# Patient Record
Sex: MALE | Race: WHITE | Employment: OTHER | URBAN - METROPOLITAN AREA
[De-identification: names, ages, dates, MRNs, and addresses within clinical notes are randomized per-mention and may not be internally consistent; named-entity substitution may affect disease eponyms.]

---

## 2017-01-25 ENCOUNTER — NURSE ONLY (OUTPATIENT)
Dept: CARDIOLOGY CLINIC | Age: 77
End: 2017-01-25

## 2017-01-25 DIAGNOSIS — Z95.810 CARDIAC DEFIBRILLATOR IN PLACE: ICD-10-CM

## 2017-01-25 DIAGNOSIS — Z95.810 AUTOMATIC IMPLANTABLE CARDIOVERTER-DEFIBRILLATOR IN SITU: Chronic | ICD-10-CM

## 2017-01-25 DIAGNOSIS — I47.20 VENTRICULAR TACHYCARDIA: Chronic | ICD-10-CM

## 2017-01-25 PROCEDURE — 93295 DEV INTERROG REMOTE 1/2/MLT: CPT | Performed by: INTERNAL MEDICINE

## 2017-01-25 PROCEDURE — 93296 REM INTERROG EVL PM/IDS: CPT | Performed by: INTERNAL MEDICINE

## 2017-05-03 ENCOUNTER — NURSE ONLY (OUTPATIENT)
Dept: CARDIOLOGY CLINIC | Age: 77
End: 2017-05-03

## 2017-05-03 DIAGNOSIS — Z95.810 AUTOMATIC IMPLANTABLE CARDIOVERTER-DEFIBRILLATOR IN SITU: Chronic | ICD-10-CM

## 2017-05-03 DIAGNOSIS — I47.20 VENTRICULAR TACHYCARDIA: Chronic | ICD-10-CM

## 2017-05-03 DIAGNOSIS — Z95.810 CARDIAC DEFIBRILLATOR IN PLACE: ICD-10-CM

## 2017-05-03 PROCEDURE — 93295 DEV INTERROG REMOTE 1/2/MLT: CPT | Performed by: INTERNAL MEDICINE

## 2017-05-03 PROCEDURE — 93296 REM INTERROG EVL PM/IDS: CPT | Performed by: INTERNAL MEDICINE

## 2017-06-02 ENCOUNTER — OFFICE VISIT (OUTPATIENT)
Dept: ORTHOPEDIC SURGERY | Age: 77
End: 2017-06-02

## 2017-06-02 VITALS — BODY MASS INDEX: 23.11 KG/M2 | WEIGHT: 156 LBS | HEIGHT: 69 IN

## 2017-06-02 DIAGNOSIS — M54.50 ACUTE RIGHT-SIDED LOW BACK PAIN WITHOUT SCIATICA: Primary | ICD-10-CM

## 2017-06-02 PROCEDURE — 99203 OFFICE O/P NEW LOW 30 MIN: CPT | Performed by: ORTHOPAEDIC SURGERY

## 2017-06-02 RX ORDER — METHYLPREDNISOLONE 4 MG/1
TABLET ORAL
Qty: 1 KIT | Refills: 0 | Status: SHIPPED | OUTPATIENT
Start: 2017-06-02 | End: 2017-10-31

## 2017-06-12 ENCOUNTER — OFFICE VISIT (OUTPATIENT)
Dept: ORTHOPEDIC SURGERY | Age: 77
End: 2017-06-12

## 2017-06-12 VITALS — HEIGHT: 69 IN | WEIGHT: 156.09 LBS | BODY MASS INDEX: 23.12 KG/M2

## 2017-06-12 DIAGNOSIS — M54.16 LUMBAR RADICULOPATHY: ICD-10-CM

## 2017-06-12 DIAGNOSIS — M54.5 LOW BACK PAIN, UNSPECIFIED BACK PAIN LATERALITY, UNSPECIFIED CHRONICITY, WITH SCIATICA PRESENCE UNSPECIFIED: Primary | ICD-10-CM

## 2017-06-12 PROCEDURE — 99213 OFFICE O/P EST LOW 20 MIN: CPT | Performed by: ORTHOPAEDIC SURGERY

## 2017-06-12 PROCEDURE — 72110 X-RAY EXAM L-2 SPINE 4/>VWS: CPT | Performed by: ORTHOPAEDIC SURGERY

## 2017-06-16 ENCOUNTER — HOSPITAL ENCOUNTER (OUTPATIENT)
Dept: CT IMAGING | Age: 77
Discharge: HOME | End: 2017-06-16
Attending: ORTHOPAEDIC SURGERY | Admitting: ORTHOPAEDIC SURGERY

## 2017-06-16 DIAGNOSIS — M54.16 RADICULOPATHY OF LUMBAR REGION: ICD-10-CM

## 2017-06-16 DIAGNOSIS — M54.16 LUMBAR RADICULOPATHY: ICD-10-CM

## 2017-06-16 DIAGNOSIS — M54.5 LOW BACK PAIN, UNSPECIFIED BACK PAIN LATERALITY, UNSPECIFIED CHRONICITY, WITH SCIATICA PRESENCE UNSPECIFIED: ICD-10-CM

## 2017-06-21 ENCOUNTER — OFFICE VISIT (OUTPATIENT)
Dept: ORTHOPEDIC SURGERY | Age: 77
End: 2017-06-21

## 2017-06-21 VITALS — WEIGHT: 156.09 LBS | HEIGHT: 69 IN | BODY MASS INDEX: 23.12 KG/M2

## 2017-06-21 DIAGNOSIS — M25.551 PAIN OF RIGHT HIP JOINT: Primary | ICD-10-CM

## 2017-06-21 DIAGNOSIS — M51.36 LUMBAR DEGENERATIVE DISC DISEASE: ICD-10-CM

## 2017-06-21 PROCEDURE — 99213 OFFICE O/P EST LOW 20 MIN: CPT | Performed by: ORTHOPAEDIC SURGERY

## 2017-06-22 ENCOUNTER — HOSPITAL ENCOUNTER (OUTPATIENT)
Dept: PHYSICAL THERAPY | Age: 77
Discharge: HOME | End: 2017-06-30
Admitting: ORTHOPAEDIC SURGERY

## 2017-06-27 ENCOUNTER — HOSPITAL ENCOUNTER (OUTPATIENT)
Dept: PHYSICAL THERAPY | Age: 77
Discharge: HOME | End: 2017-06-27
Admitting: ORTHOPAEDIC SURGERY

## 2017-06-29 ENCOUNTER — HOSPITAL ENCOUNTER (OUTPATIENT)
Dept: PHYSICAL THERAPY | Age: 77
Discharge: HOME | End: 2017-06-29
Admitting: ORTHOPAEDIC SURGERY

## 2017-07-03 ENCOUNTER — HOSPITAL ENCOUNTER (OUTPATIENT)
Dept: PHYSICAL THERAPY | Age: 77
Discharge: HOME | End: 2017-07-03
Admitting: ORTHOPAEDIC SURGERY

## 2017-07-06 ENCOUNTER — HOSPITAL ENCOUNTER (OUTPATIENT)
Dept: PHYSICAL THERAPY | Age: 77
Discharge: HOME | End: 2017-07-06
Admitting: ORTHOPAEDIC SURGERY

## 2017-07-10 ENCOUNTER — HOSPITAL ENCOUNTER (OUTPATIENT)
Dept: PHYSICAL THERAPY | Age: 77
Discharge: HOME | End: 2017-07-10
Admitting: ORTHOPAEDIC SURGERY

## 2017-07-12 ENCOUNTER — OFFICE VISIT (OUTPATIENT)
Dept: ORTHOPEDIC SURGERY | Age: 77
End: 2017-07-12

## 2017-07-12 VITALS — HEIGHT: 69 IN | BODY MASS INDEX: 23.12 KG/M2 | WEIGHT: 156.09 LBS

## 2017-07-12 DIAGNOSIS — M51.36 LUMBAR DEGENERATIVE DISC DISEASE: ICD-10-CM

## 2017-07-12 DIAGNOSIS — M54.16 LUMBAR RADICULOPATHY: Primary | ICD-10-CM

## 2017-07-12 PROCEDURE — 99213 OFFICE O/P EST LOW 20 MIN: CPT | Performed by: ORTHOPAEDIC SURGERY

## 2017-07-13 ENCOUNTER — HOSPITAL ENCOUNTER (OUTPATIENT)
Dept: PHYSICAL THERAPY | Age: 77
Discharge: HOME | End: 2017-07-13
Admitting: ORTHOPAEDIC SURGERY

## 2017-07-18 ENCOUNTER — OFFICE VISIT (OUTPATIENT)
Dept: CARDIOLOGY CLINIC | Age: 77
End: 2017-07-18

## 2017-07-18 ENCOUNTER — PROCEDURE VISIT (OUTPATIENT)
Dept: CARDIOLOGY CLINIC | Age: 77
End: 2017-07-18

## 2017-07-18 VITALS
DIASTOLIC BLOOD PRESSURE: 80 MMHG | OXYGEN SATURATION: 96 % | HEART RATE: 64 BPM | WEIGHT: 153.12 LBS | HEIGHT: 70 IN | BODY MASS INDEX: 21.92 KG/M2 | SYSTOLIC BLOOD PRESSURE: 138 MMHG

## 2017-07-18 DIAGNOSIS — Z95.810 AUTOMATIC IMPLANTABLE CARDIOVERTER-DEFIBRILLATOR IN SITU: Chronic | ICD-10-CM

## 2017-07-18 DIAGNOSIS — Z95.810 AUTOMATIC IMPLANTABLE CARDIOVERTER-DEFIBRILLATOR IN SITU: Primary | Chronic | ICD-10-CM

## 2017-07-18 DIAGNOSIS — E78.01 FAMILIAL HYPERCHOLESTEROLEMIA: Chronic | ICD-10-CM

## 2017-07-18 DIAGNOSIS — I25.810 CORONARY ARTERY DISEASE INVOLVING CORONARY BYPASS GRAFT OF NATIVE HEART WITHOUT ANGINA PECTORIS: Chronic | ICD-10-CM

## 2017-07-18 DIAGNOSIS — I47.20 VENTRICULAR TACHYCARDIA: Chronic | ICD-10-CM

## 2017-07-18 DIAGNOSIS — I47.20 VENTRICULAR TACHYCARDIA: Primary | Chronic | ICD-10-CM

## 2017-07-18 DIAGNOSIS — I10 ESSENTIAL HYPERTENSION, BENIGN: Chronic | ICD-10-CM

## 2017-07-18 PROCEDURE — 93283 PRGRMG EVAL IMPLANTABLE DFB: CPT | Performed by: INTERNAL MEDICINE

## 2017-07-18 PROCEDURE — 99214 OFFICE O/P EST MOD 30 MIN: CPT | Performed by: INTERNAL MEDICINE

## 2017-07-20 ENCOUNTER — HOSPITAL ENCOUNTER (OUTPATIENT)
Dept: PHYSICAL THERAPY | Age: 77
Discharge: HOME | End: 2017-07-20
Admitting: ORTHOPAEDIC SURGERY

## 2017-07-27 ENCOUNTER — HOSPITAL ENCOUNTER (OUTPATIENT)
Dept: PHYSICAL THERAPY | Age: 77
Discharge: HOME | End: 2017-07-27
Admitting: ORTHOPAEDIC SURGERY

## 2017-09-11 ENCOUNTER — TELEPHONE (OUTPATIENT)
Dept: CARDIOLOGY CLINIC | Age: 77
End: 2017-09-11

## 2017-09-11 DIAGNOSIS — I10 ESSENTIAL HYPERTENSION, BENIGN: ICD-10-CM

## 2017-09-11 RX ORDER — LOSARTAN POTASSIUM 100 MG/1
100 TABLET ORAL DAILY
Qty: 90 TABLET | Refills: 3 | Status: SHIPPED | OUTPATIENT
Start: 2017-09-11 | End: 2018-08-07 | Stop reason: ALTCHOICE

## 2017-09-11 RX ORDER — SOTALOL HYDROCHLORIDE 80 MG/1
TABLET ORAL
Qty: 180 TABLET | Refills: 3 | Status: SHIPPED | OUTPATIENT
Start: 2017-09-11 | End: 2018-07-16 | Stop reason: SDUPTHER

## 2017-09-11 RX ORDER — SIMVASTATIN 20 MG
TABLET ORAL
Qty: 90 TABLET | Refills: 3 | Status: SHIPPED | OUTPATIENT
Start: 2017-09-11 | End: 2018-08-07 | Stop reason: SDUPTHER

## 2017-10-31 ENCOUNTER — OFFICE VISIT (OUTPATIENT)
Dept: FAMILY MEDICINE CLINIC | Age: 77
End: 2017-10-31

## 2017-10-31 ENCOUNTER — NURSE ONLY (OUTPATIENT)
Dept: CARDIOLOGY CLINIC | Age: 77
End: 2017-10-31

## 2017-10-31 VITALS
RESPIRATION RATE: 16 BRPM | OXYGEN SATURATION: 97 % | TEMPERATURE: 96.5 F | SYSTOLIC BLOOD PRESSURE: 150 MMHG | DIASTOLIC BLOOD PRESSURE: 92 MMHG | BODY MASS INDEX: 22.13 KG/M2 | HEART RATE: 65 BPM | WEIGHT: 154.2 LBS

## 2017-10-31 DIAGNOSIS — I47.20 VENTRICULAR TACHYCARDIA: Chronic | ICD-10-CM

## 2017-10-31 DIAGNOSIS — Z95.810 CARDIAC DEFIBRILLATOR IN PLACE: ICD-10-CM

## 2017-10-31 DIAGNOSIS — Z95.810 AUTOMATIC IMPLANTABLE CARDIOVERTER-DEFIBRILLATOR IN SITU: Chronic | ICD-10-CM

## 2017-10-31 DIAGNOSIS — M48.061 SPINAL STENOSIS OF LUMBAR REGION WITHOUT NEUROGENIC CLAUDICATION: Primary | ICD-10-CM

## 2017-10-31 PROCEDURE — 93296 REM INTERROG EVL PM/IDS: CPT | Performed by: INTERNAL MEDICINE

## 2017-10-31 PROCEDURE — 93295 DEV INTERROG REMOTE 1/2/MLT: CPT | Performed by: INTERNAL MEDICINE

## 2017-10-31 PROCEDURE — 99213 OFFICE O/P EST LOW 20 MIN: CPT | Performed by: FAMILY MEDICINE

## 2017-10-31 RX ORDER — TRAMADOL HYDROCHLORIDE 50 MG/1
50 TABLET ORAL EVERY 6 HOURS PRN
Qty: 20 TABLET | Refills: 1 | Status: SHIPPED | OUTPATIENT
Start: 2017-10-31 | End: 2018-08-07

## 2017-10-31 ASSESSMENT — PATIENT HEALTH QUESTIONNAIRE - PHQ9
1. LITTLE INTEREST OR PLEASURE IN DOING THINGS: 0
SUM OF ALL RESPONSES TO PHQ9 QUESTIONS 1 & 2: 0
2. FEELING DOWN, DEPRESSED OR HOPELESS: 0
SUM OF ALL RESPONSES TO PHQ QUESTIONS 1-9: 0

## 2017-10-31 NOTE — PROGRESS NOTES
Subjective:      Patient ID: Zaira Howell is a 69 y.o. male. BEREKET More is here for evaluation for back pain. He had back pain in June that sent him to the ER. He was referred to Dr. Josette Cannon. CT showed L4-5 stenosis. Pain resolved with PT. A few weeks ago he wrenched the back lifting something over head. Self limiting. 2 weeks ago he was picking up pods in the yard for 30 minutes - the pains is back again and back to as bad as it was in the first time. The pain is dull ache, moderately severe in the right lower back. It occasionally radiates to the back of the right thigh. No weakness in the leg. No fever, weight loss, loss of bladder control. Rx: heat does not help. Tylenol takes the edge off. Exercises from PT right now seem to make it worse    Outpatient Prescriptions Marked as Taking for the 10/31/17 encounter (Office Visit) with Karn Mohs, MD   Medication Sig Dispense Refill    traMADol (ULTRAM) 50 MG tablet Take 1 tablet by mouth every 6 hours as needed for Pain 20 tablet 1    sotalol (BETAPACE) 80 MG tablet TAKE 1 TABLET BY MOUTH TWICE DAILY 180 tablet 3    metoprolol tartrate (LOPRESSOR) 25 MG tablet Take 0.5 tablets by mouth 2 times daily 90 tablet 3    losartan (COZAAR) 100 MG tablet Take 1 tablet by mouth daily 90 tablet 3    simvastatin (ZOCOR) 20 MG tablet TAKE 1 TABLET BY MOUTH EVERY EVENING 90 tablet 3    magnesium oxide (MAG-OX) 400 MG tablet Take 1 tablet by mouth daily. (Patient taking differently:   Take 250 mg by mouth daily ) 90 tablet 2    aspirin 81 MG EC tablet Take 81 mg by mouth daily.         Patient Active Problem List   Diagnosis    Essential hypertension, benign    Hypertrophy of prostate without urinary obstruction and other lower urinary tract symptoms (LUTS)    Occlusion and stenosis of carotid artery    Dizziness    Ventricular tachycardia (HCC)    Automatic implantable cardioverter-defibrillator in situ    CAD (coronary artery reoccurrence and ergonomics addressed. Call or return to clinic prn if these symptoms worsen or fail to improve as anticipated.

## 2017-10-31 NOTE — LETTER
[address] [facility] [phone]  [address] [place] [address]  Keturah Nassar [address] [facility] [phone]    Pacemaker/Defibrillator Clinic          10/31/17        Gunner Hanks  [address] [place] [id number]        Dear Gunner Hanks    This letter is to inform you that we received the transmission from your monitor at home that checks your pacemaker and/or defibrillator, or implanted heart monitor. Everything is within normal limits. The next date your monitor will automatically transmit will be 2-6-18. Please do not send additional routine transmissions unless specifically requested. Your device and monitor are wireless and most transmit cellularly, but please periodically check your monitor is still plugged in to the electrical outlet. If you still use the telephone land line to send please ensure the connection to the phone yaron is secure. This will help to ensure successful automatic transmissions in the future. Also, the monitor needs to be close to you while sleeping at night. Please be aware that the remote device transmission sites are periodically monitored only during regular business hours during which simultaneous in-office device clinics are being run. If your transmission requires attention, we will contact you as soon as possible. Thank you.             Sarthak [address]

## 2017-11-16 ENCOUNTER — HOSPITAL ENCOUNTER (OUTPATIENT)
Dept: PHYSICAL THERAPY | Age: 77
Discharge: HOME | End: 2017-11-30
Admitting: FAMILY MEDICINE

## 2017-11-16 ASSESSMENT — PAIN DESCRIPTION - ORIENTATION: ORIENTATION: RIGHT

## 2017-11-16 ASSESSMENT — PAIN DESCRIPTION - ONSET: ONSET: AWAKENED FROM SLEEP

## 2017-11-16 ASSESSMENT — PAIN DESCRIPTION - LOCATION: LOCATION: BACK;HIP

## 2017-11-16 ASSESSMENT — PAIN DESCRIPTION - PROGRESSION: CLINICAL_PROGRESSION: GRADUALLY IMPROVING

## 2017-11-16 ASSESSMENT — PAIN DESCRIPTION - DESCRIPTORS: DESCRIPTORS: ACHING

## 2017-11-16 ASSESSMENT — PAIN DESCRIPTION - PAIN TYPE: TYPE: ACUTE PAIN

## 2017-11-16 ASSESSMENT — PAIN SCALES - GENERAL: PAINLEVEL_OUTOF10: 3

## 2017-11-16 ASSESSMENT — PAIN DESCRIPTION - FREQUENCY: FREQUENCY: INTERMITTENT

## 2017-11-16 NOTE — PROGRESS NOTES
Physical Therapy       Oswestry Disability Index 2.0    Patient: Yareli Guerrero  : 1940  MRN: [id number]  Date: 2017  Electronically Signed by: Arabella Villalobos     Please Read: Could you please complete this questionnaire. It is designed to give us information as to how your back (or leg) trouble has affected your ability to manage in everyday life. Please answer every section. Lars one box only in each section that most closely describes you today  SECTION 1 - Pain Intensity  []A. I have no pain at the moment  []B. The pain is very mild at the moment  [x]C. The pain is moderate at the moment  []D. The pain is fairly severe at the moment  []E. The pain is very severe at the moment  []F. The pain is the worst imaginable at the moment SECTION 6 - Standing   []A. I can stand as long as I want without extra pain  [x]B. I can stand as long as I want but it gives me extra pain  []C. Pain prevents me from standing for more than 1 hour  []D. Pain prevents me from standing for more than 1/2 hour  []E. Pain prevents me from standing more than 10 minutes  []F. Pain prevents me from standing at all   SECTION 2 - Personal Care Mary Tolbert, etc.)  [x]A. I can look after myself normally without causing extra pain  []B. I can look after myself normally, but it causes me extra pain  []C. It is painful to look after myself and I am slow and careful  []D. I need some help, but manage most of my personal care  []E. I need help every day in most aspects of personal care  []F. I do not get dressed, I wash with difficulty and stay in bed SECTION 7 - Sleeping  []A. My sleep is never disturbed by pain   [x]B. My sleep is occasionally disturbed by pain  []C. Because of pain I have less than 6 hours sleep   []D. Because of pain I have less than 4 hours sleep   []E. Because of pain I have less than 2 hours sleep   []F. Pain prevents me from sleeping at all     SECTION 3 - Lifting  []A.  I can lift heavy weights managed by conservative means. 40-60% Severe   Disability Pain remains the main problem in this group of patients by travel, personal care, social life, sexual activity, and sleep are also affected. These patients require detailed investigation. 60-80% Crippled   Back pain impinges on all aspects of these patients' lives both at home and at work. Positive intervention is required. % Bed-bound or exaggerating These patients are either bed-bound or exaggerating their symptoms. This can be evaluated by careful observation of the patient during the medical examination. Makeda Shrestha. And GEORGI Rodriguez (2000). \"The Vladimir-Miguelito Disability Questionnaire and the Oswestry Disability Questionnaire. \" Spine 2524): 7930-73              G-Code Cross[place]:  Oswestry Total Score Disability Index CMS Modifier   0 0% []CH   1-9 1-19% []CI   10-19 20-39% [x]CJ   20-29 40-59% []CK   30-39 60-79% []CL   40-49 80-99% []CM   50 100% []CN

## 2017-11-16 NOTE — PROGRESS NOTES
Physical Therapy      Physical Therapy Daily Treatment Note  Date:  2017    Patient Name:  Sukumar Gao    :  1940  MRN: [id number]  Restrictions/Precautions:  None  Medical/Treatment Diagnosis Information:   · Diagnosis: spinal stenosis of lumbar region  · Treatment Diagnosis: limited lumbar ROM, decreased postural awareness, pain with ADL's that extends into leg at times. Tracking Information:  Physician Information Referring Practitioner: Pratik Conway MD     Plan of Care Sent Date:  Signed Received:    Visit Count / Total Visits      Insurance Approved Visits   Approved Dates:     Insurance Information PT Insurance Information: [employer] - orthonet approval required  *If only certain CPT codes approved use smart phrase CPT calculator . OPPTINSURANCECPTCODECALCULATOR   Progress Note/G-codes   [x]  Yes  []  No Next Due: last visit     Pain level: 2-3/10     Subjective:  See eval for full subjective report    Objective: See evaluation  Observation:   Test measurements:      Exercises:  Exercise/Equipment Resistance/Repetitions Other comments                                                                            Other Therapeutic Activities:  Evaluation completed this date. Discussed risks and benefits of tx with pt - pt agreed with POC. Reviewed mechanics of spine using model and discussed proper posture and body mechanics. Educated pt on centralization principle. Pt instructed on proper sitting posture with towel roll. Written HEP provided for pt. Home Exercise Program:  Provided written copy of HEP with pictures for pt of prone, EBONY, press ups, standing extension. Encouraged sitting with lumbar towel roll and all activities should follow centralization principle.       Manual Treatments:  None this date    Modalities:  None this date    Timed Code Treatment Minutes:  45    Total Treatment Minutes:  60    Treatment/Activity Tolerance:  [x] Patient tolerated treatment well [] Patient limited by fatigue  [] Patient limited by pain  [] Patient limited by other medical complications  [] Other:     Prognosis: [x] Good [] Fair  [] Poor    Patient Requires Follow-up: [x] Yes  [] No    Plan:   [] Continue per plan of care [] Alter current plan (see comments)  [x] Plan of care initiated [] Hold pending MD visit [] Discharge  Plan for Next Session:  Continue with prone progression, standing extension and education on posture correction and proper body mechanics, education on proper lifting. Begin core strengthening when sx are centralized.     Electronically signed by:  Nadine Valdez PT

## 2017-11-16 NOTE — PROGRESS NOTES
Physical Therapy      Outpatient Physical Therapy     Phone: [phone] Fax: [phone]     To: Referring Practitioner: Konrad Don MD      Patient: Shira Lo   : 1940   MRN: [id number]  Evaluation Date: 2017      Diagnosis Information:  · Diagnosis: spinal stenosis of lumbar region   · Treatment Diagnosis: limited lumbar ROM, decreased postural awareness, pain with ADL's that extends into leg at times. Physical Therapy Certification/Re-Certification Form  Dear Dr. Petty Fraser  The following patient has been evaluated for physical therapy services. Please review the attached evaluation and/or summary of the patient's plan of care, and verify that you agree therapy should continue by signing the attached document and sending it back to our office. Plan of Care/Treatment to date:  [x] Therapeutic Exercise      [x] Modalities:  [x] Therapeutic Activity        [] Ultrasound    [] Gait Training        [] Cervical Traction   [] Neuromuscular Re-education      [x] Cold/hotpack    [x] Instruction in HEP        [] Lumbar Traction  [x] Manual Therapy        [] Electrical Stimulation            [] Aquatic Therapy        [] Iontophoresis        ? [] Lymphedema management  [] Women's Health     Other:  [] Vestibular Rehab        []    []  Needed     Frequency/Duration:  # Days per week: [x] 1 day # Weeks: [] 1 week [] 5 weeks     [] 2 days? [] 2 weeks [] 6 weeks     [] 3 days   [] 3 weeks [] 7 weeks     [] 4 days   [x] 4 weeks [] 8 weeks    Rehab Potential: [x] Excellent [] Good [] Fair  [] Poor     Electronically signed by:  Aliyah Dong PT, DPT  [id number]           If you have any questions or concerns, please don't hesitate to call.   Thank you for your referral.      Physician Signature:________________________________Date:__________________  By signing above, therapists plan is approved by physician

## 2017-12-01 ENCOUNTER — HOSPITAL ENCOUNTER (OUTPATIENT)
Dept: OTHER | Age: 77
Discharge: HOME | End: 2017-12-31
Attending: FAMILY MEDICINE | Admitting: FAMILY MEDICINE

## 2018-01-01 ENCOUNTER — HOSPITAL ENCOUNTER (OUTPATIENT)
Dept: OTHER | Age: 78
Discharge: HOME | End: 2018-01-31
Attending: FAMILY MEDICINE | Admitting: FAMILY MEDICINE

## 2018-02-06 ENCOUNTER — NURSE ONLY (OUTPATIENT)
Dept: CARDIOLOGY CLINIC | Age: 78
End: 2018-02-06

## 2018-02-06 DIAGNOSIS — Z95.810 CARDIAC DEFIBRILLATOR IN PLACE: ICD-10-CM

## 2018-02-06 DIAGNOSIS — I47.20 VENTRICULAR TACHYCARDIA: Chronic | ICD-10-CM

## 2018-02-06 PROCEDURE — 93295 DEV INTERROG REMOTE 1/2/MLT: CPT | Performed by: INTERNAL MEDICINE

## 2018-02-06 PROCEDURE — 93296 REM INTERROG EVL PM/IDS: CPT | Performed by: INTERNAL MEDICINE

## 2018-02-06 NOTE — LETTER
[address] [facility] [phone] 17[address] [facility]  Carlos Hardy 28[address] [facility]    Pacemaker/Defibrillator Clinic          02/08/18        Dora Morris  [address] [place] [address]        Dear Dora Morris    This letter is to inform you that we received the transmission from your monitor at home that checks your pacemaker and/or defibrillator, or implanted heart monitor. The next date your monitor will automatically transmit will be 5-15-18. Please do not send additional routine transmissions unless specifically requested. Your device and monitor are wireless and most transmit cellularly, but please periodically check your monitor is still plugged in to the electrical outlet. If you still use the telephone land line to send please ensure the connection to the phone yaron is secure. This will help to ensure successful automatic transmissions in the future. Also, the monitor needs to be close to you while sleeping at night. Please be aware that the remote device transmission sites are periodically monitored only during regular business hours during which simultaneous in-office device clinics are being run. If your transmission requires attention, we will contact you as soon as possible. Thank you.             [facility]

## 2018-05-15 ENCOUNTER — NURSE ONLY (OUTPATIENT)
Dept: CARDIOLOGY CLINIC | Age: 78
End: 2018-05-15

## 2018-05-15 DIAGNOSIS — I47.20 VENTRICULAR TACHYCARDIA: Chronic | ICD-10-CM

## 2018-05-15 DIAGNOSIS — Z95.810 CARDIAC DEFIBRILLATOR IN PLACE: ICD-10-CM

## 2018-05-15 PROCEDURE — 93296 REM INTERROG EVL PM/IDS: CPT | Performed by: INTERNAL MEDICINE

## 2018-05-15 PROCEDURE — 93295 DEV INTERROG REMOTE 1/2/MLT: CPT | Performed by: INTERNAL MEDICINE

## 2018-07-16 ENCOUNTER — PROCEDURE VISIT (OUTPATIENT)
Dept: CARDIOLOGY CLINIC | Age: 78
End: 2018-07-16

## 2018-07-16 ENCOUNTER — OFFICE VISIT (OUTPATIENT)
Dept: CARDIOLOGY CLINIC | Age: 78
End: 2018-07-16

## 2018-07-16 VITALS
DIASTOLIC BLOOD PRESSURE: 85 MMHG | WEIGHT: 156 LBS | HEIGHT: 70 IN | SYSTOLIC BLOOD PRESSURE: 138 MMHG | BODY MASS INDEX: 22.33 KG/M2 | HEART RATE: 67 BPM

## 2018-07-16 DIAGNOSIS — Z95.810 AUTOMATIC IMPLANTABLE CARDIOVERTER-DEFIBRILLATOR IN SITU: Chronic | ICD-10-CM

## 2018-07-16 DIAGNOSIS — I10 ESSENTIAL HYPERTENSION, BENIGN: Chronic | ICD-10-CM

## 2018-07-16 DIAGNOSIS — E78.2 MIXED HYPERLIPIDEMIA: Chronic | ICD-10-CM

## 2018-07-16 DIAGNOSIS — I47.20 VENTRICULAR TACHYCARDIA: Chronic | ICD-10-CM

## 2018-07-16 DIAGNOSIS — I25.810 CORONARY ARTERY DISEASE INVOLVING CORONARY BYPASS GRAFT OF NATIVE HEART WITHOUT ANGINA PECTORIS: Chronic | ICD-10-CM

## 2018-07-16 DIAGNOSIS — I47.20 VENTRICULAR TACHYCARDIA: Primary | Chronic | ICD-10-CM

## 2018-07-16 PROCEDURE — 99214 OFFICE O/P EST MOD 30 MIN: CPT | Performed by: INTERNAL MEDICINE

## 2018-07-16 PROCEDURE — 93283 PRGRMG EVAL IMPLANTABLE DFB: CPT | Performed by: INTERNAL MEDICINE

## 2018-07-16 RX ORDER — SOTALOL HYDROCHLORIDE 80 MG/1
TABLET ORAL
Qty: 180 TABLET | Refills: 3 | Status: SHIPPED | OUTPATIENT
Start: 2018-07-16 | End: 2019-02-27 | Stop reason: SDUPTHER

## 2018-07-16 NOTE — PROGRESS NOTES
17 10/08/2015    LABVLDL 14 06/26/2014     Diagnostic testing:  Pertinent reports were reviewed as a part of this visit. Last Echo: 9/29/12 This is a technically adequate echocardiography study. Left ventricular size is normal. There was moderate left ventricular hypertrophy. Left ventricular systolic function appears  to be normal. Ejection fraction is estimated at 60%. There was evidence of Grade 1 diastolic dysfunction. Right ventricular size appears to be normal. Right ventricular  systolic function appears to be normal. Left atrial size is normal. The right atrial size appears to be normal.  Aortic root size measurements are with normal range. The aortic valve is trileaflet. Leaflets were mildly thickened. There was no evidence of aortic stenosis. No significant aortic  regurgitation was noted. Mitral valve leaflets were mildly thickened. There was mild mitral regurgitation. The tricuspid valve is structurally normal. Trivial tricuspid   regurgitation is noted. Right ventricular systolic pressure cannot be estimated based on this study. The pulmonic valve was poorly seen. No significant pericardial effusion was noted. Last Stress Test / Angiogram:  [facility] 9/28/12 EF 60%, WM normal,   LM nl, LAD nl, LAD nl, Diag2 prox 20%, circ nl, OM1 ostial 30%, RCA LI    ECG today:  SR 65bpm QTC 412msec    Vitals:    07/16/18 1506   BP: 138/85   Pulse: 67     Review of System:  All other systems reviewed and are negative except for that noted above.  Pertinent negatives are:     · General: negative for fever, chills   · Ophthalmic ROS: negative for - eye pain or loss of vision  · ENT ROS: negative for - headaches, sore throat   · Respiratory: negative for - cough, sputum  · Cardiovascular: Reviewed in HPI  · Gastrointestinal: negative for - abdominal pain, diarrhea, N/V  · Hematology: negative for - bleeding, blood clots, bruising or jaundice  · Genito-Urinary:  negative for - Dysuria or incontinence  · Musculoskeletal: negative for - Joint swelling, muscle pain  · Neurological: negative for - confusion, dizziness, headaches   · Psychiatric: No anxiety, no depression. · Dermatological: negative for - rash    Physical Examination:    Vitals:    07/16/18 1506   BP: 138/85   Pulse: 67       Wt Readings from Last 3 Encounters:   07/16/18 156 lb (70.8 kg)   10/31/17 154 lb 3.2 oz (69.9 kg)   07/18/17 153 lb 1.9 oz (69.5 kg)     · Constitutional: Oriented. No distress. · Head: Normocephalic and atraumatic. · Mouth/Throat: Oropharynx is clear and moist.   · Eyes: Conjunctivae normal. EOM are normal.   · Neck: Neck supple. No rigidity. No JVD present. · Cardiovascular: Normal rate, regular rhythm, S1&S2. · Pulmonary/Chest: Bilateral respiratory sounds. No wheezes, No rhonchi. · Abdominal: Soft. Bowel sounds present. No distension, No tenderness. · Musculoskeletal: No tenderness. No edema    · Lymphadenopathy: Has no cervical adenopathy. · Neurological: Alert and oriented. Cranial nerve appears intact, No Gross deficit   · Skin: Skin is warm and dry. No rash noted. · Psychiatric: Has a normal behavior     Assessment:   1. Ventricular tachycardia   No further episodes since therapy with sotalol. Discussed discontinuation of sotalol on last visit however he wanted to continue with sotalol since he was afraid of arrhythmia recurrence. Sotalol 80 mg bid  QTC normal.         2. Dual chamber defibrillator in place - normal function    Device interrogation functions normally. No ventricular tachycardia  . 3. Mild non-obstructive CAD (coronary artery disease) - 9/28/12 LHC: EF 60%, WM normal, LM nl, LAD nl, LAD nl, Diag2 prox 20%, circ nl, OM1 ostial 30%, RCA LI. Stable. ASA 81 mg daily  Simvastatin 20 mg daily  Metoprolol 12.5 mg twice a day  Losartan 100 mg daily     4. Hyperlipidemia -  Simvastatin. Stable. 5.  HTN:  Blood pressure is borderline high however patient states that he has white coat syndrome. Recommend ambulatory blood pressure check and if his elevated she will discuss it with his primary care. 6. Cataract Surgery   He may proceed with planned cataract surgery from a cardiac standpoint.    - The patient is counseled to follow a low salt diet to assure blood pressure remains controlled for cardiovascular risk factor modification.   - The patient is counseled to avoid excess caffeine, and energy drinks as this may exacerbated ectopy and arrhythmia. - The patient is counseled to get regular exercise 3-5 times per week to control cardiovascular risk factors. Follow up in 1 year. Scribe's attestation: This note was scribed in the presence of Gayle Carter M.D. by Dahlia Mendez RN.      Physician Attestation: I, Dr. Gayle Carter, confirm that the scribe's documentation has been prepared under my direction and personally reviewed by me in its entirety. I also confirm that the note above accurately reflects all work, treatment, procedures, and medical decision making performed by me. NOTE: This report was transcribed using voice recognition software. Every effort was made to ensure accuracy, however, inadvertent computerized transcription errors may be present.       Gayle Carter MD, MPH  [facility]   Office: [phone]

## 2018-07-16 NOTE — PATIENT INSTRUCTIONS
Patient Education        Heart-Healthy Diet: Care Instructions  Your Care Instructions    A heart-healthy diet has lots of vegetables, fruits, nuts, beans, and whole grains, and is low in salt. It limits foods that are high in saturated fat, such as meats, cheeses, and fried foods. It may be hard to change your diet, but even small changes can lower your risk of heart attack and heart disease. Follow-up care is a key part of your treatment and safety. Be sure to make and go to all appointments, and call your doctor if you are having problems. It's also a good idea to know your test results and keep a list of the medicines you take. How can you care for yourself at home? Watch your portions  · Learn what a serving is. A \"serving\" and a \"portion\" are not always the same thing. Make sure that you are not eating larger portions than are recommended. For example, a serving of pasta is ½ cup. A serving size of meat is 2 to 3 ounces. A 3-ounce serving is about the size of a deck of cards. Measure serving sizes until you are good at [place]" them. Keep in mind that restaurants often serve portions that are 2 or 3 times the size of one serving. · To keep your energy level up and keep you from feeling hungry, eat often but in smaller portions. · Eat only the number of calories you need to stay at a healthy weight. If you need to lose weight, eat fewer calories than your body burns (through exercise and other physical activity). Eat more fruits and vegetables  · Eat a variety of fruit and vegetables every day. Dark green, deep orange, red, or yellow fruits and vegetables are especially good for you. Examples include spinach, carrots, peaches, and berries. · Keep carrots, celery, and other veggies handy for snacks. Buy fruit that is in season and store it where you can see it so that you will be tempted to eat it. · Cook dishes that have a lot of veggies in them, such as stir-fries and soups.   Limit saturated and [employer], and be sure to contact your doctor if:    · You would like help planning heart-healthy meals. Where can you learn more? Go to [email or web address] and sign in to your [employer] account. Enter V137 in the [employer] box to learn more about \"Heart-Healthy Diet: Care Instructions. \"     If you do not have an account, please click on the \"Sign Up Now\" link. Current as of: December 6, 2017  Content Version: 11.6  © 6067-2857 [employer]. Care instructions adapted under license by [facility] ([facility]). If you have questions about a medical condition or this instruction, always ask your healthcare professional. [address] any warranty or liability for your use of this information.

## 2018-08-06 PROBLEM — H25.013 CORTICAL AGE-RELATED CATARACT OF BOTH EYES: Status: ACTIVE | Noted: 2018-08-06

## 2018-08-06 PROBLEM — Z01.818 PREOP EXAMINATION: Status: ACTIVE | Noted: 2018-08-06

## 2018-08-07 ENCOUNTER — OFFICE VISIT (OUTPATIENT)
Dept: FAMILY MEDICINE CLINIC | Age: 78
End: 2018-08-07

## 2018-08-07 VITALS
HEART RATE: 64 BPM | BODY MASS INDEX: 23.98 KG/M2 | WEIGHT: 152.8 LBS | OXYGEN SATURATION: 95 % | RESPIRATION RATE: 16 BRPM | TEMPERATURE: 96.9 F | HEIGHT: 67 IN | DIASTOLIC BLOOD PRESSURE: 90 MMHG | SYSTOLIC BLOOD PRESSURE: 173 MMHG

## 2018-08-07 DIAGNOSIS — Z01.818 PREOP EXAMINATION: Primary | ICD-10-CM

## 2018-08-07 DIAGNOSIS — Z23 NEED FOR SHINGLES VACCINE: ICD-10-CM

## 2018-08-07 DIAGNOSIS — I10 ESSENTIAL HYPERTENSION, BENIGN: Chronic | ICD-10-CM

## 2018-08-07 DIAGNOSIS — E78.2 MIXED HYPERLIPIDEMIA: Chronic | ICD-10-CM

## 2018-08-07 DIAGNOSIS — I25.10 CORONARY ARTERY DISEASE INVOLVING NATIVE CORONARY ARTERY OF NATIVE HEART WITHOUT ANGINA PECTORIS: ICD-10-CM

## 2018-08-07 DIAGNOSIS — H25.011 CORTICAL AGE-RELATED CATARACT OF RIGHT EYE: ICD-10-CM

## 2018-08-07 DIAGNOSIS — Z23 NEED FOR TDAP VACCINATION: ICD-10-CM

## 2018-08-07 PROCEDURE — 99215 OFFICE O/P EST HI 40 MIN: CPT | Performed by: FAMILY MEDICINE

## 2018-08-07 RX ORDER — LOSARTAN POTASSIUM AND HYDROCHLOROTHIAZIDE 25; 100 MG/1; MG/1
1 TABLET ORAL DAILY
Qty: 90 TABLET | Refills: 1 | Status: SHIPPED | OUTPATIENT
Start: 2018-08-07 | End: 2018-09-27 | Stop reason: SDUPTHER

## 2018-08-07 RX ORDER — LOSARTAN POTASSIUM AND HYDROCHLOROTHIAZIDE 25; 100 MG/1; MG/1
1 TABLET ORAL DAILY
Qty: 90 TABLET | Refills: 3 | Status: SHIPPED | OUTPATIENT
Start: 2018-08-07 | End: 2018-08-07 | Stop reason: SDUPTHER

## 2018-08-07 RX ORDER — LOSARTAN POTASSIUM 100 MG/1
100 TABLET ORAL DAILY
Qty: 90 TABLET | Refills: 3 | Status: CANCELLED | OUTPATIENT
Start: 2018-08-07

## 2018-08-07 RX ORDER — SIMVASTATIN 20 MG
TABLET ORAL
Qty: 90 TABLET | Refills: 3 | Status: SHIPPED | OUTPATIENT
Start: 2018-08-07 | End: 2019-06-27 | Stop reason: SDUPTHER

## 2018-08-07 NOTE — PROGRESS NOTES
noted in the HPI. Physical Exam   Constitutional: He is oriented to person, place, and time. He appears well-developed and well-nourished. No distress. HENT:   Head: Normocephalic and atraumatic. Mouth/Throat: Uvula is midline, oropharynx is clear and moist and mucous membranes are normal.   Eyes: Conjunctivae and EOM are normal. Pupils are equal, round, and reactive to light. Neck: Trachea normal and normal range of motion. Neck supple. No JVD present. Carotid bruit is not present. No mass and no thyromegaly present. Cardiovascular: Normal rate, regular rhythm, normal heart sounds and intact distal pulses. Exam reveals no gallop and no friction rub. No murmur heard. Pulmonary/Chest: Effort normal and breath sounds normal. No respiratory distress. He has no wheezes. He has no rales. Abdominal: Soft. Normal aorta and bowel sounds are normal. He exhibits no distension and no mass. There is no hepatosplenomegaly. No tenderness. Musculoskeletal: He exhibits no edema and no tenderness. Neurological: He is alert and oriented to person, place, and time. He has normal strength. No cranial nerve deficit or sensory deficit. Coordination and gait normal.   Skin: Skin is warm and dry. No rash noted. No erythema. Psychiatric: He has a normal mood and affect. His behavior is normal.     EKG normal  7/16/18    Lab Review not applicable        Assessment:       69 y.o. patient with planned surgery as above. Known risk factors for perioperative complications: Coronary artery disease, Hypertension  Current medications which may produce withdrawal symptoms if withheld perioperatively: none      Plan:     1. Preoperative workup as follows: none  2. Change in medication regimen before surgery: Take Losartan, Sotalol, Metoprolol  on morning of surgery with sip of water, and hold all other medications until after surgery  3. Prophylaxis for cardiac events with perioperative beta-blockers: taking.    ACC/AHA

## 2018-08-14 ENCOUNTER — TELEPHONE (OUTPATIENT)
Dept: CARDIOLOGY CLINIC | Age: 78
End: 2018-08-14

## 2018-09-05 PROBLEM — Z01.818 PREOP EXAMINATION: Status: RESOLVED | Noted: 2018-08-06 | Resolved: 2018-09-05

## 2018-09-27 ENCOUNTER — OFFICE VISIT (OUTPATIENT)
Dept: FAMILY MEDICINE CLINIC | Age: 78
End: 2018-09-27
Payer: MEDICARE

## 2018-09-27 VITALS
HEIGHT: 66 IN | SYSTOLIC BLOOD PRESSURE: 153 MMHG | DIASTOLIC BLOOD PRESSURE: 92 MMHG | HEART RATE: 60 BPM | OXYGEN SATURATION: 96 % | WEIGHT: 154.2 LBS | BODY MASS INDEX: 24.78 KG/M2 | RESPIRATION RATE: 12 BRPM | TEMPERATURE: 96.5 F

## 2018-09-27 DIAGNOSIS — H25.9 SENILE CATARACT OF LEFT EYE, UNSPECIFIED AGE-RELATED CATARACT TYPE: ICD-10-CM

## 2018-09-27 DIAGNOSIS — Z01.818 PREOP EXAMINATION: Primary | ICD-10-CM

## 2018-09-27 DIAGNOSIS — I25.10 CORONARY ARTERY DISEASE INVOLVING NATIVE CORONARY ARTERY OF NATIVE HEART WITHOUT ANGINA PECTORIS: ICD-10-CM

## 2018-09-27 DIAGNOSIS — Z95.810 AUTOMATIC IMPLANTABLE CARDIOVERTER-DEFIBRILLATOR IN SITU: Chronic | ICD-10-CM

## 2018-09-27 DIAGNOSIS — I10 ESSENTIAL HYPERTENSION, BENIGN: Chronic | ICD-10-CM

## 2018-09-27 PROCEDURE — 99215 OFFICE O/P EST HI 40 MIN: CPT | Performed by: FAMILY MEDICINE

## 2018-09-27 RX ORDER — LOSARTAN POTASSIUM AND HYDROCHLOROTHIAZIDE 25; 100 MG/1; MG/1
0.5 TABLET ORAL 2 TIMES DAILY
Qty: 90 TABLET | Refills: 1
Start: 2018-09-27 | End: 2018-10-12 | Stop reason: ALTCHOICE

## 2018-09-27 ASSESSMENT — PATIENT HEALTH QUESTIONNAIRE - PHQ9
SUM OF ALL RESPONSES TO PHQ9 QUESTIONS 1 & 2: 0
SUM OF ALL RESPONSES TO PHQ QUESTIONS 1-9: 0
2. FEELING DOWN, DEPRESSED OR HOPELESS: 0
1. LITTLE INTEREST OR PLEASURE IN DOING THINGS: 0
SUM OF ALL RESPONSES TO PHQ QUESTIONS 1-9: 0

## 2018-09-27 NOTE — PROGRESS NOTES
Preoperative Consultation      Eddye Mealing  YOB: 1940    Date of Service:  9/27/2018    There were no vitals filed for this visit. Wt Readings from Last 2 Encounters:   09/27/18 154 lb 3.2 oz (69.9 kg)   08/07/18 152 lb 12.8 oz (69.3 kg)     BP Readings from Last 3 Encounters:   09/27/18 (!) 153/92   08/07/18 (!) 173/90   07/16/18 138/85        Chief Complaint   Patient presents with   Nilay Hidalgo Pre-op Exam      preop left eye cataract Dr. Manda Neely 10-2-18 [facility] surgery center     No Known Allergies  Outpatient Prescriptions Marked as Taking for the 9/27/18 encounter (Office Visit) with Brad Orona MD   Medication Sig Dispense Refill    simvastatin (ZOCOR) 20 MG tablet TAKE 1 TABLET BY MOUTH EVERY EVENING 90 tablet 3    losartan-hydrochlorothiazide (HYZAAR) 100-25 MG per tablet Take 1 tablet by mouth daily 90 tablet 1    sotalol (BETAPACE) 80 MG tablet TAKE 1 TABLET BY MOUTH TWICE DAILY 180 tablet 3    metoprolol tartrate (LOPRESSOR) 25 MG tablet Take 0.5 tablets by mouth 2 times daily 90 tablet 3    magnesium oxide (MAG-OX) 400 MG tablet Take 1 tablet by mouth daily. (Patient taking differently:   Take 250 mg by mouth daily ) 90 tablet 2    aspirin 81 MG EC tablet Take 81 mg by mouth daily. This patient presents to the office today for a preoperative consultation at the request of surgeon, Dr. Jared Doll, who plans on performing PHACO/IOL OS on October 2 at [place] [address] .       Planned anesthesia: Topical anesthesia   Known anesthesia problems: None   Bleeding risk: No recent or remote history of abnormal bleeding  Personal or FH of DVT/PE: No    Patient objection to receiving blood products: No    Patient Active Problem List   Diagnosis    Essential hypertension, benign    Hypertrophy of prostate without urinary obstruction and other lower urinary tract symptoms (LUTS)    Occlusion and stenosis of carotid artery    Automatic implantable cardioverter-defibrillator in in the am 160-170/80s. In the afternoon 110-137/70s. Takes Losartan in the AM.    Physical Exam   Constitutional: He is oriented to person, place, and time. He appears well-developed and well-nourished. No distress. HENT:   Head: Normocephalic and atraumatic. Mouth/Throat: Uvula is midline, oropharynx is clear and moist and mucous membranes are normal.   Eyes: Conjunctivae and EOM are normal. Pupils are equal, round, and reactive to light. Neck: Trachea normal and normal range of motion. Neck supple. No JVD present. Carotid bruit is not present. No mass and no thyromegaly present. Cardiovascular: Normal rate, regular rhythm, normal heart sounds and intact distal pulses. Exam reveals no gallop and no friction rub. No murmur heard. Pulmonary/Chest: Effort normal and breath sounds normal. No respiratory distress. He has no wheezes. He has no rales. Abdominal: Soft. Normal aorta and bowel sounds are normal. He exhibits no distension and no mass. There is no hepatosplenomegaly. No tenderness. Musculoskeletal: He exhibits no edema and no tenderness. Neurological: He is alert and oriented to person, place, and time. He has normal strength. No cranial nerve deficit. Coordination and gait normal.   Skin: Skin is warm and dry. No rash noted. No erythema. Psychiatric: He has a normal mood and affect. His behavior is normal.         Lab Review   Lab Results   Component Value Date     08/08/2018    K 4.7 08/08/2018     08/08/2018    CO2 24 08/08/2018    BUN 19 08/08/2018    CREATININE 0.77 08/08/2018    GLUCOSE 99 08/08/2018    GLUCOSE 109 03/17/2012    CALCIUM 9.6 08/08/2018             Assessment:       69 y.o. patient with planned surgery as above. Known risk factors for perioperative complications: Coronary artery disease, Hypertension  Current medications which may produce withdrawal symptoms if withheld perioperatively: none      Plan:     1. Preoperative workup as follows: none  2. Change in medication regimen before surgery: Take Losartan and Metoprolol on morning of surgery with sip of water, and hold all other medications until after surgery  3. Prophylaxis for cardiac events with perioperative beta-blockers: Currently taking  metoprolol  ACC/AHA indications for pre-operative beta-blocker use:    · Vascular surgery with history of postitive stress test  · Intermediate or high risk surgery with history of CAD   · Intermediate or high risk surgery with multiple clinical predictors of CAD- 2 of the following: history of compensated or prior heart failure, history of cerebrovascular disease, DM, or renal insufficiency    Routine administration of higher-dose, long-acting metoprolol in beta-blocker-naïve patients on the day of surgery, and in the absence of dose titration is associated with an overall increase in mortality. Beta-blockers should be started days to weeks prior to surgery and titrated to pulse < 70.  4. Deep vein thrombosis prophylaxis: not indicated  5. No contraindications to planned surgery           Diagnosis Orders   1. Preop examination     2. Senile cataract of left eye, unspecified age-related cataract type     3. Essential hypertension, benign  losartan-hydrochlorothiazide (HYZAAR) 100-25 MG per tablet - change dosing to 1/2 tab QOD  He will monitor BP and let me know results in a week or so. If not controlled will consider changing to Irbesartan HCTZ. 4. Coronary artery disease involving native coronary artery of native heart without angina pectoris  Stable; continue BP and lipid control   5. Automatic implantable cardioverter-defibrillator in situ  No symptomatic firing.

## 2018-10-12 ENCOUNTER — OFFICE VISIT (OUTPATIENT)
Dept: FAMILY MEDICINE CLINIC | Age: 78
End: 2018-10-12
Payer: MEDICARE

## 2018-10-12 VITALS
WEIGHT: 154.8 LBS | HEART RATE: 63 BPM | OXYGEN SATURATION: 94 % | TEMPERATURE: 96.7 F | RESPIRATION RATE: 10 BRPM | DIASTOLIC BLOOD PRESSURE: 84 MMHG | BODY MASS INDEX: 24.99 KG/M2 | SYSTOLIC BLOOD PRESSURE: 186 MMHG

## 2018-10-12 DIAGNOSIS — I10 ESSENTIAL HYPERTENSION, BENIGN: Primary | Chronic | ICD-10-CM

## 2018-10-12 DIAGNOSIS — I10 ESSENTIAL HYPERTENSION, BENIGN: Chronic | ICD-10-CM

## 2018-10-12 LAB
ANION GAP SERPL CALCULATED.3IONS-SCNC: 13 MMOL/L (ref 3–16)
BUN BLDV-MCNC: 12 MG/DL (ref 7–20)
CALCIUM SERPL-MCNC: 9.7 MG/DL (ref 8.3–10.6)
CHLORIDE BLD-SCNC: 97 MMOL/L (ref 99–110)
CO2: 30 MMOL/L (ref 21–32)
CREAT SERPL-MCNC: 0.7 MG/DL (ref 0.8–1.3)
GFR AFRICAN AMERICAN: >60
GFR NON-AFRICAN AMERICAN: >60
GLUCOSE BLD-MCNC: 87 MG/DL (ref 70–99)
POTASSIUM SERPL-SCNC: 4.9 MMOL/L (ref 3.5–5.1)
SODIUM BLD-SCNC: 140 MMOL/L (ref 136–145)

## 2018-10-12 PROCEDURE — 99214 OFFICE O/P EST MOD 30 MIN: CPT | Performed by: FAMILY MEDICINE

## 2018-10-12 RX ORDER — IRBESARTAN AND HYDROCHLOROTHIAZIDE 300; 12.5 MG/1; MG/1
1 TABLET, FILM COATED ORAL DAILY
Qty: 30 TABLET | Refills: 5 | Status: SHIPPED | OUTPATIENT
Start: 2018-10-12 | End: 2019-01-29 | Stop reason: SDUPTHER

## 2018-10-12 NOTE — PROGRESS NOTES
Subjective:      Patient ID: Joanna Rosales 69 y.o. male. is here for evaluation for elevated BP      HPI  Mr. Azael Veliz has been splitting his Losartan HCTZ it 1/2 and taking 1/2 tab BID since visit on 9/27 because the medication was not lasting 24 hours. BP at home at 5 AM today 140/72. Took his Losartan. 202/98 at 7:45. Came down 153/76 3 hours. BP since we split the Losartan to BID range 92/59 to 175/88. Did not feel light headed or fatigued when BP was low. Is not taking Sudafed, NSAIDS>    Low sodium diet. Patient denies any exertional chest pain, dyspnea, palpitations, syncope, orthopnea, edema or paroxysmal nocturnal dyspnea. AICD has not fired. The patient denies any symptoms of neurological impairment or TIA's; no amaurosis, diplopia, dysphasia, or unilateral disturbance of motor or sensory function. No loss of balance or vertigo. Outpatient Prescriptions Marked as Taking for the 10/12/18 encounter (Office Visit) with Wali Ricardo MD   Medication Sig Dispense Refill    losartan-hydrochlorothiazide (HYZAAR) 100-25 MG per tablet Take 0.5 tablets by mouth 2 times daily 90 tablet 1    simvastatin (ZOCOR) 20 MG tablet TAKE 1 TABLET BY MOUTH EVERY EVENING 90 tablet 3    sotalol (BETAPACE) 80 MG tablet TAKE 1 TABLET BY MOUTH TWICE DAILY 180 tablet 3    metoprolol tartrate (LOPRESSOR) 25 MG tablet Take 0.5 tablets by mouth 2 times daily 90 tablet 3    magnesium oxide (MAG-OX) 400 MG tablet Take 1 tablet by mouth daily. (Patient taking differently:   Take 250 mg by mouth daily ) 90 tablet 2    aspirin 81 MG EC tablet Take 81 mg by mouth daily.           No Known Allergies    Patient Active Problem List   Diagnosis    Essential hypertension, benign    Hypertrophy of prostate without urinary obstruction and other lower urinary tract symptoms (LUTS)    Occlusion and stenosis of carotid artery    Automatic implantable cardioverter-defibrillator in situ    CAD (coronary artery

## 2018-11-05 ENCOUNTER — PATIENT MESSAGE (OUTPATIENT)
Dept: FAMILY MEDICINE CLINIC | Age: 78
End: 2018-11-05

## 2018-11-05 ENCOUNTER — TELEPHONE (OUTPATIENT)
Dept: CARDIOLOGY CLINIC | Age: 78
End: 2018-11-05

## 2018-11-05 DIAGNOSIS — I10 ESSENTIAL HYPERTENSION, BENIGN: Primary | Chronic | ICD-10-CM

## 2018-11-05 RX ORDER — AMLODIPINE BESYLATE 5 MG/1
5 TABLET ORAL DAILY
Qty: 30 TABLET | Refills: 5 | Status: SHIPPED | OUTPATIENT
Start: 2018-11-05 | End: 2019-04-01 | Stop reason: SDUPTHER

## 2018-11-05 NOTE — TELEPHONE ENCOUNTER
Pt calling got a letter his transmission wasn't received. Pt sent one today wants to know if it was received today?  Pls call to advise Thank you

## 2018-11-13 ENCOUNTER — NURSE ONLY (OUTPATIENT)
Dept: CARDIOLOGY CLINIC | Age: 78
End: 2018-11-13
Payer: MEDICARE

## 2018-11-13 DIAGNOSIS — Z95.810 AUTOMATIC IMPLANTABLE CARDIOVERTER-DEFIBRILLATOR IN SITU: Chronic | ICD-10-CM

## 2018-11-13 PROCEDURE — 93283 PRGRMG EVAL IMPLANTABLE DFB: CPT | Performed by: INTERNAL MEDICINE

## 2018-12-10 ENCOUNTER — OFFICE VISIT (OUTPATIENT)
Dept: FAMILY MEDICINE CLINIC | Age: 78
End: 2018-12-10
Payer: MEDICARE

## 2018-12-10 VITALS
BODY MASS INDEX: 24.69 KG/M2 | RESPIRATION RATE: 12 BRPM | SYSTOLIC BLOOD PRESSURE: 178 MMHG | TEMPERATURE: 97.5 F | WEIGHT: 153 LBS | DIASTOLIC BLOOD PRESSURE: 80 MMHG | OXYGEN SATURATION: 95 % | HEART RATE: 88 BPM

## 2018-12-10 DIAGNOSIS — I10 ESSENTIAL HYPERTENSION, BENIGN: Chronic | ICD-10-CM

## 2018-12-10 DIAGNOSIS — J20.9 ACUTE BRONCHITIS, UNSPECIFIED ORGANISM: Primary | ICD-10-CM

## 2018-12-10 PROCEDURE — 99213 OFFICE O/P EST LOW 20 MIN: CPT | Performed by: FAMILY MEDICINE

## 2018-12-10 RX ORDER — AMOXICILLIN AND CLAVULANATE POTASSIUM 875; 125 MG/1; MG/1
1 TABLET, FILM COATED ORAL 2 TIMES DAILY
Qty: 20 TABLET | Refills: 0 | Status: SHIPPED | OUTPATIENT
Start: 2018-12-10 | End: 2018-12-20

## 2019-01-29 DIAGNOSIS — I10 ESSENTIAL HYPERTENSION, BENIGN: Chronic | ICD-10-CM

## 2019-01-29 RX ORDER — IRBESARTAN AND HYDROCHLOROTHIAZIDE 300; 12.5 MG/1; MG/1
TABLET, FILM COATED ORAL
Qty: 90 TABLET | Refills: 1 | Status: SHIPPED | OUTPATIENT
Start: 2019-01-29 | End: 2019-09-23 | Stop reason: SDUPTHER

## 2019-01-30 ENCOUNTER — TELEPHONE (OUTPATIENT)
Dept: FAMILY MEDICINE CLINIC | Age: 79
End: 2019-01-30

## 2019-02-19 ENCOUNTER — NURSE ONLY (OUTPATIENT)
Dept: CARDIOLOGY CLINIC | Age: 79
End: 2019-02-19
Payer: MEDICARE

## 2019-02-19 DIAGNOSIS — Z95.810 CARDIAC DEFIBRILLATOR IN PLACE: ICD-10-CM

## 2019-02-19 DIAGNOSIS — I47.20 VENTRICULAR TACHYCARDIA: ICD-10-CM

## 2019-02-19 PROCEDURE — 93296 REM INTERROG EVL PM/IDS: CPT | Performed by: INTERNAL MEDICINE

## 2019-02-19 PROCEDURE — 93295 DEV INTERROG REMOTE 1/2/MLT: CPT | Performed by: INTERNAL MEDICINE

## 2019-02-27 ENCOUNTER — TELEPHONE (OUTPATIENT)
Dept: CARDIOLOGY CLINIC | Age: 79
End: 2019-02-27

## 2019-02-27 RX ORDER — SOTALOL HYDROCHLORIDE 80 MG/1
TABLET ORAL
Qty: 180 TABLET | Refills: 3 | Status: SHIPPED | OUTPATIENT
Start: 2019-02-27 | End: 2020-02-28 | Stop reason: SDUPTHER

## 2019-03-29 DIAGNOSIS — I10 ESSENTIAL HYPERTENSION, BENIGN: Chronic | ICD-10-CM

## 2019-04-01 RX ORDER — AMLODIPINE BESYLATE 5 MG/1
5 TABLET ORAL DAILY
Qty: 90 TABLET | Refills: 1 | Status: SHIPPED | OUTPATIENT
Start: 2019-04-01 | End: 2019-08-30 | Stop reason: SDUPTHER

## 2019-06-07 PROCEDURE — 93295 DEV INTERROG REMOTE 1/2/MLT: CPT | Performed by: INTERNAL MEDICINE

## 2019-06-07 PROCEDURE — 93296 REM INTERROG EVL PM/IDS: CPT | Performed by: INTERNAL MEDICINE

## 2019-06-18 ENCOUNTER — NURSE ONLY (OUTPATIENT)
Dept: CARDIOLOGY CLINIC | Age: 79
End: 2019-06-18
Payer: MEDICARE

## 2019-06-18 DIAGNOSIS — Z95.810 CARDIAC DEFIBRILLATOR IN PLACE: ICD-10-CM

## 2019-06-27 DIAGNOSIS — E78.2 MIXED HYPERLIPIDEMIA: Chronic | ICD-10-CM

## 2019-06-27 RX ORDER — SIMVASTATIN 20 MG
TABLET ORAL
Qty: 90 TABLET | Refills: 1 | Status: SHIPPED | OUTPATIENT
Start: 2019-06-27 | End: 2020-07-13 | Stop reason: SDUPTHER

## 2019-08-12 NOTE — PROGRESS NOTES
· Skin: Skin is warm, No rash noted. · Psychiatric: Has a normal behavior       Assessment:    Ventricular tachycardia    No episode of ventricular tachycardia since treatment with sotalol. Patient is physically active and denies having any exertional chest pressure. Sotalol 80 mg bid    on today's ECG    Continue medical therapy    Dual chamber defibrillator in place -  The CIED was interrogated and programmed and I supervised and reviewed all the data. All findings and changes are in device interrogation sheat and reflect my personal interpretation and changes and is scanned to Epic. Device interrogation today shows No VT, AP 17%,  <0.1%   No VT noted    Mild non-obstructive CAD (coronary artery disease) -    9/28/12 LHC: EF 60%, WM normal, LM nl, LAD nl, LAD nl, Diag2 prox 20%, circ nl, OM1 ostial 30%, RCA LI. Stable. ASA 81 mg daily   Simvastatin 20 mg daily   Metoprolol 12.5 mg twice a day   Losartan 100 mg daily    Hyperlipidemia -   Simvastatin. HTN:  Stable. Home BP monitoring encourage with a BP goal <130/80    1 year f/u     - The patient is counseled to follow a low salt diet to assure blood pressure remains controlled for cardiovascular risk factor modification.   - The patient is counseled to avoid excess caffeine, and energy drinks as this may exacerbated ectopy and arrhythmia. - The patient is counseled to get regular exercise 3-5 times per week to control cardiovascular risk factors. NOTE: This report was transcribed using voice recognition software. Every effort was made to ensure accuracy, however, inadvertent computerized transcription errors may be present. Haylie Peterson MD, MPH  [facility]   Office: [phone]       Scribe attestation:  This note was scribed in the presence of Haylie Peterson MD by Bess Alebrto, RN  Physician Attestation: I, Dr. Haylie Peterson, confirm that the scribe's documentation has been prepared under my

## 2019-08-13 ENCOUNTER — PROCEDURE VISIT (OUTPATIENT)
Dept: CARDIOLOGY CLINIC | Age: 79
End: 2019-08-13
Payer: MEDICARE

## 2019-08-13 ENCOUNTER — OFFICE VISIT (OUTPATIENT)
Dept: CARDIOLOGY CLINIC | Age: 79
End: 2019-08-13
Payer: MEDICARE

## 2019-08-13 VITALS
DIASTOLIC BLOOD PRESSURE: 70 MMHG | RESPIRATION RATE: 18 BRPM | SYSTOLIC BLOOD PRESSURE: 145 MMHG | BODY MASS INDEX: 22.48 KG/M2 | WEIGHT: 157 LBS | HEART RATE: 60 BPM | HEIGHT: 70 IN

## 2019-08-13 DIAGNOSIS — I47.20 VENTRICULAR TACHYCARDIA: Primary | ICD-10-CM

## 2019-08-13 DIAGNOSIS — Z95.810 AUTOMATIC IMPLANTABLE CARDIOVERTER-DEFIBRILLATOR IN SITU: Chronic | ICD-10-CM

## 2019-08-13 DIAGNOSIS — E78.2 MIXED HYPERLIPIDEMIA: Chronic | ICD-10-CM

## 2019-08-13 DIAGNOSIS — I10 ESSENTIAL HYPERTENSION, BENIGN: Chronic | ICD-10-CM

## 2019-08-13 PROCEDURE — 99214 OFFICE O/P EST MOD 30 MIN: CPT | Performed by: INTERNAL MEDICINE

## 2019-08-13 PROCEDURE — 93283 PRGRMG EVAL IMPLANTABLE DFB: CPT | Performed by: INTERNAL MEDICINE

## 2019-08-30 DIAGNOSIS — I10 ESSENTIAL HYPERTENSION, BENIGN: Chronic | ICD-10-CM

## 2019-09-03 RX ORDER — AMLODIPINE BESYLATE 5 MG/1
TABLET ORAL
Qty: 90 TABLET | Refills: 0 | Status: SHIPPED | OUTPATIENT
Start: 2019-09-03 | End: 2019-12-30

## 2019-09-23 DIAGNOSIS — I10 ESSENTIAL HYPERTENSION, BENIGN: Chronic | ICD-10-CM

## 2019-09-23 RX ORDER — IRBESARTAN AND HYDROCHLOROTHIAZIDE 300; 12.5 MG/1; MG/1
TABLET, FILM COATED ORAL
Qty: 84 TABLET | Refills: 0 | Status: SHIPPED | OUTPATIENT
Start: 2019-09-23 | End: 2020-01-03

## 2019-10-28 ENCOUNTER — OFFICE VISIT (OUTPATIENT)
Dept: FAMILY MEDICINE CLINIC | Age: 79
End: 2019-10-28
Payer: MEDICARE

## 2019-10-28 VITALS
DIASTOLIC BLOOD PRESSURE: 53 MMHG | RESPIRATION RATE: 12 BRPM | WEIGHT: 157.4 LBS | BODY MASS INDEX: 22.58 KG/M2 | TEMPERATURE: 98.5 F | OXYGEN SATURATION: 90 % | SYSTOLIC BLOOD PRESSURE: 104 MMHG | HEART RATE: 74 BPM

## 2019-10-28 DIAGNOSIS — J06.9 VIRAL URI: Primary | ICD-10-CM

## 2019-10-28 DIAGNOSIS — R50.9 FEVER, UNSPECIFIED FEVER CAUSE: ICD-10-CM

## 2019-10-28 PROCEDURE — 87804 INFLUENZA ASSAY W/OPTIC: CPT | Performed by: FAMILY MEDICINE

## 2019-10-28 PROCEDURE — 99213 OFFICE O/P EST LOW 20 MIN: CPT | Performed by: FAMILY MEDICINE

## 2019-10-28 RX ORDER — ASPIRIN 325 MG
325 TABLET ORAL DAILY
Status: ON HOLD | COMMUNITY
End: 2020-07-08

## 2019-10-28 ASSESSMENT — PATIENT HEALTH QUESTIONNAIRE - PHQ9
SUM OF ALL RESPONSES TO PHQ QUESTIONS 1-9: 0
1. LITTLE INTEREST OR PLEASURE IN DOING THINGS: 0
SUM OF ALL RESPONSES TO PHQ9 QUESTIONS 1 & 2: 0
SUM OF ALL RESPONSES TO PHQ QUESTIONS 1-9: 0
2. FEELING DOWN, DEPRESSED OR HOPELESS: 0

## 2019-11-19 ENCOUNTER — NURSE ONLY (OUTPATIENT)
Dept: CARDIOLOGY CLINIC | Age: 79
End: 2019-11-19
Payer: MEDICARE

## 2019-11-19 DIAGNOSIS — Z95.810 CARDIAC DEFIBRILLATOR IN PLACE: ICD-10-CM

## 2019-11-19 PROCEDURE — 93296 REM INTERROG EVL PM/IDS: CPT | Performed by: INTERNAL MEDICINE

## 2019-11-19 PROCEDURE — 93295 DEV INTERROG REMOTE 1/2/MLT: CPT | Performed by: INTERNAL MEDICINE

## 2019-12-05 ENCOUNTER — OFFICE VISIT (OUTPATIENT)
Dept: FAMILY MEDICINE CLINIC | Age: 79
End: 2019-12-05
Payer: MEDICARE

## 2019-12-05 VITALS
HEART RATE: 66 BPM | DIASTOLIC BLOOD PRESSURE: 69 MMHG | TEMPERATURE: 97.8 F | WEIGHT: 151.4 LBS | BODY MASS INDEX: 21.72 KG/M2 | SYSTOLIC BLOOD PRESSURE: 124 MMHG | OXYGEN SATURATION: 96 %

## 2019-12-05 DIAGNOSIS — J01.00 ACUTE NON-RECURRENT MAXILLARY SINUSITIS: Primary | ICD-10-CM

## 2019-12-05 DIAGNOSIS — H90.3 SENSORINEURAL HEARING LOSS (SNHL) OF BOTH EARS: ICD-10-CM

## 2019-12-05 DIAGNOSIS — R53.83 FATIGUE, UNSPECIFIED TYPE: ICD-10-CM

## 2019-12-05 DIAGNOSIS — I10 ESSENTIAL HYPERTENSION, BENIGN: Chronic | ICD-10-CM

## 2019-12-05 DIAGNOSIS — E78.2 MIXED HYPERLIPIDEMIA: Chronic | ICD-10-CM

## 2019-12-05 PROCEDURE — 99214 OFFICE O/P EST MOD 30 MIN: CPT | Performed by: FAMILY MEDICINE

## 2019-12-05 RX ORDER — AMOXICILLIN AND CLAVULANATE POTASSIUM 875; 125 MG/1; MG/1
1 TABLET, FILM COATED ORAL 2 TIMES DAILY
Qty: 20 TABLET | Refills: 0 | Status: SHIPPED | OUTPATIENT
Start: 2019-12-05 | End: 2019-12-15

## 2019-12-08 DIAGNOSIS — D75.839 THROMBOCYTOSIS: Primary | ICD-10-CM

## 2019-12-11 ENCOUNTER — PROCEDURE VISIT (OUTPATIENT)
Dept: AUDIOLOGY | Age: 79
End: 2019-12-11
Payer: MEDICARE

## 2019-12-11 DIAGNOSIS — H91.93 SEVERE HEARING LOSS OF BOTH EARS: Primary | ICD-10-CM

## 2019-12-11 PROCEDURE — 92557 COMPREHENSIVE HEARING TEST: CPT | Performed by: AUDIOLOGIST

## 2019-12-20 DIAGNOSIS — D75.839 THROMBOCYTOSIS: ICD-10-CM

## 2019-12-20 LAB
BASOPHILS ABSOLUTE: 0.2 K/UL (ref 0–0.2)
BASOPHILS RELATIVE PERCENT: 1.3 %
EOSINOPHILS ABSOLUTE: 0.8 K/UL (ref 0–0.6)
EOSINOPHILS RELATIVE PERCENT: 6.5 %
HCT VFR BLD CALC: 41.4 % (ref 40.5–52.5)
HEMOGLOBIN: 13.9 G/DL (ref 13.5–17.5)
LYMPHOCYTES ABSOLUTE: 3.2 K/UL (ref 1–5.1)
LYMPHOCYTES RELATIVE PERCENT: 27.1 %
MCH RBC QN AUTO: 32.2 PG (ref 26–34)
MCHC RBC AUTO-ENTMCNC: 33.7 G/DL (ref 31–36)
MCV RBC AUTO: 95.6 FL (ref 80–100)
MONOCYTES ABSOLUTE: 1.4 K/UL (ref 0–1.3)
MONOCYTES RELATIVE PERCENT: 11.9 %
NEUTROPHILS ABSOLUTE: 6.3 K/UL (ref 1.7–7.7)
NEUTROPHILS RELATIVE PERCENT: 53.2 %
PDW BLD-RTO: 16 % (ref 12.4–15.4)
PLATELET # BLD: 960 K/UL (ref 135–450)
PLATELET SLIDE REVIEW: ABNORMAL
PMV BLD AUTO: 8.6 FL (ref 5–10.5)
RBC # BLD: 4.33 M/UL (ref 4.2–5.9)
SLIDE REVIEW: ABNORMAL
WBC # BLD: 12 K/UL (ref 4–11)

## 2019-12-31 ENCOUNTER — TELEPHONE (OUTPATIENT)
Dept: FAMILY MEDICINE CLINIC | Age: 79
End: 2019-12-31

## 2020-01-03 RX ORDER — IRBESARTAN AND HYDROCHLOROTHIAZIDE 300; 12.5 MG/1; MG/1
TABLET, FILM COATED ORAL
Qty: 90 TABLET | Refills: 0 | Status: SHIPPED | OUTPATIENT
Start: 2020-01-03 | End: 2020-03-30

## 2020-01-14 ENCOUNTER — OFFICE VISIT (OUTPATIENT)
Dept: AUDIOLOGY | Age: 80
End: 2020-01-14

## 2020-01-14 PROCEDURE — 99999 PR OFFICE/OUTPT VISIT,PROCEDURE ONLY: CPT | Performed by: AUDIOLOGIST

## 2020-01-15 NOTE — PROGRESS NOTES
2020     Dominick Crandall (:  1940) is a 79 y.o. male, here for hearing aid consultation. referred by ENT to pursue hearing aid consult. We discussed at length style options of patient's best interest. Additionally patient was counseled on the neurocognitive benefits of amplification. We mutually agreed on NATALIE style devices, binaurally. Make: Resound, Linx-561's. $3500.00    Left earmold impression was obtained for micromold.        --Julianne Daniels on 1/15/2020 at 10:04 AM

## 2020-02-03 ENCOUNTER — PROCEDURE VISIT (OUTPATIENT)
Dept: AUDIOLOGY | Age: 80
End: 2020-02-03

## 2020-02-03 NOTE — PROGRESS NOTES
2/3/2020     Barrera Julio (:  1940) is a 79 y.o. male, seen for a hearing aid fitting. Patient seen for a trial based hearing aid fitting following a previous hearing exam. He was fit with GN Resound NATALIE style hearing aids with a power dome on the right ear and a micro-mold on the left ear. Patient was counseled on the neurocognitive benefits of his hearing aids along with expectations. Patient was shown the proper upkeep methods of the hearing aids as well as how to insert/remove them. Insurance was contacted by the patient and was told that they would cover up to $3,000 of the hearing aids, but the provider was told that they would not provide coverage for hearing aids. The patient was instructed to continue to follow up with his insurance. Patient acknowledged understanding of this and will follow up with Audiology in 2 weeks to make a decision regarding purchasing or returning the hearing aids.      --William Phil, AuD on 2/3/2020 at 1:47 PM

## 2020-02-04 ENCOUNTER — CLINICAL DOCUMENTATION (OUTPATIENT)
Dept: AUDIOLOGY | Age: 80
End: 2020-02-04

## 2020-02-04 NOTE — PROGRESS NOTES
Patient returned hearing aids today due to insurance non-coverage. It was confirmed with Dr Benito Pope and patient that hearing aids would not be covered. Patient was under initial impression that policy consisted of a 3,410 hearing coverage benefit.

## 2020-02-25 ENCOUNTER — NURSE ONLY (OUTPATIENT)
Dept: CARDIOLOGY CLINIC | Age: 80
End: 2020-02-25
Payer: MEDICARE

## 2020-02-25 PROCEDURE — 93295 DEV INTERROG REMOTE 1/2/MLT: CPT | Performed by: INTERNAL MEDICINE

## 2020-02-25 PROCEDURE — 93296 REM INTERROG EVL PM/IDS: CPT | Performed by: INTERNAL MEDICINE

## 2020-02-25 NOTE — PROGRESS NOTES
Carelink transmission shows normal sensing and pacing function. Close to KRISTINA, will continue to monitor. See interrogation for more details. Optivol is within normal range.

## 2020-02-28 ENCOUNTER — TELEPHONE (OUTPATIENT)
Dept: CARDIOLOGY CLINIC | Age: 80
End: 2020-02-28

## 2020-02-28 RX ORDER — SOTALOL HYDROCHLORIDE 80 MG/1
TABLET ORAL
Qty: 180 TABLET | Refills: 3 | Status: SHIPPED | OUTPATIENT
Start: 2020-02-28 | End: 2021-03-08

## 2020-02-28 NOTE — TELEPHONE ENCOUNTER
Patient needs refills for the following medications      sotalol (BETAPACE) 80 MG tablet, TAKE 1 TABLET BY MOUTH TWICE DAILY, 180 tablet - patient has left about a 6 day supply. metoprolol tartrate (LOPRESSOR) 25 MG tablet, Take 0.5 tablets by mouth 2 times daily, 90 tablet - patient has at least one refill that patient thought was transferred already to [address] [place] [address],[address].       Patient transferred pharmacy to:       Pharmacy:  [facility] phone [phone]    nikky

## 2020-05-26 ENCOUNTER — NURSE ONLY (OUTPATIENT)
Dept: CARDIOLOGY CLINIC | Age: 80
End: 2020-05-26
Payer: MEDICARE

## 2020-05-26 PROCEDURE — 93295 DEV INTERROG REMOTE 1/2/MLT: CPT | Performed by: INTERNAL MEDICINE

## 2020-05-26 PROCEDURE — 93296 REM INTERROG EVL PM/IDS: CPT | Performed by: INTERNAL MEDICINE

## 2020-06-22 ENCOUNTER — TELEPHONE (OUTPATIENT)
Dept: FAMILY MEDICINE CLINIC | Age: 80
End: 2020-06-22

## 2020-06-29 RX ORDER — AMLODIPINE BESYLATE 5 MG/1
TABLET ORAL
Qty: 90 TABLET | Refills: 1 | Status: ON HOLD | OUTPATIENT
Start: 2020-06-29 | End: 2020-07-08 | Stop reason: HOSPADM

## 2020-07-06 ENCOUNTER — APPOINTMENT (OUTPATIENT)
Dept: CT IMAGING | Age: 80
End: 2020-07-06
Payer: MEDICARE

## 2020-07-06 ENCOUNTER — APPOINTMENT (OUTPATIENT)
Dept: GENERAL RADIOLOGY | Age: 80
End: 2020-07-06
Payer: MEDICARE

## 2020-07-06 ENCOUNTER — TELEPHONE (OUTPATIENT)
Dept: ADMINISTRATIVE | Age: 80
End: 2020-07-06

## 2020-07-06 ENCOUNTER — HOSPITAL ENCOUNTER (OUTPATIENT)
Age: 80
Setting detail: OBSERVATION
Discharge: HOME | End: 2020-07-08
Attending: EMERGENCY MEDICINE | Admitting: INTERNAL MEDICINE
Payer: MEDICARE

## 2020-07-06 PROBLEM — G45.9 TIA (TRANSIENT ISCHEMIC ATTACK): Status: ACTIVE | Noted: 2020-07-06

## 2020-07-06 LAB
A/G RATIO: 1.3 (ref 1.1–2.2)
ABO/RH: NORMAL
ABO/RH: NORMAL
ALBUMIN SERPL-MCNC: 3.6 G/DL (ref 3.4–5)
ALP BLD-CCNC: 68 U/L (ref 40–129)
ALT SERPL-CCNC: 13 U/L (ref 10–40)
ANION GAP SERPL CALCULATED.3IONS-SCNC: 11 MMOL/L (ref 3–16)
ANISOCYTOSIS: ABNORMAL
ANTIBODY SCREEN: NORMAL
AST SERPL-CCNC: 13 U/L (ref 15–37)
BANDED NEUTROPHILS RELATIVE PERCENT: 1 % (ref 0–7)
BASOPHILS ABSOLUTE: 0 K/UL (ref 0–0.2)
BASOPHILS RELATIVE PERCENT: 0 %
BILIRUB SERPL-MCNC: 0.7 MG/DL (ref 0–1)
BILIRUBIN URINE: NEGATIVE
BLOOD BANK DISPENSE STATUS: NORMAL
BLOOD BANK PRODUCT CODE: NORMAL
BLOOD SMEAR REVIEW: NORMAL
BLOOD, URINE: NEGATIVE
BPU ID: NORMAL
BUN BLDV-MCNC: 19 MG/DL (ref 7–20)
CALCIUM SERPL-MCNC: 8.8 MG/DL (ref 8.3–10.6)
CHLORIDE BLD-SCNC: 101 MMOL/L (ref 99–110)
CLARITY: CLEAR
CO2: 26 MMOL/L (ref 21–32)
COLOR: YELLOW
CREAT SERPL-MCNC: 1 MG/DL (ref 0.8–1.3)
DESCRIPTION BLOOD BANK: NORMAL
EKG ATRIAL RATE: 73 BPM
EKG DIAGNOSIS: NORMAL
EKG P AXIS: 52 DEGREES
EKG P-R INTERVAL: 188 MS
EKG Q-T INTERVAL: 408 MS
EKG QRS DURATION: 94 MS
EKG QTC CALCULATION (BAZETT): 449 MS
EKG R AXIS: -5 DEGREES
EKG T AXIS: 49 DEGREES
EKG VENTRICULAR RATE: 73 BPM
EOSINOPHILS ABSOLUTE: 0 K/UL (ref 0–0.6)
EOSINOPHILS RELATIVE PERCENT: 0 %
GFR AFRICAN AMERICAN: >60
GFR NON-AFRICAN AMERICAN: >60
GLOBULIN: 2.7 G/DL
GLUCOSE BLD-MCNC: 122 MG/DL (ref 70–99)
GLUCOSE URINE: NEGATIVE MG/DL
HCT VFR BLD CALC: 20.2 % (ref 40.5–52.5)
HEMOGLOBIN: 7.1 G/DL (ref 13.5–17.5)
INR BLD: 0.99 (ref 0.86–1.14)
KETONES, URINE: NEGATIVE MG/DL
LACTATE DEHYDROGENASE: 173 U/L (ref 100–190)
LEUKOCYTE ESTERASE, URINE: NEGATIVE
LYMPHOCYTES ABSOLUTE: 2 K/UL (ref 1–5.1)
LYMPHOCYTES RELATIVE PERCENT: 61 %
MACROCYTES: ABNORMAL
MCH RBC QN AUTO: 46.3 PG (ref 26–34)
MCHC RBC AUTO-ENTMCNC: 35.1 G/DL (ref 31–36)
MCV RBC AUTO: 131.8 FL (ref 80–100)
MICROSCOPIC EXAMINATION: NORMAL
MONOCYTES ABSOLUTE: 0 K/UL (ref 0–1.3)
MONOCYTES RELATIVE PERCENT: 1 %
NEUTROPHILS ABSOLUTE: 1.3 K/UL (ref 1.7–7.7)
NEUTROPHILS RELATIVE PERCENT: 37 %
NITRITE, URINE: NEGATIVE
NUCLEATED RED BLOOD CELLS: 1 /100 WBC
NUCLEATED RED BLOOD CELLS: 1 /100 WBC
OVALOCYTES: ABNORMAL
PDW BLD-RTO: 24.9 % (ref 12.4–15.4)
PH UA: 6.5 (ref 5–8)
PLATELET # BLD: 124 K/UL (ref 135–450)
PLATELET SLIDE REVIEW: ADEQUATE
PMV BLD AUTO: 8.5 FL (ref 5–10.5)
POIKILOCYTES: ABNORMAL
POLYCHROMASIA: ABNORMAL
POTASSIUM REFLEX MAGNESIUM: 3.8 MMOL/L (ref 3.5–5.1)
PROTEIN UA: NEGATIVE MG/DL
PROTHROMBIN TIME: 11.5 SEC (ref 10–13.2)
RBC # BLD: 1.54 M/UL (ref 4.2–5.9)
SLIDE REVIEW: ABNORMAL
SMUDGE CELLS: PRESENT
SODIUM BLD-SCNC: 138 MMOL/L (ref 136–145)
SPECIFIC GRAVITY UA: 1.01 (ref 1–1.03)
TARGET CELLS: ABNORMAL
TEAR DROP CELLS: ABNORMAL
TOTAL PROTEIN: 6.3 G/DL (ref 6.4–8.2)
TROPONIN: <0.01 NG/ML
URINE REFLEX TO CULTURE: NORMAL
URINE TYPE: NORMAL
UROBILINOGEN, URINE: 0.2 E.U./DL
WBC # BLD: 3.3 K/UL (ref 4–11)

## 2020-07-06 PROCEDURE — 93005 ELECTROCARDIOGRAM TRACING: CPT | Performed by: EMERGENCY MEDICINE

## 2020-07-06 PROCEDURE — 86900 BLOOD TYPING SEROLOGIC ABO: CPT

## 2020-07-06 PROCEDURE — G0378 HOSPITAL OBSERVATION PER HR: HCPCS

## 2020-07-06 PROCEDURE — 99285 EMERGENCY DEPT VISIT HI MDM: CPT

## 2020-07-06 PROCEDURE — 86901 BLOOD TYPING SEROLOGIC RH(D): CPT

## 2020-07-06 PROCEDURE — 86850 RBC ANTIBODY SCREEN: CPT

## 2020-07-06 PROCEDURE — 36415 COLL VENOUS BLD VENIPUNCTURE: CPT

## 2020-07-06 PROCEDURE — 2580000003 HC RX 258: Performed by: INTERNAL MEDICINE

## 2020-07-06 PROCEDURE — 70496 CT ANGIOGRAPHY HEAD: CPT

## 2020-07-06 PROCEDURE — 6360000002 HC RX W HCPCS: Performed by: INTERNAL MEDICINE

## 2020-07-06 PROCEDURE — 83615 LACTATE (LD) (LDH) ENZYME: CPT

## 2020-07-06 PROCEDURE — 6370000000 HC RX 637 (ALT 250 FOR IP): Performed by: INTERNAL MEDICINE

## 2020-07-06 PROCEDURE — 82607 VITAMIN B-12: CPT

## 2020-07-06 PROCEDURE — 36430 TRANSFUSION BLD/BLD COMPNT: CPT

## 2020-07-06 PROCEDURE — 6360000004 HC RX CONTRAST MEDICATION: Performed by: EMERGENCY MEDICINE

## 2020-07-06 PROCEDURE — 85610 PROTHROMBIN TIME: CPT

## 2020-07-06 PROCEDURE — 86923 COMPATIBILITY TEST ELECTRIC: CPT

## 2020-07-06 PROCEDURE — 84484 ASSAY OF TROPONIN QUANT: CPT

## 2020-07-06 PROCEDURE — 70450 CT HEAD/BRAIN W/O DYE: CPT

## 2020-07-06 PROCEDURE — 83540 ASSAY OF IRON: CPT

## 2020-07-06 PROCEDURE — 82728 ASSAY OF FERRITIN: CPT

## 2020-07-06 PROCEDURE — 82746 ASSAY OF FOLIC ACID SERUM: CPT

## 2020-07-06 PROCEDURE — 2580000003 HC RX 258: Performed by: PHYSICIAN ASSISTANT

## 2020-07-06 PROCEDURE — 80053 COMPREHEN METABOLIC PANEL: CPT

## 2020-07-06 PROCEDURE — P9016 RBC LEUKOCYTES REDUCED: HCPCS

## 2020-07-06 PROCEDURE — 83010 ASSAY OF HAPTOGLOBIN QUANT: CPT

## 2020-07-06 PROCEDURE — 93010 ELECTROCARDIOGRAM REPORT: CPT | Performed by: INTERNAL MEDICINE

## 2020-07-06 PROCEDURE — 96372 THER/PROPH/DIAG INJ SC/IM: CPT

## 2020-07-06 PROCEDURE — 85025 COMPLETE CBC W/AUTO DIFF WBC: CPT

## 2020-07-06 PROCEDURE — 81003 URINALYSIS AUTO W/O SCOPE: CPT

## 2020-07-06 PROCEDURE — 84466 ASSAY OF TRANSFERRIN: CPT

## 2020-07-06 PROCEDURE — 71046 X-RAY EXAM CHEST 2 VIEWS: CPT

## 2020-07-06 RX ORDER — SODIUM CHLORIDE 0.9 % (FLUSH) 0.9 %
10 SYRINGE (ML) INJECTION EVERY 12 HOURS SCHEDULED
Status: DISCONTINUED | OUTPATIENT
Start: 2020-07-06 | End: 2020-07-08 | Stop reason: HOSPADM

## 2020-07-06 RX ORDER — ATORVASTATIN CALCIUM 20 MG/1
20 TABLET, FILM COATED ORAL DAILY
Status: DISCONTINUED | OUTPATIENT
Start: 2020-07-06 | End: 2020-07-06 | Stop reason: SDUPTHER

## 2020-07-06 RX ORDER — IRBESARTAN AND HYDROCHLOROTHIAZIDE 300; 12.5 MG/1; MG/1
1 TABLET, FILM COATED ORAL DAILY
Status: DISCONTINUED | OUTPATIENT
Start: 2020-07-06 | End: 2020-07-06 | Stop reason: CLARIF

## 2020-07-06 RX ORDER — SOTALOL HYDROCHLORIDE 80 MG/1
80 TABLET ORAL 2 TIMES DAILY
Status: DISCONTINUED | OUTPATIENT
Start: 2020-07-07 | End: 2020-07-08 | Stop reason: HOSPADM

## 2020-07-06 RX ORDER — HYDROCHLOROTHIAZIDE 25 MG/1
12.5 TABLET ORAL DAILY
Status: DISCONTINUED | OUTPATIENT
Start: 2020-07-07 | End: 2020-07-07

## 2020-07-06 RX ORDER — ONDANSETRON 2 MG/ML
4 INJECTION INTRAMUSCULAR; INTRAVENOUS EVERY 6 HOURS PRN
Status: DISCONTINUED | OUTPATIENT
Start: 2020-07-06 | End: 2020-07-06

## 2020-07-06 RX ORDER — ASPIRIN 81 MG/1
81 TABLET ORAL DAILY
Status: DISCONTINUED | OUTPATIENT
Start: 2020-07-07 | End: 2020-07-08 | Stop reason: HOSPADM

## 2020-07-06 RX ORDER — 0.9 % SODIUM CHLORIDE 0.9 %
20 INTRAVENOUS SOLUTION INTRAVENOUS ONCE
Status: COMPLETED | OUTPATIENT
Start: 2020-07-06 | End: 2020-07-06

## 2020-07-06 RX ORDER — AMLODIPINE BESYLATE 5 MG/1
5 TABLET ORAL DAILY
Status: DISCONTINUED | OUTPATIENT
Start: 2020-07-07 | End: 2020-07-07

## 2020-07-06 RX ORDER — AMLODIPINE BESYLATE 5 MG/1
5 TABLET ORAL DAILY
Status: DISCONTINUED | OUTPATIENT
Start: 2020-07-06 | End: 2020-07-06

## 2020-07-06 RX ORDER — PROMETHAZINE HYDROCHLORIDE 25 MG/1
12.5 TABLET ORAL EVERY 6 HOURS PRN
Status: DISCONTINUED | OUTPATIENT
Start: 2020-07-06 | End: 2020-07-08 | Stop reason: HOSPADM

## 2020-07-06 RX ORDER — POLYETHYLENE GLYCOL 3350 17 G/17G
17 POWDER, FOR SOLUTION ORAL DAILY PRN
Status: DISCONTINUED | OUTPATIENT
Start: 2020-07-06 | End: 2020-07-08 | Stop reason: HOSPADM

## 2020-07-06 RX ORDER — ROSUVASTATIN CALCIUM 20 MG/1
40 TABLET, COATED ORAL NIGHTLY
Status: DISCONTINUED | OUTPATIENT
Start: 2020-07-06 | End: 2020-07-08 | Stop reason: HOSPADM

## 2020-07-06 RX ORDER — HYDROXYUREA 500 MG/1
1500 CAPSULE ORAL DAILY
Status: ON HOLD | COMMUNITY
End: 2020-07-08 | Stop reason: HOSPADM

## 2020-07-06 RX ORDER — LOSARTAN POTASSIUM 100 MG/1
100 TABLET ORAL DAILY
Status: DISCONTINUED | OUTPATIENT
Start: 2020-07-07 | End: 2020-07-08 | Stop reason: HOSPADM

## 2020-07-06 RX ORDER — SODIUM CHLORIDE 0.9 % (FLUSH) 0.9 %
10 SYRINGE (ML) INJECTION PRN
Status: DISCONTINUED | OUTPATIENT
Start: 2020-07-06 | End: 2020-07-08 | Stop reason: HOSPADM

## 2020-07-06 RX ADMIN — Medication 10 ML: at 20:44

## 2020-07-06 RX ADMIN — IOPAMIDOL 75 ML: 755 INJECTION, SOLUTION INTRAVENOUS at 14:05

## 2020-07-06 RX ADMIN — ROSUVASTATIN CALCIUM 40 MG: 20 TABLET, FILM COATED ORAL at 20:44

## 2020-07-06 RX ADMIN — SODIUM CHLORIDE: 9 INJECTION, SOLUTION INTRAVENOUS at 18:20

## 2020-07-06 RX ADMIN — ENOXAPARIN SODIUM 40 MG: 40 INJECTION SUBCUTANEOUS at 19:50

## 2020-07-06 ASSESSMENT — ENCOUNTER SYMPTOMS
WHEEZING: 0
DIARRHEA: 0
RHINORRHEA: 0
VOMITING: 0
ABDOMINAL PAIN: 0
COUGH: 0
SHORTNESS OF BREATH: 0
NAUSEA: 0

## 2020-07-06 ASSESSMENT — PAIN SCALES - GENERAL
PAINLEVEL_OUTOF10: 0

## 2020-07-06 NOTE — CONSULTS
[facility]   CONSULT NOTE    7/6/2020 3:35 PM    Patient Information: Irene Hilton   Date of Admit:  7/6/2020  Primary Care Physician:  Vasiliy Lyles MD  Requesting Physician:  Mendy Dangelo, *    Reason for consult:   Evaluation and recommendations for pancytopenia. Chief complaint:    Chief Complaint   Patient presents with    Altered Mental Status     pt c/o confusion and intermittent visual changes for several months. PCP ordered CT for tomorrow but this AM started feeling \"fuzzy. \" A&O x4 and - NIH by this RN in [address] [place] [address]. History of Present Illness:  Irene Hilton is a 79 y.o. male on [facility],  service who was admitted on 7/6/2020 for transient ischemic attack. He reports having episodes of blurred vision, confusion and agitation. 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 He is a patient of Dr. Lela Veliz with essential thrombosis myeloproliferative disorder. He takes Hydrea 1,500 mg daily. He now has pancytopenia. No fevers, chills or night sweats. No recent weight loss. Past Medical History:     has a past medical history of Blood circulation, collateral, BPH w/o urinary obs/LUTS, CAD (coronary artery disease), Carotid 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, Dizziness, Elevated prostate specific antigen (PSA), Essential hypertension, benign, Mononucleosis, Pneumothorax, Pure hypercholesterolemia, Spinal stenosis of lumbar region without neurogenic claudication, and Ventricular tachycardia ([address]      Past Surgical History:    Past Surgical History:   Procedure Laterality Date    CARDIAC DEFIBRILLATOR PLACEMENT  10/10/12    dual chamber ICD Med[employer]onic    CATARACT REMOVAL WITH IMPLANT  08/2018    CHEST TUBE INSERTION      OTHER SURGICAL HISTORY  10/2012    insertion of cardiac recorder    SPLENECTOMY          Current Medications:     sodium chloride  20 mL Intravenous Once       Allergies:    No Known Allergies Social History:    reports that he quit smoking about 29 years ago. He started smoking about 62 years ago. He has a 52.50 pack-year smoking history. He has never used smokeless tobacco. He reports that he does not drink alcohol or use drugs. Family History:     family history includes Alzheimer's Disease in his father; Cancer in his brother, brother, and mother. ROS:      Constitutional: No weight loss, No fever, No chills, No night sweats. Energy level good. Eyes: No diplopia, No transient or permanent loss of vision, No scotomata. ENT / Mouth: No epistaxis, No dysphagia, No hoarseness, No oral ulcers, No gingival bleeding. No sore throat, No postnasal drip, No nasal drip, No mouth pain, No sinus pain, No tinnitus, Normal hearing. Cardiovascular: No chest pain, No palpitations, No syncope, No upper extremity edema, No lower extremity edema, No calf discomfort. Respiratory: No cough. No hemoptysis, No pleurisy, No wheezing, No dyspnea. Breast: No breast mass, No pain, No nipple discharge, No change in size, No change in shape. Gastrointestinal: No abdominal pain, No abdominal cramping, No nausea, No vomiting, No constipation, No diarrhea, No hemotochezia, No melena, No jaundice, No dyspepsia, No dysphagia. Urinary: No dysuria, No hematuria, No urinary incontinence. Musculoskeletal: No muscle pain, No swollen joints, No joint redness, No bone pain, No spine tenderness. Skin: No rash, No nodules, No pruritus, No lesions. Neurologic: No confusion, No seizures, No syncope, No tremor, No speech change, No headache, No hiccups, No abnormal gait, No sensory changes, No weakness. Psychiatric: No depression, No anxiety, Concentration normal.  Endocrine: No polyuria, No polydipsia, No hot flashes, No thyroid symptoms. Hematologic: No epistaxis, No gingival bleeding, No petechiae, No ecchymosis. Lymphatic: No lymphadenopathy, No lymphedema.   Allergy / Immunologic: No eczema, No frequent mucous infections, No frequent respiratory infections, No recurrent urticarial, No frequent skin infections. PHYSICAL EXAM:    Vitals:  Vitals:    07/06/20 1511   BP:    Pulse: 72   Resp: 18   Temp:    SpO2: 96%      No intake or output data in the 24 hours ending 07/06/20 1535   Wt Readings from Last 3 Encounters:   07/06/20 158 lb (71.7 kg)   12/05/19 151 lb 6.4 oz (68.7 kg)   10/28/19 157 lb 6.4 oz (71.4 kg)        General appearance: Appears comfortable. Well nourished  Eyes: Sclera clear, pupils equal  ENT: Moist mucus membranes, no thrush  Neck: Trachea midline, symmetrical  Cardiovascular: Regular rhythm, normal S1, S2. No murmur, gallop, rub. No edema in  lower extremities  Respiratory: Clear to auscultation bilaterally. No wheeze. Good inspiratory effort  Gastrointestinal: Abdomen soft, not tender, not distended, normal bowel sounds  Musculoskeletal: No cyanosis in digits, warm extremities  Neurologic: Cranial nerves grossly intact, no motor or speech deficits. Psychiatric: Normal affect. Alert and oriented to time, place and person.   Skin: Warm, dry, normal turgor, no rash    DATA:  CBC:   Lab Results   Component Value Date    WBC 3.3 07/06/2020    RBC 1.54 07/06/2020    HGB 7.1 07/06/2020    HCT 20.2 07/06/2020    .8 07/06/2020    MCH 46.3 07/06/2020    MCHC 35.1 07/06/2020    RDW 24.9 07/06/2020     07/06/2020    MPV 8.5 07/06/2020     BMP:  Lab Results   Component Value Date     07/06/2020    K 3.8 07/06/2020     07/06/2020    CO2 26 07/06/2020    BUN 19 07/06/2020    CREATININE 1.0 07/06/2020    CALCIUM 8.8 07/06/2020    GFRAA >60 07/06/2020    GFRAA >60 10/10/2012    LABGLOM >60 07/06/2020    GLUCOSE 122 07/06/2020    GLUCOSE 109 03/17/2012     Magnesium:   Lab Results   Component Value Date    MG 1.5 06/26/2014    MG 2.0 10/10/2012    MG 1.8 10/09/2012     LIVER PROFILE:   Recent Labs     07/06/20  1319   AST 13*   ALT 13   BILITOT 0.7   ALKPHOS 68     PT/INR:    Lab Results Component Value Date    PROTIME 11.5 07/06/2020    INR 0.99 07/06/2020     IMAGING:    Narrative   EXAMINATION:   TWO XRAY VIEWS OF THE CHEST       7/6/2020 2:02 pm       COMPARISON:   10/11/2012 radiograph       HISTORY:   ORDERING SYSTEM PROVIDED HISTORY: dizzy   TECHNOLOGIST PROVIDED HISTORY:   Reason for exam:->dizzy   Acuity: Unknown       FINDINGS:   ICD stable on the left.  The heart, mediastinum and pulmonary vascularity are   normal.  The lungs are lucent but clear.  There are no significant skeletal   findings.           Impression   COPD with no acute finding in the chest.         Narrative   EXAMINATION:   CT OF THE HEAD WITHOUT CONTRAST  7/6/2020 2:03 pm       TECHNIQUE:   CT of the head was performed without the administration of intravenous   contrast. Dose modulation, iterative reconstruction, and/or weight based   adjustment of the mA/kV was utilized to reduce the radiation dose to as low   as reasonably achievable.       COMPARISON:   09/27/2012       HISTORY:   ORDERING SYSTEM PROVIDED HISTORY: dizzy intermittent   TECHNOLOGIST PROVIDED HISTORY:   Has a \"code stroke\" or \"stroke alert\" been called? ->No   Reason for exam:->dizzy intermittent   Reason for Exam: dizzy intermittent; mental status change   Acuity: Unknown   Type of Exam: Unknown       FINDINGS:   BRAIN/VENTRICLES: There is no acute intracerebral hemorrhage or extra-axial   fluid collection. There is mild cerebral atrophy with mild periventricular,   subcortical and deep white matter small vessel ischemic disease.       ORBITS: Status post bilateral cataract removal.       SINUSES: Mild mucosal hypertrophy involves the left maxillary sinus.  Mucous   retention cyst is present in the right sphenoid sinus.       SOFT TISSUES/SKULL:  The calvarium is intact.           Impression   1.  No acute intracranial abnormality.         Narrative   EXAMINATION:   CTA OF THE HEAD AND NECK WITH CONTRAST 7/6/2020 2:05 pm:       TECHNIQUE:   CTA of the head and neck was performed with the administration of intravenous   contrast. Multiplanar reformatted images are provided for review.  MIP images   are provided for review. Stenosis of the internal carotid arteries measured   using NASCET criteria. Dose modulation, iterative reconstruction, and/or   weight based adjustment of the mA/kV was utilized to reduce the radiation   dose to as low as reasonably achievable.       COMPARISON:   None.       HISTORY:   ORDERING SYSTEM PROVIDED HISTORY: intermittent dizziness   TECHNOLOGIST PROVIDED HISTORY:   Reason for exam:->intermittent dizziness   Reason for Exam: dizzy intermittent; mental status change   Acuity: Unknown   Type of Exam: Unknown       FINDINGS:       CTA NECK:       AORTIC ARCH/ARCH VESSELS: No dissection or arterial injury.  No significant   stenosis of the brachiocephalic or subclavian arteries.       CAROTID ARTERIES: There are 50% stenoses of both proximal cervical internal   carotid arteries.       VERTEBRAL ARTERIES: There is a severe stenosis at the origin of the left   vertebral artery.       SOFT TISSUES: There are emphysematous changes of the lungs.       BONES: No acute osseous abnormality.           CTA HEAD:       ANTERIOR CIRCULATION: No significant stenosis of the intracranial internal   carotid, anterior cerebral, or middle cerebral arteries. No aneurysm.       POSTERIOR CIRCULATION: No significant stenosis of the vertebral, basilar, or   posterior cerebral arteries. No aneurysm.       OTHER: No dural venous sinus thrombosis on this non-dedicated study.       BRAIN: See separately dictated noncontrast head CT report.           Impression   No large vessel occlusion visualized within the head or neck.       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       50% stenosis of both proximal cervical ICAs.           IMPRESSION/RECOMMENDATIONS:    Active Problems:    * No active hospital problems.  *  Resolved Problems:    * No

## 2020-07-06 NOTE — PROGRESS NOTES
4 Eyes Skin Assessment     The patient is being assess for  Admission    I agree that 2 RN's have performed a thorough Head to Toe Skin Assessment on the patient. ALL assessment sites listed below have been assessed. Areas assessed by both nurses: Mar Ragland[x]   Head, Face, and Ears   [x]   Shoulders, Back, and Chest  [x]   Arms, Elbows, and Hands   [x]   Coccyx, Sacrum, and IschIum  [x]   Legs, Feet, and Heels        Does the Patient have Skin Breakdown?   No         Alberto Prevention initiated:  Yes   Wound Care Orders initiated:  NA      [facility] nurse consulted for Pressure Injury (Stage 3,4, Unstageable, DTI, NWPT, and Complex wounds), New and Established Ostomies:  NA      Nurse 1 eSignature: Electronically signed by Miladys Rich RN on 7/6/20 at 6:19 PM EDT    **SHARE this note so that the co-signing nurse is able to place an eSignature**    Nurse 2 eSignature: Electronically signed by Colin Garcia RN on 7/6/20 at 6:49 PM EDT

## 2020-07-06 NOTE — H&P
Hematologic/Lymphatic: Negative for bleeding tendency, easy bruising  Musculoskeletal: Negative for myalgias and arthralgias  Neurologic: positive for episodes of confusion,dysarthria. Skin: Negative for itching,rash  Psychiatric: Negative for depression,anxiety, agitation. Endocrine: Negative for polydipsia,polyuria,heat /cold intolerance. 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 Past Surgical History:   has a past surgical history that includes Splenectomy; chest tube insertion; other surgical history (10/2012); Cardiac defibrillator placement (10/10/12); and Cataract removal with implant (08/2018). Medications:  No current facility-administered medications on file prior to encounter. Current Outpatient Medications on File Prior to Encounter   Medication Sig Dispense Refill    hydroxyurea (HYDREA) 500 MG chemo capsule Take 1,500 mg by mouth daily      amLODIPine (NORVASC) 5 MG tablet TAKE 1 TABLET BY MOUTH EVERY DAY 90 tablet 1    irbesartan-hydrochlorothiazide (AVALIDE) 300-12.5 MG per tablet TAKE 1 TABLET BY MOUTH EVERY DAY 90 tablet 1    metoprolol tartrate (LOPRESSOR) 25 MG tablet Take 0.5 tablets by mouth 2 times daily 90 tablet 3    sotalol (BETAPACE) 80 MG tablet TAKE 1 TABLET BY MOUTH TWICE DAILY 180 tablet 3    simvastatin (ZOCOR) 20 MG tablet TAKE 1 TABLET BY MOUTH EVERY EVENING 90 tablet 1    magnesium oxide (MAG-OX) 400 MG tablet Take 1 tablet by mouth daily. (Patient taking differently:   Take 250 mg by mouth daily ) 90 tablet 2    aspirin 81 MG EC tablet Take 81 mg by mouth daily.       aspirin 325 MG tablet Take 325 mg by mouth daily         Allergies:  No Known Allergies     Social History:  Patient Lives at home with his wife. reports that he quit smoking about 29 years ago. He started smoking about 62 years ago. He has a 52.50 pack-year smoking history. He has never used smokeless tobacco. He reports that he does not drink alcohol or use drugs. Family History:  family history includes Alzheimer's Disease in his father; Cancer in his brother, brother, and mother. Physical Exam:  BP (!) 128/53   Pulse 74   Temp 97.3 °F (36.3 °C)   Resp 20   Ht 5' 8\" (1.727 m)   Wt 158 lb (71.7 kg)   SpO2 95%   BMI 24.02 kg/m²     General appearance:  Elderly white male, appears comfortable. Well nourished  Eyes: Sclera clear, pupils equal  ENT: Moist mucus membranes, no thrush. Trachea midline. Cardiovascular: Regular rhythm, normal S1, S2. No murmur, gallop, rub. No edema in lower extremities  Respiratory: Clear to auscultation bilaterally, no wheeze, good inspiratory effort  Gastrointestinal: Abdomen soft, non-tender, not distended, normal bowel sounds  Musculoskeletal: No cyanosis in digits, neck supple  Neurology: CNs grossly intact. Alert and oriented in time, place and person. No speech or motor deficits  Psychiatry: Appropriate affect.  Not agitated  Skin: Warm, dry, normal turgor, no rash  Brisk capillary refill, peripheral pulses palpable     Labs:  CBC:   Lab Results   Component Value Date    WBC 3.3 07/06/2020    RBC 1.54 07/06/2020    HGB 7.1 07/06/2020    HCT 20.2 07/06/2020    .8 07/06/2020    MCH 46.3 07/06/2020    MCHC 35.1 07/06/2020    RDW 24.9 07/06/2020     07/06/2020    MPV 8.5 07/06/2020     BMP:    Lab Results   Component Value Date     07/06/2020    K 3.8 07/06/2020     07/06/2020    CO2 26 07/06/2020    BUN 19 07/06/2020    CREATININE 1.0 07/06/2020    CALCIUM 8.8 07/06/2020    GFRAA >60 07/06/2020    GFRAA >60 10/10/2012    LABGLOM >60 07/06/2020    GLUCOSE 122 07/06/2020    GLUCOSE 109 03/17/2012     CTA HEAD NECK W CONTRAST   Final Result   No large vessel occlusion visualized within the head or neck. 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 50% stenosis of both proximal cervical ICAs. CT Head WO Contrast   Final Result   1. No acute intracranial abnormality. XR CHEST STANDARD (2 VW)   Final Result   COPD with no acute finding in the chest.           Chest Xray:   EKG:    I visualized CXR images and EKG strips  Discussed case  with ED provider. Problem List  Active Problems:    TIA (transient ischemic attack)  Resolved Problems:    * No resolved hospital problems. *        Assessment/Plan:     #1. Possible TIA:  Admitting patient under observation for further eval of possible TIA. CT head wo contrast was negative for bleed. CTA head/neck showed severe stenosis of left vertebral artery and 50% stenosis of both proximal cervical ICAs. Resumed pt on aspirin and statin. Pt can't get MRI brain since he has a defibrillator. Ordered echocardiogram and carotid Dopplers. Neurology evaluation in a.m. Neuro-surgery eval if appropriate. #2.  Pancytopenia:  Likely secondary to hydroxyurea which pt was taking for thrombocytosis. He was started by Dr. Giacomo Broussard in Jan/2020 and dose increased to 1500 mg. Labs in 4/2020 were WNL. Held hydroxyurea on admission. Hematology service consulted by ED provider. Patient is scheduled to get PRBC transfusion. #3.  History of hypertension:  Resumed on home meds. Bps under control. #4. Stenosis of left vertebral artery:  Neurology called. Will get surgery involved after neuro eval.    #5. H/o Vtach, S/p Defib placement:  On Sotalol and betablocker at home. Resumed on home meds. Sees Dr. Alexandria Fernandez as outpt. DVT prophylaxis: Lovenox subcu daily  Code status: full code  Diet: general diet  IV access: peripheral  Curtis Catheter: no    Admit as observation.  I anticipate hospitalization spanning more than two midnights for investigation and treatment of the above medically necessary diagnoses. Please note that some part of this chart was generated using Dragon dictation software. Although every effort was made to ensure the accuracy of this automated transcription, some errors in transcription may have occurred inadvertently. If you may need any clarification, please do not hesitate to contact me through [facility].        Agata Varma MD    7/6/2020 4:07 PM

## 2020-07-06 NOTE — PROGRESS NOTES
Patient returned to room in ed from [address]. RN Monet Dawkins aware. Patient needed to use RR , specimen cup provided just incase sample would be needed. 20g right AC IV started in CT.

## 2020-07-06 NOTE — ED NOTES
Bed: 21  Expected date:   Expected time:   Means of arrival:   Comments:  Mikki Pronto please Theadore Dancer, RN  07/06/20 5444

## 2020-07-06 NOTE — ED PROVIDER NOTES
[address] [facility]        Pt Name: Linette Zapata  MRN: [id number]  [place] 1940  Date of evaluation: 7/6/2020  Provider: Corine Martinez PA-C  PCP: Luis A Wilder MD     I have seen and evaluated this patient with my supervising physician Carine Pitts, *. CHIEF COMPLAINT       Chief Complaint   Patient presents with    Altered Mental Status     pt c/o confusion and intermittent visual changes for several months. PCP ordered CT for tomorrow but this AM started feeling \"fuzzy. \" A&O x4 and - NIH by this RN in [address] [place] [address]. HISTORY OF PRESENT ILLNESS   (Location, Timing/Onset, Context/Setting, Quality, Duration, Modifying Factors, Severity, Associated Signs and Symptoms)  Note limiting factors. Linette Zapata is a 79 y.o. male who presents for evaluation of episodes of blurred vision that is been going on for the past several months. Patient states that it would occur where he would get blurred vision this would last for approximately 20 minutes and go away on its own or after taking full-strength aspirin. Patient states these episodes seem to be getting more frequent and now he is having developing confusion and agitation. Symptoms seem to be worse over the past 2 weeks per wife. Apparently he had 1 of his worst episodes yet this morning about 2 or 3 hours prior to arrival in the ED. During this episode he had difficulty performing normal tasks such as taking off his seatbelt and getting out of the car. Patient denies any dizziness/lightheadedness, weakness, facial asymmetry or speech difficulty during these episodes. There is been no syncope. No chest pain or shortness of breath. No abdominal pain nausea vomiting or diarrhea. He states that he does have difficulty with urination, slow, decreased stream.  This seems to be getting worse but he has never been seen or evaluated for this.   He has history of region without neurogenic claudication 10/31/2017    Mild to moderate L4-5    Ventricular tachycardia ([address] 10/2012         SURGICAL HISTORY     Past Surgical History:   Procedure Laterality Date    CARDIAC DEFIBRILLATOR PLACEMENT  10/10/12    dual chamber ICD [employer]    CATARACT REMOVAL WITH IMPLANT  2018    CHEST TUBE INSERTION      OTHER SURGICAL HISTORY  10/2012    insertion of cardiac recorder    SPLENECTOMY           CURRENTMEDICATIONS       Previous Medications    AMLODIPINE (NORVASC) 5 MG TABLET    TAKE 1 TABLET BY MOUTH EVERY DAY    ASPIRIN 325 MG TABLET    Take 325 mg by mouth daily    ASPIRIN 81 MG EC TABLET    Take 81 mg by mouth daily. HYDROXYUREA (HYDREA) 500 MG CHEMO CAPSULE    Take 1,500 mg by mouth daily    IRBESARTAN-HYDROCHLOROTHIAZIDE (AVALIDE) 300-12.5 MG PER TABLET    TAKE 1 TABLET BY MOUTH EVERY DAY    MAGNESIUM OXIDE (MAG-OX) 400 MG TABLET    Take 1 tablet by mouth daily. METOPROLOL TARTRATE (LOPRESSOR) 25 MG TABLET    Take 0.5 tablets by mouth 2 times daily    SIMVASTATIN (ZOCOR) 20 MG TABLET    TAKE 1 TABLET BY MOUTH EVERY EVENING    SOTALOL (BETAPACE) 80 MG TABLET    TAKE 1 TABLET BY MOUTH TWICE DAILY         ALLERGIES     Patient has no known allergies.     FAMILYHISTORY       Family History   Problem Relation Age of Onset    Cancer Mother     Alzheimer's Disease Father     Cancer Brother         melanoma    Cancer Brother     Heart Disease Neg Hx     High Blood Pressure Neg Hx     High Cholesterol Neg Hx           SOCIAL HISTORY       Social History     Tobacco Use    Smoking status: Former Smoker     Packs/day: 1.50     Years: 35.00     Pack years: 52.50     Start date: 1958     Last attempt to quit: 1991     Years since quittin.5    Smokeless tobacco: Never Used   Substance Use Topics    Alcohol use: No    Drug use: No       SCREENINGS             PHYSICAL EXAM    (up to 7 for level 4, 8 or more for level 5)     ED Triage Vitals [20 1243] BP Temp Temp src Pulse Resp SpO2 Height Weight   (!) 122/52 97.3 °F (36.3 °C) -- 73 14 97 % 5' 8\" (1.727 m) 158 lb (71.7 kg)       Physical Exam  Vitals signs and nursing note reviewed. Constitutional:       General: He is not in acute distress. Appearance: He is well-developed. He is not ill-appearing, toxic-appearing or diaphoretic. HENT:      Head: Normocephalic and atraumatic. Right Ear: External ear normal.      Left Ear: External ear normal.      Nose: Nose normal.   Eyes:      General:         Right eye: No discharge. Left eye: No discharge. Extraocular Movements: Extraocular movements intact. Conjunctiva/sclera: Conjunctivae normal.      Pupils: Pupils are equal, round, and reactive to light. Neck:      Musculoskeletal: Normal range of motion and neck supple. Cardiovascular:      Rate and Rhythm: Normal rate and regular rhythm. Heart sounds: Normal heart sounds. Pulmonary:      Effort: Pulmonary effort is normal. No respiratory distress. Breath sounds: Normal breath sounds. Chest:      Chest wall: No tenderness. Abdominal:      General: Bowel sounds are normal. There is no distension. Palpations: Abdomen is soft. Tenderness: There is no abdominal tenderness. There is no guarding or rebound. Musculoskeletal: Normal range of motion. Skin:     General: Skin is warm and dry. Neurological:      Mental Status: He is alert and oriented to person, place, and time. Mental status is at baseline. GCS: GCS eye subscore is 4. GCS verbal subscore is 5. GCS motor subscore is 6. Cranial Nerves: Cranial nerves are intact. Sensory: Sensation is intact. Motor: Motor function is intact. Coordination: Coordination is intact. Gait: Gait is intact.    Psychiatric:         Behavior: Behavior normal.         DIAGNOSTIC RESULTS   LABS:    Labs Reviewed   CBC WITH AUTO DIFFERENTIAL - Abnormal; Notable for the following components: Result Value    WBC 3.3 (*)     RBC 1.54 (*)     Hemoglobin 7.1 (*)     Hematocrit 20.2 (*)     .8 (*)     MCH 46.3 (*)     RDW 24.9 (*)     Platelets 914 (*)     All other components within normal limits    Narrative:     Marcos Wagner  ALEXUS tel. [phone],  Hematology results called to and read back by spoke to zeina bianchi/rn/er,  07/06/2020 13:56, by KAYLA ORTIZ  Performed at:  [facility] [address] [employer], [place]   Phone [phone]   COMPREHENSIVE METABOLIC PANEL W/ REFLEX TO MG FOR LOW K - Abnormal; Notable for the following components:    Glucose 122 (*)     Total Protein 6.3 (*)     AST 13 (*)     All other components within normal limits    Narrative:     Performed at:  [facility] [address], [place]   Phone [phone]   TROPONIN    Narrative:     Performed at:  [facility] [address] [facility] [employer], [place]   Phone [phone]   PROTIME-INR    Narrative:     Performed at:  [facility] [address] [facility] [employer], [place]   Phone [phone]   URINE RT REFLEX TO CULTURE    Narrative:     Performed at:  [facility] [address] [facility] [employer], [place]   Phone [phone]   LACTATE DEHYDROGENASE    Narrative:     Performed at:  [facility] [address] [employer], [place]   Phone [phone]   BLOOD OCCULT STOOL DIAGNOSTIC   HAPTOGLOBIN   VITAMIN B12   IRON AND TIBC   FERRITIN   TRANSFERRIN   POCT GLUCOSE   TYPE AND SCREEN   TYPE AND SCREEN   PREPARE RBC (CROSSMATCH)       All other labs were within normal range or not returned as of this dictation. EKG: All EKG's are interpreted by the Emergency Department Physician in the absence of a cardiologist.  Please see their note for interpretation of EKG.       RADIOLOGY:   Non-plain film images such as CT, Ultrasound and MRI are read by the radiologist. Plain radiographic images are visualized and preliminarily interpreted by the ED Provider with the below findings:        Interpretation per the Radiologist below, if available at the time of this note:     [address]   Final Result   No large vessel occlusion visualized within the head or neck. 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 50% stenosis of both proximal cervical ICAs. CT Head WO Contrast   Final Result   1. No acute intracranial abnormality. XR CHEST STANDARD (2 VW)   Final Result   COPD with no acute finding in the chest.           Xr Chest Standard (2 Vw)    Result Date: 7/6/2020  EXAMINATION: TWO XRAY VIEWS OF THE CHEST 7/6/2020 2:02 pm COMPARISON: 10/11/2012 radiograph HISTORY: ORDERING SYSTEM PROVIDED HISTORY: dizzy TECHNOLOGIST PROVIDED HISTORY: Reason for exam:->dizzy Acuity: Unknown FINDINGS: ICD stable on the left. The heart, mediastinum and pulmonary vascularity are normal.  The lungs are lucent but clear. There are no significant skeletal findings. COPD with no acute finding in the chest.     Ct Head Wo Contrast    Result Date: 7/6/2020  EXAMINATION: CT OF THE HEAD WITHOUT CONTRAST  7/6/2020 2:03 pm TECHNIQUE: CT of the head was performed without the administration of intravenous contrast. Dose modulation, iterative reconstruction, and/or weight based adjustment of the mA/kV was utilized to reduce the radiation dose to as low as reasonably achievable. COMPARISON: 09/27/2012 HISTORY: ORDERING SYSTEM PROVIDED HISTORY: dizzy intermittent TECHNOLOGIST PROVIDED HISTORY: Has a \"code stroke\" or \"stroke alert\" been called? ->No Reason for exam:->dizzy intermittent Reason for Exam: dizzy intermittent; mental status change Acuity: Unknown Type of Exam: Unknown FINDINGS: BRAIN/VENTRICLES: There is no acute intracerebral hemorrhage or extra-axial fluid collection. There is mild cerebral atrophy with mild periventricular, subcortical and deep white matter small vessel ischemic disease. ORBITS: Status post bilateral cataract removal. SINUSES: Mild mucosal hypertrophy involves the left maxillary sinus. Mucous retention cyst is present in the right sphenoid sinus. SOFT TISSUES/SKULL:  The calvarium is intact. 1. No acute intracranial abnormality. PROCEDURES   Unless otherwise noted below, none     Procedures    CRITICAL CARE TIME   N/A    CONSULTS:  IP CONSULT TO HEM/ONC  IP CONSULT TO HOSPITALIST      EMERGENCY DEPARTMENT COURSE and DIFFERENTIAL DIAGNOSIS/MDM:   Vitals:    Vitals:    07/06/20 1243 07/06/20 1504 07/06/20 1511   BP: (!) 122/52 (!) 149/69    Pulse: 73  72   Resp: 14  18   Temp: 97.3 °F (36.3 °C)     SpO2: 97%  96%   Weight: 158 lb (71.7 kg)     Height: 5' 8\" (1.727 m)         Patient was given the following medications:  Medications   0.9 % sodium chloride bolus (has no administration in time range)   iopamidol (ISOVUE-370) 76 % injection 75 mL (75 mLs Intravenous Given 7/6/20 1405)           Patient presents for evaluation of episodes of confusion, agitation and blurred vision. On exam, he is resting comfortably in bed no acute distress and nontoxic. Vitals are stable and he is afebrile. Lungs are clear to auscultation bilaterally, chest is nontender and abdomen is benign. He is alert and oriented x3 at this time. GCS 15. Cranial nerves II through XII are intact. Moving all extremities without difficulty or focal logic deficit. No ataxia. Normal coordination and gait. Please see attending note for EKG interpretation. CBC and CMP are remarkable for pancytopenia with a hemoglobin down to 7.1 prior 14 and April of this year. Hemoccult is pending. Urinalysis is negative. Troponin is negative. Coags are within normal limits. CT the head shows no acute intracranial abnormality.   CTA shows no large vessel occlusion visualized in the head or neck with there is severe file       DISCONTINUED MEDICATIONS:  Discontinued Medications    No medications on file              (Please note that portions of this note were completed with a voice recognition program.  Efforts were made to edit the dictations but occasionally words are mis-transcribed.)    Hallie Wilson PA-C (electronically signed)           Charo Desouza PA-C  07/06/20 841 [place], [place]  07/06/20 6445

## 2020-07-06 NOTE — ED PROVIDER NOTES
TAKE 1 TABLET BY MOUTH TWICE DAILY 180 tablet 3    simvastatin (ZOCOR) 20 MG tablet TAKE 1 TABLET BY MOUTH EVERY EVENING 90 tablet 1    magnesium oxide (MAG-OX) 400 MG tablet Take 1 tablet by mouth daily. (Patient taking differently:   Take 250 mg by mouth daily ) 90 tablet 2    aspirin 81 MG EC tablet Take 81 mg by mouth daily.  aspirin 325 MG tablet Take 325 mg by mouth daily       PHYSICAL EXAM  Vitals: BP (!) 130/25   Pulse 74   Temp 97.3 °F (36.3 °C)   Resp 19   Ht 5' 8\" (1.727 m)   Wt 158 lb (71.7 kg)   SpO2 96%   BMI 24.02 kg/m²   Constitutional:  79 y.o. male alert, cooperative  HENT:  Atraumatic scalp, mucous membranes moist  Eyes:   Conjunctiva clear, no icterus  Neck:  Supple, no visible JVD, no signs of injury  Cardiovascular:  Regular, no rubs  Thorax & Lungs:  No accessory muscle usage, clear  Abdomen:  Soft, non distended, NT  Back:  No deformity  Genitalia:  Deferred  Rectal:  Deferred  Extremities:  No cyanosis, no edema, adequate perfusion  Skin:  Warm, dry  Neurologic:  Alert, no slurred speech, no facial droop, normal coordination and strength, gait is normal, light touch sensation intact, recent and remote memory intact  Psychiatric:  Affect appropriate    Medical Decision Making and Plan:  Briefly, this is an 79 y.o.male who presented with concern for an episode of blurred vision, confusion that happened earlier in the day, now completely resolved. Less severe episodes ongoing for months. Hx of abnormal blood counts but typically not pancytopenic (actually the opposite). Previously seen by Dr George Mora. Dr. Sebas Serrano consulted, recommends transfusion. Vascular abnormalities noted on imaging. Plan is to admit for further care. For further details of [facility] encounter, please see documentation by advanced practice provider WENDY Arthur.      Results for orders placed or performed during the hospital encounter of 07/06/20   CBC Auto Differential   Result Value Ref Range    WBC 3.3 (L) 4.0 - 11.0 K/uL    RBC 1.54 (L) 4.20 - 5.90 M/uL    Hemoglobin 7.1 (L) 13.5 - 17.5 g/dL    Hematocrit 20.2 (LL) 40.5 - 52.5 %    .8 (H) 80.0 - 100.0 fL    MCH 46.3 (H) 26.0 - 34.0 pg    MCHC 35.1 31.0 - 36.0 g/dL    RDW 24.9 (H) 12.4 - 15.4 %    Platelets 354 (L) 539 - 450 K/uL    MPV 8.5 5.0 - 10.5 fL    PLATELET SLIDE REVIEW Adequate     SLIDE REVIEW see below     Neutrophils % 37.0 %    Lymphocytes % 61.0 %    Monocytes % 1.0 %    Eosinophils % 0.0 %    Basophils % 0.0 %    Neutrophils Absolute 1.3 (L) 1.7 - 7.7 K/uL    Lymphocytes Absolute 2.0 1.0 - 5.1 K/uL    Monocytes Absolute 0.0 0.0 - 1.3 K/uL    Eosinophils Absolute 0.0 0.0 - 0.6 K/uL    Basophils Absolute 0.0 0.0 - 0.2 K/uL    Bands Relative 1 0 - 7 %    nRBC 1 (A) /100 WBC    nRBC 1 (A) /100 WBC    Smudge Cells Present (A)     Anisocytosis 3+ (A)     Macrocytes 4+ (A)     Polychromasia 1+ (A)     Poikilocytes Occasional (A)     Ovalocytes 1+ (A)     Target Cells Occasional (A)     Tear Drop Cells Occasional (A)    Comprehensive Metabolic Panel w/ Reflex to MG   Result Value Ref Range    Sodium 138 136 - 145 mmol/L    Potassium reflex Magnesium 3.8 3.5 - 5.1 mmol/L    Chloride 101 99 - 110 mmol/L    CO2 26 21 - 32 mmol/L    Anion Gap 11 3 - 16    Glucose 122 (H) 70 - 99 mg/dL    BUN 19 7 - 20 mg/dL    CREATININE 1.0 0.8 - 1.3 mg/dL    GFR Non-African American >60 >60    GFR African American >60 >60    Calcium 8.8 8.3 - 10.6 mg/dL    Total Protein 6.3 (L) 6.4 - 8.2 g/dL    Alb 3.6 3.4 - 5.0 g/dL    Albumin/Globulin Ratio 1.3 1.1 - 2.2    Total Bilirubin 0.7 0.0 - 1.0 mg/dL    Alkaline Phosphatase 68 40 - 129 U/L    ALT 13 10 - 40 U/L    AST 13 (L) 15 - 37 U/L    Globulin 2.7 g/dL   Troponin   Result Value Ref Range    Troponin <0.01 <0.01 ng/mL   Protime-INR   Result Value Ref Range    Protime 11.5 10.0 - 13.2 sec    INR 0.99 0.86 - 1.14   Urinalysis Reflex to Culture   Result Value Ref Range Color, UA YELLOW Straw/Yellow    Clarity, UA Clear Clear    Glucose, Ur Negative Negative mg/dL    Bilirubin Urine Negative Negative    Ketones, Urine Negative Negative mg/dL    Specific Gravity, UA 1.014 1.005 - 1.030    Blood, Urine Negative Negative    pH, UA 6.5 5.0 - 8.0    Protein, UA Negative Negative mg/dL    Urobilinogen, Urine 0.2 <2.0 E.U./dL    Nitrite, Urine Negative Negative    Leukocyte Esterase, Urine Negative Negative    Microscopic Examination Not Indicated     Urine Type NotGiven     Urine Reflex to Culture Not Indicated    Lactate Dehydrogenase   Result Value Ref Range     100 - 190 U/L   EKG 12 Lead   Result Value Ref Range    Ventricular Rate 73 BPM    Atrial Rate 73 BPM    P-R Interval 188 ms    QRS Duration 94 ms    Q-T Interval 408 ms    QTc Calculation (Bazett) 449 ms    P Axis 52 degrees    R Axis -5 degrees    T Axis 49 degrees    Diagnosis       Normal sinus rhythmNormal ECGConfirmed by [facility] SOLO TAYLOR ([id number]) on 7/6/2020 1:31:42 PM   TYPE AND SCREEN   Result Value Ref Range    ABO/Rh CANCELED     Antibody Screen NEG    PREPARE RBC (CROSSMATCH), 1 Units   Result Value Ref Range    Product Code Blood Bank [id number]     Description Blood Bank Red Blood Cells, Leuko-reduced     Unit Number [id number]     Dispense Status Blood Bank selected    ABO/RH   Result Value Ref Range    ABO/Rh O POS      RADIOLOGY:     Plain x-rays were viewed by me:   CTA HEAD NECK W CONTRAST   Final Result   No large vessel occlusion visualized within the head or neck. 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 50% stenosis of both proximal cervical ICAs. CT Head WO Contrast   Final Result   1. No acute intracranial abnormality.          XR CHEST STANDARD (2 VW)   Final Result   COPD with no acute finding in the chest.           EKG:  Read by me in the absence of a cardiologist shows:Sinus rhythm, normal rate, normal conduction intervals, normal axis, no acute injury pattern, no prior EKG available for comparison      CRITICAL CARE:   Total critical care time of 31 minutes (excludes any time for procedures). This includes but not limited to vital sign monitoring, medication, clinical response to medications, interpretation of diagnostics, review of nursing notes, pertinent record review, discussions about patient condition, consultation time, documentation time. Critical care due to the patient's symptomatic anemia, TIA. Vitals:    07/06/20 1630 07/06/20 1645 07/06/20 1700 07/06/20 1715   BP: (!) 122/55 (!) 96/38 (!) 147/69 (!) 130/25   Pulse: 75 74 75 74   Resp: 19 17 16 19   Temp:       SpO2: 93% 95% 100% 96%   Weight:       Height:         FINAL IMPRESSION:    1. TIA (transient ischemic attack)    2.  Pancytopenia ([address]    3. Stenosis of left vertebral artery       DISPOSITION Admitted 07/06/2020 04:07:01 PM       Bria Enriquez MD  07/06/20 7028

## 2020-07-06 NOTE — ED NOTES
PT given warm blankets by Dr Lev George. PT placed in gown, attached to cycling monitors and cardiac leads. Type and screen collected from established PIV.  2 tubes sent to lab. Call light in reach,  PT denies any needs at this time.       Татьяна Potter RN  07/06/20 0346

## 2020-07-06 NOTE — ED NOTES
Call from CT, PT in restroom prior to returning to room. PT tolerated well.       Jamaal Soriano RN  07/06/20 7823

## 2020-07-06 NOTE — ED NOTES
Rafael Lr at bedside to transport PT to CVU. Wheel chair at bedside.       Maday Malagon RN  07/06/20 5159

## 2020-07-07 ENCOUNTER — TELEPHONE (OUTPATIENT)
Dept: FAMILY MEDICINE CLINIC | Age: 80
End: 2020-07-07

## 2020-07-07 LAB
CHOLESTEROL, TOTAL: 126 MG/DL (ref 0–199)
FERRITIN: 881.9 NG/ML (ref 30–400)
FOLATE: 19.6 NG/ML (ref 4.78–24.2)
HAPTOGLOBIN: 40 MG/DL (ref 30–200)
HCT VFR BLD CALC: 23.4 % (ref 40.5–52.5)
HDLC SERPL-MCNC: 29 MG/DL (ref 40–60)
HEMATOLOGY PATH CONSULT: NORMAL
HEMOGLOBIN: 8.2 G/DL (ref 13.5–17.5)
IRON SATURATION: 96 % (ref 20–50)
IRON: 235 UG/DL (ref 59–158)
LDL CHOLESTEROL CALCULATED: 66 MG/DL
LEFT VENTRICULAR EJECTION FRACTION HIGH VALUE: 60 %
LEFT VENTRICULAR EJECTION FRACTION MODE: NORMAL
LV EF: 55 %
LV EF: 58 %
LVEF MODALITY: NORMAL
MCH RBC QN AUTO: 42.3 PG (ref 26–34)
MCHC RBC AUTO-ENTMCNC: 35.1 G/DL (ref 31–36)
MCV RBC AUTO: 120.2 FL (ref 80–100)
PDW BLD-RTO: 30.1 % (ref 12.4–15.4)
PLATELET # BLD: 137 K/UL (ref 135–450)
PMV BLD AUTO: 8.7 FL (ref 5–10.5)
RBC # BLD: 1.95 M/UL (ref 4.2–5.9)
TOTAL IRON BINDING CAPACITY: 245 UG/DL (ref 260–445)
TRANSFERRIN: 221 MG/DL (ref 200–360)
TRIGL SERPL-MCNC: 154 MG/DL (ref 0–150)
VITAMIN B-12: 219 PG/ML (ref 211–911)
VLDLC SERPL CALC-MCNC: 31 MG/DL
WBC # BLD: 4.4 K/UL (ref 4–11)

## 2020-07-07 PROCEDURE — 6370000000 HC RX 637 (ALT 250 FOR IP): Performed by: INTERNAL MEDICINE

## 2020-07-07 PROCEDURE — 93880 EXTRACRANIAL BILAT STUDY: CPT

## 2020-07-07 PROCEDURE — 83036 HEMOGLOBIN GLYCOSYLATED A1C: CPT

## 2020-07-07 PROCEDURE — 96372 THER/PROPH/DIAG INJ SC/IM: CPT

## 2020-07-07 PROCEDURE — 92610 EVALUATE SWALLOWING FUNCTION: CPT

## 2020-07-07 PROCEDURE — 85027 COMPLETE CBC AUTOMATED: CPT

## 2020-07-07 PROCEDURE — 93306 TTE W/DOPPLER COMPLETE: CPT

## 2020-07-07 PROCEDURE — 80061 LIPID PANEL: CPT

## 2020-07-07 PROCEDURE — 6360000002 HC RX W HCPCS: Performed by: INTERNAL MEDICINE

## 2020-07-07 PROCEDURE — G0378 HOSPITAL OBSERVATION PER HR: HCPCS

## 2020-07-07 PROCEDURE — 99219 PR INITIAL OBSERVATION CARE/DAY 50 MINUTES: CPT | Performed by: PSYCHIATRY & NEUROLOGY

## 2020-07-07 PROCEDURE — 97161 PT EVAL LOW COMPLEX 20 MIN: CPT

## 2020-07-07 PROCEDURE — 2580000003 HC RX 258: Performed by: INTERNAL MEDICINE

## 2020-07-07 PROCEDURE — 92526 ORAL FUNCTION THERAPY: CPT

## 2020-07-07 RX ADMIN — SOTALOL HYDROCHLORIDE 80 MG: 80 TABLET ORAL at 08:51

## 2020-07-07 RX ADMIN — ENOXAPARIN SODIUM 40 MG: 40 INJECTION SUBCUTANEOUS at 08:54

## 2020-07-07 RX ADMIN — ASPIRIN 81 MG: 81 TABLET, COATED ORAL at 08:51

## 2020-07-07 RX ADMIN — ROSUVASTATIN CALCIUM 40 MG: 20 TABLET, FILM COATED ORAL at 20:27

## 2020-07-07 RX ADMIN — LOSARTAN POTASSIUM 100 MG: 100 TABLET, FILM COATED ORAL at 08:51

## 2020-07-07 RX ADMIN — METOPROLOL TARTRATE 12.5 MG: 25 TABLET, FILM COATED ORAL at 20:27

## 2020-07-07 RX ADMIN — METOPROLOL TARTRATE 12.5 MG: 25 TABLET, FILM COATED ORAL at 08:51

## 2020-07-07 RX ADMIN — SOTALOL HYDROCHLORIDE 80 MG: 80 TABLET ORAL at 20:27

## 2020-07-07 RX ADMIN — Medication 10 ML: at 20:27

## 2020-07-07 ASSESSMENT — PAIN SCALES - GENERAL
PAINLEVEL_OUTOF10: 0

## 2020-07-07 NOTE — PROGRESS NOTES
[facility] PROGRESS NOTE    7/7/2020 2:08 PM        Name: Linette Zapata . Admitted: 7/6/2020  Primary Care Provider: Luis A Wilder MD (Tel: [phone])    Brief Course: Linette Zapata is a 79 y.o. male who presented with past medical history significant for CAD, hypertension, V. Tach, s/p defibrillator placement, carotid artery occlusion, thrombocytosis who presented to ED with chronic recurrent episodes of confusion, agitation and blurred vision. As per patient and his wife, he had multiple episodes over the last several months. CTA  showed severe stenosis of left vertebral artery. Labs were notable for pancytopenia. His last labs in 4/2020 were within normal range. Hematology attending was consulted from ED. Patient was scheduled to receive 1 PRBC transfusion for hemoglobin level of 7.1. CC: pancytopenia  Subjective:  Pt got a unit of PRBC overnight. Denies ongoing bleed. Pt offers no complaints. His son is at the bedside.     Reviewed interval ancillary notes    Current Medications  aspirin EC tablet 81 mg, Daily  metoprolol tartrate (LOPRESSOR) tablet 12.5 mg, BID  sodium chloride flush 0.9 % injection 10 mL, 2 times per day  sodium chloride flush 0.9 % injection 10 mL, PRN  polyethylene glycol (GLYCOLAX) packet 17 g, Daily PRN  promethazine (PHENERGAN) tablet 12.5 mg, Q6H PRN  enoxaparin (LOVENOX) injection 40 mg, Daily  perflutren lipid microspheres (DEFINITY) injection 1.65 mg, ONCE PRN  rosuvastatin (CRESTOR) tablet 40 mg, Nightly  sotalol (BETAPACE) tablet 80 mg, BID  losartan (COZAAR) tablet 100 mg, Daily    Objective:  BP (!) 162/68   Pulse 76   Temp 97.8 °F (36.6 °C) (Temporal)   Resp 18   Ht 5' 8\" (1.727 m)   Wt 159 lb 6.3 oz (72.3 kg)   SpO2 97%   BMI 24.24 kg/m²     Intake/Output Summary (Last 24 hours) at 7/7/2020 1407  Last data filed at 7/7/2020 0349  Gross per 24 hour Intake 546.99 ml   Output 1075 ml   Net -528.01 ml      Wt Readings from Last 3 Encounters:   07/07/20 159 lb 6.3 oz (72.3 kg)   12/05/19 151 lb 6.4 oz (68.7 kg)   10/28/19 157 lb 6.4 oz (71.4 kg)       General appearance:  Elderly white male, appears comfortable. Well nourished  Cardiovascular: Regular rhythm, normal S1, S2. No murmur, gallop, rub. No edema in lower extremities  Respiratory: Clear to auscultation bilaterally, no wheeze, good inspiratory effort  Gastrointestinal: Abdomen soft, non-tender, not distended, normal bowel sounds  Musculoskeletal: No cyanosis in digits, neck supple  Neurology: CNs grossly intact. Alert and oriented in time, place and person. No speech or motor deficits  Skin: Warm, dry, normal turgor, no rash  Brisk capillary refill, peripheral pulses palpable      Labs and Tests:  CBC:   Recent Labs     07/06/20  1319 07/07/20  0345   WBC 3.3* 4.4   HGB 7.1* 8.2*   * 137     BMP:    Recent Labs     07/06/20  1319      K 3.8      CO2 26   BUN 19   CREATININE 1.0   GLUCOSE 122*     Hepatic:   Recent Labs     07/06/20  1319   AST 13*   ALT 13   BILITOT 0.7   ALKPHOS 68     CTA HEAD NECK W CONTRAST   Final Result   No large vessel occlusion visualized within the head or neck. 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 50% stenosis of both proximal cervical ICAs. CT Head WO Contrast   Final Result   1. No acute intracranial abnormality. XR CHEST STANDARD (2 VW)   Final Result   COPD with no acute finding in the chest.         Vascular carotid duplex bilateral    (Results Pending)       Problem List  Active Problems:    HTN (hypertension), benign    TIA (transient ischemic attack)    Acute encephalopathy    Dementia due to Alzheimer's disease ([address]    Dyslipidemia    Pancytopenia ([address]  Resolved Problems:    * No resolved hospital problems. *       Assessment & Plan:     #1. Possible TIA:  Patient is currently asymptomatic.   CT head wo contrast was negative for bleed. CTA head/neck showed severe stenosis of left vertebral artery and 50% stenosis of both proximal cervical ICAs. On aspirin and statin. Unable to get MRI brain since his defibrillator is not compatible with MRI. 2D echocardiogram showed no abnormalities  bilateral carotid Dopplers showed <50% stenosis of b/l carotids. Patient was seen by neurology service. Recommended medical management. Passed speech eval.  Ordered PT/OT.     #2. Pancytopenia:  Likely secondary to hydroxyurea which pt was taking for thrombocytosis. Held hydroxyurea on admission. Improving cell counts noted on labs today. Repeat labs in a.m. Hematology service on board. Work-up is negative for iron deficiency anemia. Vitamin B12 and folate levels ordered and pending. Peripheral smear to be reviewed. Patient got a unit of PRBC overnight. Hemoglobin stable. No active blood loss. Hematology recommends blood transfusion if hemoglobin less than 7.     #3. History of hypertension:  Resumed on home meds. Bps under control.     #4. Stenosis of left vertebral artery:  Neurology called. Do not recommend surgical intervention.     #5. H/o V tach, S/p Defib placement:  On Sotalol and betablocker at home. Resumed on home meds.  Sees Dr. Cadence Parra as outpt.      DVT prophylaxis: Lovenox subcu daily  Code status: full code  Diet: general diet, as toelrated  IV access: peripheral  Curtis Catheter: misti Dillard MD   7/7/2020 2:08 PM

## 2020-07-07 NOTE — PROGRESS NOTES
CLINICAL BEDSIDE SWALLOWING EVALUATION  Speech Therapy Department    Patient Name:  Dona Farley  :  1940  Pain level: Pt did not report pain  Medical Diagnosis:   TIA (transient ischemic attack) [G45.9]  TIA (transient ischemic attack) [G45.9]     HPI: Per chart: Dona Farley is a 79 y.o. male who presented with past medical history significant for CAD, hypertension, V. Tach, s/p defibrillator placement, carotid artery occlusion, thrombocytosis who presented to ED with chronic recurrent episodes of confusion, agitation and blurred vision. As per patient and his wife, he had multiple episodes over the last several months. Symptoms usually last for a few minutes and resolve on their own. In ED, patient underwent work-up including CT head without contrast which was negative. CTA shoiwed severe stenosis of left vertebral artery. Troponin level was negative. EKG showed no acute ST-T wave changes. Labs were notable for pancytopenia. His last labs in 2020 where within normal range. Hematology attending was consulted from ED. Patient was scheduled to receive 1 PRBC transfusion for hemoglobin level of 7.1. Hospitalist service called for admitting the patient for further evaluation and management of TIA and pancytopenia. Chest x-ray:  COPD with no acute finding in the chest.    CT of Head:  1. No acute intracranial abnormality. \"SLP Eval and Treat\" orders were received this date. Pt reported episode of confusion yesterday however reported this has largely resolved. Pt reported no speech-language deficits and was able to provide history with no overt difficulty assessed. Pt's son reported a few instances of Pt requiring extra time to come up with words. Will complete full speech-language evaluation as indicated. Treatment Diagnosis: Minimal Oropharyngeal Dysphagia    Impressions: Pt was seen sitting upright in bed, he reported no difficulty with swallowing.   Oral motor examination was largely within functional limits. Various textures were provided to assess swallow function. Pt presents with minimal oropharyngeal dysphagia characterized by minimally prolonged mastication and minimally delayed swallow initiation. Pt with no overt clinical s/s of aspiration/penetration across textures. Pt demonstrated adequate oral clearance with regular solids. Recommend continuation of regular texture diet with thin liquids at this time. Dietary Recommendations: Regular texture diet with thin liquids, meds as tolerated     Strategies: 90 degree positioning with all p.o. intake    Treatment/Goals: Speech therapy for dysphagia tx x1 to ensure diet tolerance and to further assess speech/language abilities as needed. ST.) Pt will tolerate recommended diet without s/s of aspiration     Oral motor Exam:  Dentition: adequate  Labial/Facial: within functional limits   Lingual: within functional limits   Voice: within functional limits     Oral Phase:   Minimally prolonged mastication    Pharyngeal Phase:  Minimally delayed swallow initiation    Patient/Family Education:Education, results and recommendations given to the Pt and nurse, who verbalized understanding    Timed Code Treatment: 0 minutes    Total Treatment Time: 23 minutes    Discharge Recommendations: No suspected need for Speech/Dysphagia treatment at discharge. If patient discharges prior to next session this note will serve as a discharge summary.       Esperanza Foreman M.A., [address] [place] [address]  Speech-Language Pathologist

## 2020-07-07 NOTE — PROGRESS NOTES
Dr. Sonia Forbes called for update on patient. Informed implanted device is not compatible with MRI and patient will be going soon for doppler studies.  No new orders at present Ligia Huddleston

## 2020-07-07 NOTE — PROGRESS NOTES
Awake in bed, reassessed and NIHSS completed see flow sheet. Son bedside. Saline lock intact, site unremarkable. Tele on NSR. Denies any needs or pain at present. Questions answered regarding plan of care, role of doctors and plan from here. Taking po well, tolerating well. Voiding without difficulty. Call light in reach, side rails up. Is up as tolerated.  Will continue to monitor Vickey Jessica

## 2020-07-07 NOTE — PROGRESS NOTES
Awake in bed assessment and NIHSS completed see flow sheet. VS stable. Saline lock intact, site unremarkable. Voiding without difficulty, up to BR as needed. Taking po tolerated well. Denies any vision, dizziness or problems with tasks, Denies pain or needs at present. On tele NSR. Call light in reach, side rails up.  Will continue to monitor Manuel Cage

## 2020-07-07 NOTE — CONSULTS
In patient Neurology consult        [facility] Neurology      MD Rianna Yeagerama Quispeter  1940    Date of Service: 7/7/2020    Referring Physician: Nay Ayers MD      Reason for the consult and CC: Acute encephalopathy and chronic cognitive impairment. HPI:   The patient is a 79y.o.  years old male with history of CAD, hypertension, status post a defibrillator and pacemaker placement, and MDS admitted to the hospital yesterday with acute encephalopathy and visual changes. According to the patient, has been having at least 2 years of intermittent visual change from both eyes. Degree is mild to moderate and frequency is sporadic. Duration is minutes. He describes it as \"distorted vision\". No clear triggers or other associated symptoms. No headache, weakness or numbness visual claudication. The patient also described chronic recurrent confusion and forgetfulness at home. He was with his son yesterday few hours prior to admission. He dropped his son for a medical visit and then when his son comes back an hour later, the patient was confused and cannot remember recent events from the same day. He was able to drive back home and his son drove him home. No witnessed seizure or fever or falling or head trauma. His wife and family were concerned about his forgetfulness and decided to bring him to the ER for evaluation. Initial CT of the head showed no acute stroke. CTA showed left vertebral stenosis. Initial blood testing revealed pancytopenia and anemia. He was admitted for medical management. Today he is awake and alert. He feels back to his baseline. Denies any headache, chest pain, visual changes, neck or back pain or weakness or numbness. He denies recent change in medications. Recent blood test showed normal TSH. Unremarkable CMP. White count from yesterday was 3.3, hemoglobin of 7 and platelets of 798. He has been seen by hematology.   He has a pacemaker and he feels it is not compatible with MRI. Other review of system was unremarkable.       Family History   Problem Relation Age of Onset    Cancer Mother     Alzheimer's Disease Father     Cancer Brother         melanoma    Cancer Brother     Heart Disease Neg Hx     High Blood Pressure Neg Hx     High Cholesterol Neg Hx      Past Surgical History:   Procedure Laterality Date    CARDIAC DEFIBRILLATOR PLACEMENT  10/10/12    dual chamber ICD [employer]    CATARACT REMOVAL WITH IMPLANT  2018    CHEST TUBE INSERTION      OTHER SURGICAL HISTORY  10/2012    insertion of cardiac recorder    SPLENECTOMY          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    Dizziness 2012    Elevated prostate specific antigen (PSA) 2011    Essential hypertension, benign 2011    Mononucleosis age 49    caused spleen to rupture    Pneumothorax     Pure hypercholesterolemia 2011    Spinal stenosis of lumbar region without neurogenic claudication 10/31/2017    Mild to moderate L4-5    Ventricular tachycardia ([address] 10/2012     Social History     Tobacco Use    Smoking status: Former Smoker     Packs/day: 1.50     Years: 35.00     Pack years: 52.50     Start date: 1958     Last attempt to quit: 1991     Years since quittin.5    Smokeless tobacco: Never Used   Substance Use Topics    Alcohol use: No    Drug use: No     No Known Allergies  Current Facility-Administered Medications   Medication Dose Route Frequency Provider Last Rate Last Dose    aspirin EC tablet 81 mg  81 mg Oral Daily Rony Marshall MD   81 mg at 20 0851    metoprolol tartrate (LOPRESSOR) tablet 12.5 mg  12.5 mg Oral BID Rony Marshall MD   12.5 mg at 20 0851    sodium chloride flush 0.9 % injection 10 mL  10 mL Intravenous 2 times per day Carmen MD Raymond   10 mL at 07/06/20 2044    sodium chloride flush 0.9 % injection 10 mL  10 mL Intravenous PRN Inez Burroughs MD        polyethylene glycol (GLYCOLAX) packet 17 g  17 g Oral Daily PRN Inez Burroughs MD        promethazine (PHENERGAN) tablet 12.5 mg  12.5 mg Oral Q6H PRN Inez Burroughs MD        enoxaparin (LOVENOX) injection 40 mg  40 mg Subcutaneous Daily Inez Burroughs MD   40 mg at 07/07/20 0854    perflutren lipid microspheres (DEFINITY) injection 1.65 mg  1.5 mL Intravenous ONCE PRN Inez Burroughs MD        rosuvastatin (CRESTOR) tablet 40 mg  40 mg Oral Nightly Inez Burroughs MD   40 mg at 07/06/20 2044    sotalol (BETAPACE) tablet 80 mg  80 mg Oral BID Inez Burroughs MD   80 mg at 07/07/20 0851    losartan (COZAAR) tablet 100 mg  100 mg Oral Daily Inez Burroughs MD   100 mg at 07/07/20 0851       ROS : A 10-14 system review of constitutional, cardiovascular, respiratory, eyes, musculoskeletal, endocrine, GI, ENT, skin, hematological, genitourinary, psychiatric and neurologic systems was obtained and updated today and is unremarkable except as mentioned in my HPI      Exam:     Constitutional:   Vitals:    07/06/20 2300 07/07/20 0330 07/07/20 0334 07/07/20 0851   BP: (!) 99/54  (!) 103/51 (!) 162/68   Pulse: 73  68 76   Resp: 16  18    Temp: 98.1 °F (36.7 °C)  98.2 °F (36.8 °C)    TempSrc: Temporal  Temporal    SpO2: 96%  97%    Weight:  159 lb 6.3 oz (72.3 kg)     Height:           General appearance:  Normal development and appear in no acute distress. Eye: No icterus. Fundus: Funduscopic examination cannot be performed due to COVID19 restrictions and precautions. Neck: supple  Cardiovascular: No lower leg edema with good pulsation. Mental Status:   Oriented to person, place, problem, and time. Memory:  Good immediate recall. Intact remote memory  Normal attention span and concentration. Language: intact naming, repeating and fluency   Good fund of Knowledge.  Aware of current events and vocabulary   Cranial Nerves:   II: Visual fields: Full. Pupils: equal, round, reactive to light  III,IV,VI: Extra Ocular Movements are intact. No nystagmus  V: Facial sensation is intact   VII: Facial strength and movements: intact and symmetric  VIII: Hearing: Intact to finger rub bilaterally  IX: Palate elevation is symmetric  XI: Shoulder shrug is intact  XII: Tongue movements are normal  Musculoskeletal: 5/5 in all 4 extremities. Tone: Normal tone. Reflexes: Bilateral biceps 2/4, triceps 2/4, brachial radialis 2/4, knee 2/4 and ankle 2/4. Planters: flexor bilaterally. Coordination: no pronator drift, no dysmetria with FNF in upper extremities. Normal REM. Sensation: normal to all modalities in both arms and legs. Gait/Posture: steady gait and normal posturing and station. Data:  LABS:   Lab Results   Component Value Date     07/06/2020    K 3.8 07/06/2020     07/06/2020    CO2 26 07/06/2020    BUN 19 07/06/2020    CREATININE 1.0 07/06/2020    GFRAA >60 07/06/2020    GFRAA >60 10/10/2012    LABGLOM >60 07/06/2020    GLUCOSE 122 07/06/2020    GLUCOSE 109 03/17/2012    PHOS 2.0 09/28/2012    MG 1.5 06/26/2014    CALCIUM 8.8 07/06/2020     Lab Results   Component Value Date    WBC 4.4 07/07/2020    RBC 1.95 07/07/2020    HGB 8.2 07/07/2020    HCT 23.4 07/07/2020    .2 07/07/2020    RDW 30.1 07/07/2020     07/07/2020     Lab Results   Component Value Date    INR 0.99 07/06/2020    PROTIME 11.5 07/06/2020       Neuroimaging were independently reviewed by myself and discussed results with the patient  Reviewed notes from different physicians  Reviewed lab and blood testing    Impression:  Acute on chronic encephalopathy. Some of his acute delirium could be caused by pancytopenia and anemia. Less likely new ischemic stroke. ? Chronic cognitive impairment with recent worsening likely underlying dementia.   Will need further work-up outpatient for cognitive evaluation and more collateral information and history taken from his family. Pancytopenia  Hypertension  Hyperlipidemia  CAD  Status post pacemaker and defibrillator  MDS    Recommendation:     Continue current supportive care  Can consider MRI if pacemaker is compared with MRI. Aspirin and follow CBC  Continue current blood pressure medications  Statin  PT and OT  Telemetry  DVT and GI prophylaxis if okay with hematology  Check B12 and folate  Blood pressure monitor  Discussed driving precaution with the patient  Follow-up ophthalmology outpatient  Can be discharged when medically stable  Follow-up with neurology in 2 to 3 weeks with family to assess for chronic cognitive impairment  We will follow PRN. Thank you for referring such patient. If you have any questions regarding my consult note, please don't hesitate to call me. Anshul Zepeda MD  [phone]    This dictation was generated by voice recognition computer software.  Although all attempts are made to edit the dictation for accuracy, there may be errors in the  transcription that are not intended

## 2020-07-07 NOTE — PLAN OF CARE
Problem: Falls - Risk of:  Goal: Will remain free from falls  Description: Will remain free from falls  Outcome: Ongoing     Problem: Pain:  Goal: Control of acute pain  Description: Control of acute pain  Outcome: Ongoing

## 2020-07-07 NOTE — CARE COORDINATION
Patient admitted as Observation/OPIB with an anticipated short hospitalization length of stay. Chart reviewed and it appears that patient has minimal needs for discharge at this time. Discussed with patients nurse and requested that case management be notified if discharge needs are identified. Case management will continue to follow progress and update discharge plan as needed.     Emilia Salinas, BSN, RN  [phone]

## 2020-07-07 NOTE — PROGRESS NOTES
Occupational Therapy  Eric Holbrook      Received order for OT evaluation. Pt reported independent with ADLs, ambulation. Per PT report, pt donned hospital pants independently and was independent with ambulation. Pt reported that his only issue right now is some blurring of vision, which he is not having currently, other than seeing some floaters. Pt able to accurately read board and time on clock. This OT recommended that pt have an eye exam to address his visual issues, and pt agreeable. Son present in room. Will discharge OT order at this time, as pt independent. Thank you.  Robert Dale, OTR/L, [id number]

## 2020-07-07 NOTE — PLAN OF CARE
Problem: Falls - Risk of:  Goal: Will remain free from falls  Description: Will remain free from falls  7/7/2020 1254 by Mary Cardoso RN  Outcome: Ongoing  7/6/2020 2329 by Lalo Alejandra RN  Outcome: Ongoing     Problem: Pain:  Goal: Control of acute pain  Description: Control of acute pain  7/6/2020 2329 by Lalo Alejandra RN  Outcome: Ongoing     Problem: Safety:  Goal: Free from accidental physical injury  Description: Free from accidental physical injury  Outcome: Ongoing  Goal: Free from intentional harm  Description: Free from intentional harm  Outcome: Ongoing     Problem: Daily Care:  Goal: Daily care needs are met  Description: Daily care needs are met  Outcome: Ongoing

## 2020-07-07 NOTE — PROGRESS NOTES
[facility]   Progress Note      7/7/2020 10:56 AM        Name: Radhames Catalan . Admitted: 7/6/2020    SUBJECTIVE:  He reports feeling well, denies headache, dizziness. He does have some vision changes, floaters, he states that this is not new. Reviewed interval ancillary notes    Current Medications  aspirin EC tablet 81 mg, Daily  metoprolol tartrate (LOPRESSOR) tablet 12.5 mg, BID  sodium chloride flush 0.9 % injection 10 mL, 2 times per day  sodium chloride flush 0.9 % injection 10 mL, PRN  polyethylene glycol (GLYCOLAX) packet 17 g, Daily PRN  promethazine (PHENERGAN) tablet 12.5 mg, Q6H PRN  enoxaparin (LOVENOX) injection 40 mg, Daily  perflutren lipid microspheres (DEFINITY) injection 1.65 mg, ONCE PRN  rosuvastatin (CRESTOR) tablet 40 mg, Nightly  sotalol (BETAPACE) tablet 80 mg, BID  losartan (COZAAR) tablet 100 mg, Daily        Objective:  BP (!) 162/68   Pulse 76   Temp 98.2 °F (36.8 °C) (Temporal)   Resp 18   Ht 5' 8\" (1.727 m)   Wt 159 lb 6.3 oz (72.3 kg)   SpO2 97%   BMI 24.24 kg/m²     Intake/Output Summary (Last 24 hours) at 7/7/2020 1056  Last data filed at 7/7/2020 0349  Gross per 24 hour   Intake 546.99 ml   Output 1075 ml   Net -528.01 ml      Wt Readings from Last 3 Encounters:   07/07/20 159 lb 6.3 oz (72.3 kg)   12/05/19 151 lb 6.4 oz (68.7 kg)   10/28/19 157 lb 6.4 oz (71.4 kg)       General appearance:  Appears comfortable  Eyes: Sclera clear. Pupils equal.  ENT: Moist oral mucosa. Trachea midline, no adenopathy. Cardiovascular: Regular rhythm, normal S1, S2. No murmur. No edema in lower extremities  Respiratory: Not using accessory muscles. Good inspiratory effort. Clear to auscultation bilaterally, no wheeze or crackles. GI: Abdomen soft, no tenderness, not distended  Musculoskeletal: No cyanosis in digits, neck supple  Neurology: CN 2-12 grossly intact. No speech or motor deficits  Psych: Normal affect.  Alert and oriented in time, place and person  Skin: Warm, dry, normal turgor    Labs and Tests:  CBC:   Recent Labs     07/06/20  1319 07/07/20  0345   WBC 3.3* 4.4   HGB 7.1* 8.2*   * 137     BMP:    Recent Labs     07/06/20  1319      K 3.8      CO2 26   BUN 19   CREATININE 1.0   GLUCOSE 122*     Hepatic:   Recent Labs     07/06/20  1319   AST 13*   ALT 13   BILITOT 0.7   ALKPHOS 68       ASSESSMENT AND PLAN    Active Problems:    TIA (transient ischemic attack)  Resolved Problems:    * No resolved hospital problems. *      1. Pancytopenia     - Likely secondary to Hydrea. - No iron deficiency,  B12 and folate pending  - No hemolysis. - Peripheral smear to be reviewed. - Transfuse PRBC if hgb less than 7.  - hgb improving, WBC and plts have returned to wnl        2.  Essential thrombocytosis     -On Hydrea 1,500 mg daily, currently on hold d/t pancytopenia    Va Scott CNP  [facility]  Patient anxious to go home will continue to hold hydrea instructed to followup with dr Micky Tirado

## 2020-07-07 NOTE — PROGRESS NOTES
Stable this shift. No dizziness or vision changes this shift. NIHSS \"0\" this shift. Up ambulating in room. Taking po well, tolerating well. Voiding without difficulty . Had ECHO and vascular carotid study today. Family bedside with patient today.  Manuelito Luong

## 2020-07-07 NOTE — PROGRESS NOTES
Physical Therapy    Facility/Department: [facility]  Initial Assessment/Discharge Summary    NAME: Paz Grewal  : 1940  MRN: [id number]    Date of Service: 2020    Discharge Recommendations:  Paz Grewal scored a 24/24 on the AM-PAC short mobility form. Current research shows that an AM-PAC score of 18 or greater is typically associated with a discharge to the patient's home setting. Based on the patient's AM-PAC score and their current functional mobility deficits, it is recommended that the patient have 2-3 sessions per week of Physical Therapy at d/c to increase the patient's independence. At this time, this patient demonstrates the endurance and safety to discharge home with OP and a follow up treatment frequency of 2-3x/wk. Please see assessment section for further patient specific details. If patient discharges prior to next session this note will serve as a discharge summary. Please see below for the latest assessment towards goals. Outpatient PT   PT Equipment Recommendations  Equipment Needed: No    Assessment   Assessment: Safe and independent with functional mobility. States increased B knee pain and fatigue despite active lifestyle. Would benefit from out-patient PT to addrss  Decision Making: Low Complexity  Clinical Presentation: stable  PT Education: Goals;PT Role  Patient Education: d/c recommendations - verbalized understanding  Barriers to Learning: none  REQUIRES PT FOLLOW UP: No  Activity Tolerance  Activity Tolerance: Patient Tolerated treatment well       Patient Diagnosis(es): The primary encounter diagnosis was TIA (transient ischemic attack). Diagnoses of [facility] and Stenosis of left vertebral artery were also pertinent to this visit.      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 has a past surgical history that includes Splenectomy; chest tube insertion; other surgical history (10/2012); Cardiac defibrillator placement (10/10/12); and Cataract removal with implant (08/2018). Restrictions  Restrictions/Precautions  Restrictions/Precautions: (low fall risk)  Required Braces or Orthoses?: No  Position Activity Restriction  Other position/activity restrictions: Particpeña Menon is a 79 y.o. male who presents for evaluation of episodes of blurred vision that is been going on for the past several months. Patient states that it would occur where he would get blurred vision this would last for approximately 20 minutes and go away on its own or after taking full-strength aspirin. Patient states these episodes seem to be getting more frequent and now he is having developing confusion and agitation. Symptoms seem to be worse over the past 2 weeks per wife. Apparently he had 1 of his worst episodes yet this morning about 2 or 3 hours prior to arrival in the ED. Vision/Hearing  Vision: Impaired  Vision Exceptions: Wears glasses at all times  Hearing: Exceptions to [facility]  Hearing Exceptions: Hard of hearing/hearing concerns;Bilateral hearing aid     Subjective  General  Chart Reviewed: Yes  Family / Caregiver Present: No  Diagnosis: TIA  Follows Commands: Within Functional Limits  General Comment  Comments: supine in bed upon arrival   Subjective  Subjective: Denied pain at rest. Agreeable to therapy. Reports chronic fatigue. States increased B knee pain with activity and states he feels he never fully recovered from having the flu earlier this year.    Pain Screening  Patient Currently in Pain: Denies          Orientation  Orientation  Overall Orientation Status: Within Functional Limits  Social/Functional History  Social/Functional History  Lives With: Spouse  Type of Home: House  Home Layout: Two level, Bed/Bath upstairs  Bathroom Shower/Tub: Walk-in shower  Bathroom Toilet: Standard  Home Equipment: Rolling walker, Cane  ADL Assistance: Independent  Homemaking Assistance: Independent  Ambulation Assistance: Independent  Transfer Assistance: Independent  Active : Yes  Mode of Transportation: Car  Additional Comments: denied recent falls    Objective          AROM RLE (degrees)  RLE AROM: WFL  AROM LLE (degrees)  LLE AROM : WFL  Strength RLE  Strength RLE: WFL  Strength LLE  Strength LLE: WFL     Sensation  Overall Sensation Status: WFL  Bed mobility  Supine to Sit: Independent  Scooting: Independent  Transfers  Sit to Stand: Independent  Stand to sit: Independent  Ambulation 1  Surface: level tile  Device: No Device  Assistance: Independent  Quality of Gait: steady gait, no deviations  Distance: 270 feet  Stairs  # Steps : 3  Rails: Left ascending  Assistance: Modified independent   Comment: steady reciprocal pattern     Balance  Sitting - Static: Good  Sitting - Dynamic: Good  Standing - Static: Good  Standing - Dynamic: Good        Plan   Safety Devices  Type of devices:  All fall risk precautions in place, Call light within reach, Left in bed  Restraints  Initially in place: No      AM-PAC Score  AM-PAC Inpatient Mobility Raw Score : 24 (07/07/20 1151)  AM-PAC Inpatient T-Scale Score : 61.14 (07/07/20 1151)  Mobility Inpatient CMS 0-100% Score: 0 (07/07/20 1151)  Mobility Inpatient CMS G-Code Modifier : [address] (07/07/20 1151)          Goals  Short term goals  Time Frame for Short term goals: No acute PT goals       Therapy Time   Individual Concurrent Group Co-treatment   Time In 1131         Time Out 1140         Minutes 9         Timed Code Treatment Minutes: 0 Minutes       Karis Aguirre, PT   Thanks, Karis Aguirre, PT, DPT [id number]

## 2020-07-07 NOTE — PROGRESS NOTES
Reassessed see flow sheet. VS stable. Wife bedside. Voiding per urinal. Taking po well. Denies dizziness or any visual concerns this shift. Tele on NSR. Denies needs or pain at present. Call light in reach.  Will continue to monitor Elizabeth Figueroa

## 2020-07-07 NOTE — PROGRESS NOTES
Patient does not have history of swallowing problems, not on a modified diet prior to hospitalization, no drooling, asymmetry, lethargy, no trach, and GSC not <13. Patient drank 3 oz. water without stopping with head of bed between 75 and 90 degrees. RN observed for signs of aspiration: choking;coughing;\"wet\"/gurgly voice with speech;delayed or absent swallow, increased SOB folowing screen or drop in 02 saturation, changes in lung sounds/wheezing;loss of liquid from mouth;inability to drink entire 3 oz. without stopping. Patient passed nursing swallow screen. May have diet with thin liquids and meds with water.

## 2020-07-08 VITALS
TEMPERATURE: 97.5 F | RESPIRATION RATE: 20 BRPM | HEIGHT: 68 IN | WEIGHT: 159.17 LBS | DIASTOLIC BLOOD PRESSURE: 66 MMHG | OXYGEN SATURATION: 96 % | BODY MASS INDEX: 24.12 KG/M2 | SYSTOLIC BLOOD PRESSURE: 126 MMHG | HEART RATE: 67 BPM

## 2020-07-08 LAB
ANION GAP SERPL CALCULATED.3IONS-SCNC: 10 MMOL/L (ref 3–16)
ANISOCYTOSIS: ABNORMAL
BANDED NEUTROPHILS RELATIVE PERCENT: 2 % (ref 0–7)
BASOPHILS ABSOLUTE: 0 K/UL (ref 0–0.2)
BASOPHILS RELATIVE PERCENT: 0 %
BUN BLDV-MCNC: 18 MG/DL (ref 7–20)
BURR CELLS: ABNORMAL
CALCIUM SERPL-MCNC: 8.9 MG/DL (ref 8.3–10.6)
CHLORIDE BLD-SCNC: 105 MMOL/L (ref 99–110)
CO2: 25 MMOL/L (ref 21–32)
CREAT SERPL-MCNC: 0.8 MG/DL (ref 0.8–1.3)
EOSINOPHILS ABSOLUTE: 0 K/UL (ref 0–0.6)
EOSINOPHILS RELATIVE PERCENT: 0 %
ESTIMATED AVERAGE GLUCOSE: 116.9 MG/DL
GFR AFRICAN AMERICAN: >60
GFR NON-AFRICAN AMERICAN: >60
GLUCOSE BLD-MCNC: 123 MG/DL (ref 70–99)
HBA1C MFR BLD: 5.7 %
HCT VFR BLD CALC: 23.8 % (ref 40.5–52.5)
HEMATOLOGY PATH CONSULT: NO
HEMOGLOBIN: 8.5 G/DL (ref 13.5–17.5)
LYMPHOCYTES ABSOLUTE: 2.4 K/UL (ref 1–5.1)
LYMPHOCYTES RELATIVE PERCENT: 60 %
MACROCYTES: ABNORMAL
MCH RBC QN AUTO: 43.9 PG (ref 26–34)
MCHC RBC AUTO-ENTMCNC: 35.5 G/DL (ref 31–36)
MCV RBC AUTO: 123.5 FL (ref 80–100)
MONOCYTES ABSOLUTE: 0.3 K/UL (ref 0–1.3)
MONOCYTES RELATIVE PERCENT: 8 %
NEUTROPHILS ABSOLUTE: 1.3 K/UL (ref 1.7–7.7)
NEUTROPHILS RELATIVE PERCENT: 30 %
OVALOCYTES: ABNORMAL
PDW BLD-RTO: 30.8 % (ref 12.4–15.4)
PLATELET # BLD: 144 K/UL (ref 135–450)
PLATELET SLIDE REVIEW: ADEQUATE
PMV BLD AUTO: 8.8 FL (ref 5–10.5)
POIKILOCYTES: ABNORMAL
POLYCHROMASIA: ABNORMAL
POTASSIUM REFLEX MAGNESIUM: 3.8 MMOL/L (ref 3.5–5.1)
RBC # BLD: 1.93 M/UL (ref 4.2–5.9)
SCHISTOCYTES: ABNORMAL
SLIDE REVIEW: ABNORMAL
SODIUM BLD-SCNC: 140 MMOL/L (ref 136–145)
TEAR DROP CELLS: ABNORMAL
WBC # BLD: 4 K/UL (ref 4–11)

## 2020-07-08 PROCEDURE — 80048 BASIC METABOLIC PNL TOTAL CA: CPT

## 2020-07-08 PROCEDURE — 6360000002 HC RX W HCPCS: Performed by: INTERNAL MEDICINE

## 2020-07-08 PROCEDURE — G0378 HOSPITAL OBSERVATION PER HR: HCPCS

## 2020-07-08 PROCEDURE — 2580000003 HC RX 258: Performed by: INTERNAL MEDICINE

## 2020-07-08 PROCEDURE — 96372 THER/PROPH/DIAG INJ SC/IM: CPT

## 2020-07-08 PROCEDURE — 6370000000 HC RX 637 (ALT 250 FOR IP): Performed by: INTERNAL MEDICINE

## 2020-07-08 PROCEDURE — 85025 COMPLETE CBC W/AUTO DIFF WBC: CPT

## 2020-07-08 RX ORDER — LOSARTAN POTASSIUM 100 MG/1
100 TABLET ORAL DAILY
Qty: 30 TABLET | Refills: 3 | Status: SHIPPED | OUTPATIENT
Start: 2020-07-09 | End: 2020-07-13 | Stop reason: SDUPTHER

## 2020-07-08 RX ADMIN — ENOXAPARIN SODIUM 40 MG: 40 INJECTION SUBCUTANEOUS at 08:09

## 2020-07-08 RX ADMIN — LOSARTAN POTASSIUM 100 MG: 100 TABLET, FILM COATED ORAL at 08:09

## 2020-07-08 RX ADMIN — METOPROLOL TARTRATE 12.5 MG: 25 TABLET, FILM COATED ORAL at 08:09

## 2020-07-08 RX ADMIN — Medication 10 ML: at 08:15

## 2020-07-08 RX ADMIN — ASPIRIN 81 MG: 81 TABLET, COATED ORAL at 08:09

## 2020-07-08 RX ADMIN — SOTALOL HYDROCHLORIDE 80 MG: 80 TABLET ORAL at 08:09

## 2020-07-08 ASSESSMENT — PAIN SCALES - GENERAL
PAINLEVEL_OUTOF10: 0

## 2020-07-08 NOTE — PROGRESS NOTES
[facility]   Progress Note      7/8/2020 8:59 AM        Name: Rod Will . Admitted: 7/6/2020    SUBJECTIVE:  Patient doing okay. Anxious to go home. Reviewed interval ancillary notes    Current Medications  aspirin EC tablet 81 mg, Daily  metoprolol tartrate (LOPRESSOR) tablet 12.5 mg, BID  sodium chloride flush 0.9 % injection 10 mL, 2 times per day  sodium chloride flush 0.9 % injection 10 mL, PRN  polyethylene glycol (GLYCOLAX) packet 17 g, Daily PRN  promethazine (PHENERGAN) tablet 12.5 mg, Q6H PRN  enoxaparin (LOVENOX) injection 40 mg, Daily  perflutren lipid microspheres (DEFINITY) injection 1.65 mg, ONCE PRN  rosuvastatin (CRESTOR) tablet 40 mg, Nightly  sotalol (BETAPACE) tablet 80 mg, BID  losartan (COZAAR) tablet 100 mg, Daily        Objective:  /66   Pulse 67   Temp 97.6 °F (36.4 °C) (Temporal)   Resp 18   Ht 5' 8\" (1.727 m)   Wt 159 lb 2.8 oz (72.2 kg)   SpO2 94%   BMI 24.20 kg/m²     Intake/Output Summary (Last 24 hours) at 7/8/2020 0859  Last data filed at 7/8/2020 0514  Gross per 24 hour   Intake 480 ml   Output 1550 ml   Net -1070 ml      Wt Readings from Last 3 Encounters:   07/08/20 159 lb 2.8 oz (72.2 kg)   12/05/19 151 lb 6.4 oz (68.7 kg)   10/28/19 157 lb 6.4 oz (71.4 kg)       General appearance: Appears comfortable. Well nourished  Eyes: Sclera clear, pupils equal  ENT: Moist mucus membranes, no thrush  Neck: Trachea midline, symmetrical  Cardiovascular: Regular rhythm, normal S1, S2. No murmur, gallop, rub. No edema in  lower extremities  Respiratory: Clear to auscultation bilaterally. No wheeze. Good inspiratory effort  Gastrointestinal: Abdomen soft, not tender, not distended, normal bowel sounds  Musculoskeletal: No cyanosis in digits, warm extremities  Neurologic: Cranial nerves grossly intact, no motor or speech deficits. Psychiatric: Normal affect. Alert and oriented to time, place and person.   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 *      1. Pancytopenia     - Secondary to Hydrea. - No iron, B12 or folate deficiency. No hemolysis. - Hgb improving, WBC and plts have returned to WNL.       2. Essential thrombocytosis     -On Hydrea 1,500 mg daily, currently on hold d/t pancytopenia      Dispo: Okay for discharge from heme/onc perspective when medically stable. Continue to hold Hydrea. Follow up with Dr. Everett Wood after discharge. Morgan Hernandez, [place]  [facility]  [phone]    Patient was seen and examined. Agree with above. CBC showed white count of 4, hemoglobin 8.5 and platelet count 369-GCG parameters better. We will continue to hold Hydrea.     Carlos Gaston MD

## 2020-07-08 NOTE — PROGRESS NOTES
Tere to Dr. Anil Mac regarding discharge order and patient becoming agitated and wanting to go home.   Needing discharge order [facility]

## 2020-07-08 NOTE — PLAN OF CARE
Problem: Falls - Risk of:  Goal: Will remain free from falls  Description: Will remain free from falls  7/7/2020 2325 by Bita Ovalles RN  Outcome: Ongoing     Problem: Pain:  Goal: Control of acute pain  Description: Control of acute pain  Outcome: Ongoing

## 2020-07-08 NOTE — PROGRESS NOTES
Patient discharged to private car with wife per wheelchair in stable condition per hospital personnel.  Time permitted for questions, questions answered Sang Solis

## 2020-07-08 NOTE — FLOWSHEET NOTE
Patient is awake alert and oriented. Patient is voicing that he is ready to go home. Wife at bedside. Reassesment of NIHHS stroke scale remains within normal parameters.

## 2020-07-08 NOTE — DISCHARGE SUMMARY
[facility]    Patient Demographics    PatientSony Broussard  Date of Birth. 1940  MRN. [id number]     Primary care provider. Christopher Garay MD  (Tel: [phone])    Admit date: 7/6/2020    Discharge date (blank if same as Note Date): Note Date: 7/8/2020     Reason for Hospitalization. Chief Complaint   Patient presents with    Altered Mental Status     pt c/o confusion and intermittent visual changes for several months. PCP ordered CT for tomorrow but this AM started feeling \"fuzzy. \" A&O x4 and - NIH by this RN in [facility]. Significant Findings. Active Problems:    HTN (hypertension), benign    TIA (transient ischemic attack)    Dementia due to Alzheimer's disease ([address]    Dyslipidemia    Pancytopenia ([facility] [address]  Resolved Problems:    Acute encephalopathy       Problems and results from this hospitalization that need follow up. 1. Pancytopenia  2. Thrombocytosis    Significant test results and incidental finding  Vascular carotid duplex bilateral         CTA HEAD NECK W CONTRAST   Final Result   No large vessel occlusion visualized within the head or neck. 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 50% stenosis of both proximal cervical ICAs. CT Head WO Contrast   Final Result   1. No acute intracranial abnormality. XR CHEST STANDARD (2 VW)   Final Result   COPD with no acute finding in the chest.           Invasive procedures and treatments. None     [facility] Course. Ramses Crandall a 79 y. o. male who presented with past medical history significant for CAD, hypertension, V. Tach, s/p defibrillator placement, carotid artery occlusion, thrombocytosis who presented to ED with chronic recurrent episodes of confusion, agitation and blurred vision.  As per patient and his wife, he had multiple episodes over the last several months. CTA  showed severe stenosis of left vertebral artery. Brain MRI and 2d-echo showed no abnormalities. Neurology recommended aspirin and statin therapy. [place] need to f/u Neurology in 2-4 weeks.    Labs were notable for pancytopenia.  His last labs in 4/2020 were within normal range.  Hematology attending was consulted from ED. Shirley Wheeler was scheduled to receive 1 PRBC transfusion for hemoglobin level of 7.1. Cause of pancytopenia was Hydroxyurea use. Cell counts improved after Hydroxyurea was held on admission. Pt was asked to f/u with Dr. Bertha Angel as outpt, for further mx of thrombocytosis. Consults. IP CONSULT TO HEM/ONC  IP CONSULT TO HOSPITALIST  IP CONSULT TO NEUROLOGY    Physical examination on discharge day. /66   Pulse 67   Temp 97.5 °F (36.4 °C) (Temporal)   Resp 20   Ht 5' 8\" (1.727 m)   Wt 159 lb 2.8 oz (72.2 kg)   SpO2 96%   BMI 24.20 kg/m²   General appearance. Alert. Looks comfortable. HEENT. Sclera clear. Moist mucus membranes. Cardiovascular. Regular rate and rhythm, normal S1, S2. No murmur. Respiratory. Not using accessory muscles. Clear to auscultation bilaterally, no wheeze. Gastrointestinal. Abdomen soft, non-tender, not distended, normal bowel sounds  Neurology. Facial symmetry. No speech deficits. Moving all extremities equally. Extremities. No edema in lower extremities. Skin. Warm, dry, normal turgor    Condition at time of discharge stable    Medication instructions provided to patient at discharge. Medication List      START taking these medications    losartan 100 MG tablet  Commonly known as:  COZAAR  Take 1 tablet by mouth daily  Start taking on:  July 9, 2020        CHANGE how you take these medications    magnesium oxide 400 MG tablet  Commonly known as:  MAG-OX  Take 1 tablet by mouth daily.   What changed:  how much to take        CONTINUE taking these medications    aspirin 81 MG EC tablet     metoprolol tartrate 25 MG tablet  Commonly known as:  LOPRESSOR  Take 0.5 tablets by mouth 2 times daily  Notes to

## 2020-07-13 ENCOUNTER — OFFICE VISIT (OUTPATIENT)
Dept: FAMILY MEDICINE CLINIC | Age: 80
End: 2020-07-13
Payer: MEDICARE

## 2020-07-13 VITALS
RESPIRATION RATE: 12 BRPM | WEIGHT: 157.4 LBS | SYSTOLIC BLOOD PRESSURE: 127 MMHG | TEMPERATURE: 97.9 F | HEART RATE: 64 BPM | BODY MASS INDEX: 23.93 KG/M2 | OXYGEN SATURATION: 95 % | DIASTOLIC BLOOD PRESSURE: 54 MMHG

## 2020-07-13 PROCEDURE — 99215 OFFICE O/P EST HI 40 MIN: CPT | Performed by: FAMILY MEDICINE

## 2020-07-13 PROCEDURE — 1111F DSCHRG MED/CURRENT MED MERGE: CPT | Performed by: FAMILY MEDICINE

## 2020-07-13 RX ORDER — LOSARTAN POTASSIUM 100 MG/1
100 TABLET ORAL DAILY
Qty: 90 TABLET | Refills: 1 | Status: SHIPPED | OUTPATIENT
Start: 2020-07-13 | End: 2020-11-25

## 2020-07-13 RX ORDER — SIMVASTATIN 20 MG
TABLET ORAL
Qty: 90 TABLET | Refills: 1 | Status: SHIPPED | OUTPATIENT
Start: 2020-07-13 | End: 2020-11-25

## 2020-07-13 ASSESSMENT — PATIENT HEALTH QUESTIONNAIRE - PHQ9
2. FEELING DOWN, DEPRESSED OR HOPELESS: 0
1. LITTLE INTEREST OR PLEASURE IN DOING THINGS: 0
SUM OF ALL RESPONSES TO PHQ9 QUESTIONS 1 & 2: 0
SUM OF ALL RESPONSES TO PHQ QUESTIONS 1-9: 0
SUM OF ALL RESPONSES TO PHQ QUESTIONS 1-9: 0

## 2020-07-13 NOTE — PROGRESS NOTES
Post-Discharge Transitional Care Management Services or Hospital Follow Up      Javier Pittman   YOB: 1940    Date of Office Visit:  7/13/2020  Date of Hospital Admission: 7/6/20  Date of Hospital Discharge: 7/8/20  Readmission Risk Score(high >=14%. Medium >=10%):No data recorded    Care management risk score Rising risk (score 2-5) and Complex Care (Scores >=6): 0     Non face to face  following discharge, date last encounter closed (first attempt may have been earlier): *No documented post hospital discharge outreach found in the last 14 days *No documented post hospital discharge outreach found in the last 14 days    Call initiated 2 business days of discharge: *No response recorded in the last 14 days     Patient Active Problem List   Diagnosis    HTN (hypertension), benign    Hypertrophy of prostate without urinary obstruction and other lower urinary tract symptoms (LUTS)    Occlusion and stenosis of carotid artery    Automatic implantable cardioverter-defibrillator in situ    CAD (coronary artery disease)    Hypomagnesemia    HLD (hyperlipidemia)    Spinal stenosis of lumbar region without neurogenic claudication    Cortical age-related cataract of both eyes    TIA (transient ischemic attack)    Dementia due to Alzheimer's disease ([facility] [address]    Dyslipidemia    Pancytopenia ([facility] [address]       No Known Allergies    Medications listed as ordered at the time of discharge from hospital   [facility] Medication Instructions ERVIN:    Printed on:07/13/20 1221   Medication Information                      aspirin 81 MG EC tablet  Take 81 mg by mouth daily. losartan (COZAAR) 100 MG tablet  Take 1 tablet by mouth daily             magnesium oxide (MAG-OX) 400 MG tablet  Take 1 tablet by mouth daily.              metoprolol tartrate (LOPRESSOR) 25 MG tablet  Take 0.5 tablets by mouth 2 times daily             Multiple Vitamins-Minerals (MULTIVITAMIN ADULT PO)  Take by mouth simvastatin (ZOCOR) 20 MG tablet  TAKE 1 TABLET BY MOUTH EVERY EVENING             sotalol (BETAPACE) 80 MG tablet  TAKE 1 TABLET BY MOUTH TWICE DAILY                   Medications marked \"taking\" at this time  Outpatient Medications Marked as Taking for the 7/13/20 encounter (Office Visit) with Annabelle Galeana MD   Medication Sig Dispense Refill    Multiple Vitamins-Minerals (MULTIVITAMIN ADULT PO) Take by mouth      losartan (COZAAR) 100 MG tablet Take 1 tablet by mouth daily 30 tablet 3    metoprolol tartrate (LOPRESSOR) 25 MG tablet Take 0.5 tablets by mouth 2 times daily 90 tablet 3    sotalol (BETAPACE) 80 MG tablet TAKE 1 TABLET BY MOUTH TWICE DAILY 180 tablet 3    simvastatin (ZOCOR) 20 MG tablet TAKE 1 TABLET BY MOUTH EVERY EVENING 90 tablet 1    magnesium oxide (MAG-OX) 400 MG tablet Take 1 tablet by mouth daily. (Patient taking differently:   Take 250 mg by mouth daily ) 90 tablet 2    aspirin 81 MG EC tablet Take 81 mg by mouth daily. Medications patient taking as of now reconciled against medications ordered at time of hospital discharge: Yes    Chief Complaint   Patient presents with    Follow-Up from Hospital     He went to [facility] [place] on 7-6-2020 due to confusion. HPI    Inpatient course: Discharge summary reviewed- see chart. Interval history/Current status:   He was taken to the ER by his wife for confusion, had trouble with driving with his son in the car. Was accompanied with blurred vision. He has not had any further confusion. He has follow up with neurology. He does note intermittent flashing lights and blurred vision that persists. Occurs every day and lasts about 2o minutes. He had this years ago and then resolved. His last eye exam was 2 years ago. He saw Dr. Rosamond Eisenmenger on Friday - he referred him to Dr. Tonie Guardado (ophthalmology)  He also was referred to vascular - Dr. Nilda Lyons. He is walking the dog and working in the yard.      He sees 3 Encounters:   07/13/20 (!) 127/54   07/08/20 126/66   12/05/19 124/69       Physical Exam  NAD    Skin is warm and dry. No rash. Well hydrated  Alert and oriented x 3. Mild memory loss - asked the same question a few times. Mood and affect are normal.  PERRLA  EOMI  CN intact. Motor 5/5  and hip flexor. DTR 2/4 patella. Gait is normal.   The neck is supple and free of adenopathy or masses, the thyroid is normal without enlargement or nodules. Chest: clear with no wheezes or rales. No retractions, or use of accessory muscles noted. Cardiovascular: PMI is not displaced, and no thrill noted. Regular rate and rhythm with no rub, murmur or gallop. No peripheral edema. No carotid bruits. The abdomen is soft without tenderness, guarding, mass, rebound or organomegaly. Aorta, femoral, DP and PT pulses intact. Assessment/Plan:  1. Mixed hyperlipidemia  LDL goal < 70   CMP and lipid annually  - simvastatin (ZOCOR) 20 MG tablet; TAKE 1 TABLET BY MOUTH EVERY EVENING  Dispense: 90 tablet; Refill: 1    2. Vision disturbance  Ocular migraine verses retinal tear? Agree with ophthalmology evaluation asap    3. TIA (transient ischemic attack)  Continue aspirin and statin; neurology follow up as planned  - [facility] DISCHARGE MEDS RECONCILED W/ CURRENT OUTPATIENT MED LIST    4. Anemia due to other cause, not classified  Stable post tx. Continue to hold hydroxyurea and follow up with Dr. Jaqueline Reinoso as planned        Medical Decision Making: moderate complexity     Side effects of current medications reviewed and questions answered. Follow up in 3 months or prn.

## 2020-07-23 ENCOUNTER — OFFICE VISIT (OUTPATIENT)
Dept: NEUROLOGY | Age: 80
End: 2020-07-23
Payer: MEDICARE

## 2020-07-23 VITALS
DIASTOLIC BLOOD PRESSURE: 82 MMHG | OXYGEN SATURATION: 95 % | SYSTOLIC BLOOD PRESSURE: 149 MMHG | TEMPERATURE: 97.9 F | BODY MASS INDEX: 23.64 KG/M2 | HEART RATE: 60 BPM | HEIGHT: 68 IN | WEIGHT: 156 LBS

## 2020-07-23 PROBLEM — E53.8 VITAMIN B12 DEFICIENCY DISEASE: Status: ACTIVE | Noted: 2020-07-23

## 2020-07-23 PROBLEM — G93.41 ENCEPHALOPATHY, METABOLIC: Status: ACTIVE | Noted: 2020-07-23

## 2020-07-23 PROCEDURE — 99214 OFFICE O/P EST MOD 30 MIN: CPT | Performed by: PSYCHIATRY & NEUROLOGY

## 2020-07-23 RX ORDER — DONEPEZIL HYDROCHLORIDE 5 MG/1
5 TABLET, FILM COATED ORAL DAILY
Qty: 30 TABLET | Refills: 2 | Status: SHIPPED | OUTPATIENT
Start: 2020-07-23 | End: 2021-04-21

## 2020-07-23 RX ORDER — MULTIVIT-MIN/IRON/FOLIC ACID/K 18-600-40
CAPSULE ORAL
COMMUNITY
End: 2022-05-25

## 2020-07-23 RX ORDER — UBIDECARENONE 75 MG
50 CAPSULE ORAL DAILY
COMMUNITY
End: 2022-05-25

## 2020-07-23 NOTE — PROGRESS NOTES
The patient came today for follow up regarding: recent confusion, dementia and hospital follow up. The patient came today with his wife for hospital follow-up. He was seen at [facility] about 2 weeks ago. He came in with acute amnesia and acute encephalopathy. He was admitted to the hospital.  Further work-up with a CT of the head showed no acute stroke. CTA showed chronic left vertebral stenosis. He has a pacemaker and thus was not compliant with MRI. Patient symptoms improved and he was discharged home. He came today to discuss chronic cognitive impairment. According to the patient and his wife, his been having intermittent short-term memory loss for the last year or so. Degree is mild to moderate. Frequency is daily but waxing and waning. He can forget details of recent conversation or misplaces items at home. Symptoms can be worse with distraction or multitasking. So far is able to function apparently. He denies any recent MVA or getting lost in directions. He is able to care for himself. He takes care of his bills and finances without major problems. Wife denies any psychosis or hallucination. No recent head trauma, LOC or recent fever or chills. No recurrent falling. He does have family history of dementia in his father side. So far he denies any long-term memory loss. He denies any major sleep disturbance, insomnia or symptoms of sleep apnea. Initial blood test in the hospital showed pancytopenia as well as B12 deficiency with a B12 of 219. He is currently on oral B12 supplement. TSH was normal.    He denies any new symptoms today. He is on blood pressure medication and a statin at night. He takes aspirin daily. Other review of system was unremarkable.       Family History   Problem Relation Age of Onset    Cancer Mother     Alzheimer's Disease Father     Cancer Brother         melanoma    Cancer Brother     Heart Disease Neg Hx     High Blood Pressure Neg Hx     High Cholesterol Neg Hx        Past Medical History:   Diagnosis Date    Blood circulation, collateral     carotid artery occlusion    BPH w/o urinary obs/LUTS 4/22/2011    CAD (coronary artery disease)     Carotid 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 4/22/2011    D[facility] to [facility]     Dizziness 9/27/2012    Elevated prostate specific antigen (PSA) 4/22/2011    Essential hypertension, benign 4/22/2011    Mononucleosis age 49    caused spleen to rupture    Pneumothorax     Pure hypercholesterolemia 4/22/2011    Spinal stenosis of lumbar region without neurogenic claudication 10/31/2017    Mild to moderate L4-5    Ventricular tachycardia ([facility] 10/2012     Prior to Visit Medications    Medication Sig Taking? Authorizing Provider   vitamin B-12 (CYANOCOBALAMIN) 100 MCG tablet Take 50 mcg by mouth daily Yes Historical Provider, MD   Cholecalciferol (VITAMIN D) 50 MCG (2000 UT) CAPS capsule Take by mouth Yes Historical Provider, MD   donepezil (ARICEPT) 5 MG tablet Take 1 tablet by mouth daily Yes Sheila Kurtz MD   Multiple Vitamins-Minerals (MULTIVITAMIN ADULT PO) Take by mouth Yes Historical Provider, MD   simvastatin (ZOCOR) 20 MG tablet TAKE 1 TABLET BY MOUTH EVERY EVENING Yes Lamont Berrios MD   losartan (COZAAR) 100 MG tablet Take 1 tablet by mouth daily Yes Lamont Berrios MD   metoprolol tartrate (LOPRESSOR) 25 MG tablet Take 0.5 tablets by mouth 2 times daily Yes JOSE Mancilla CNP   sotalol (BETAPACE) 80 MG tablet TAKE 1 TABLET BY MOUTH TWICE DAILY Yes JOSE Mancilla CNP   magnesium oxide (MAG-OX) 400 MG tablet Take 1 tablet by mouth daily. Patient taking differently:   Take 250 mg by mouth daily  Yes JOSE Matta CNP   aspirin 81 MG EC tablet Take 81 mg by mouth daily.  Yes Historical Provider, MD     No Known Allergies  Social History     Tobacco Use    Smoking status: Former Smoker     Packs/day: 1.50     Years: 35.00     Pack years: 52.50     Start date: 1958     Last attempt to quit: 1991     Years since quittin.5    Smokeless tobacco: Never Used   Substance Use Topics    Alcohol use: No     Past Surgical History:   Procedure Laterality Date    CARDIAC DEFIBRILLATOR PLACEMENT  10/10/12    dual chamber ICD [employer]    CATARACT REMOVAL WITH IMPLANT  2018    CHEST TUBE INSERTION      OTHER SURGICAL HISTORY  10/2012    insertion of cardiac recorder    SPLENECTOMY             Exam:   Constitutional:   Vitals:    20 1145   BP: (!) 149/82   Pulse: 60   Temp: 97.9 °F (36.6 °C)   TempSrc: Infrared   SpO2: 95%   Weight: 156 lb (70.8 kg)   Height: 5' 8\" (1.727 m)       General appearance:  Normal development and appear in no acute distress. Eye: No icterus. Neck: supple  Cardiovascular:   No lower leg edema with good pulsation. Mental Status:   Oriented to person, place, problem, and time. Memory: Poor immediate recall. Intact remote memory  Normal attention span and concentration. Language: intact naming, repeating and fluency   Good fund of Knowledge. Aware of current events and vocabulary   Cranial Nerves:   II: Pupils: equal, round, reactive to light  III,IV,VI: Extra Ocular Movements are intact. No nystagmus  V: Facial sensation is intact   VII: Facial strength and movements: intact and symmetric  IX: Palate elevation is symmetric  XI: Shoulder shrug is intact  XII: Tongue movements are normal  Musculoskeletal: 5/5 in all 4 extremities. Tone: Normal tone. Reflexes: symmetric in arms and legs  Coordination: no pronator drift, no dysmetria with FNF. Normal REM. Sensation: normal to all modalities in both arms and legs.   Gait/Posture: steady gait     ROS : A 10-14 system review of constitutional, cardiovascular, respiratory, GI, eyes, , ENT, musculoskeletal, endocrine, hematological, skin, SHEENT, genitourinary, psychiatric and neurologic systems was obtained and updated today which is unremarkable except as mentioned in my HPI      Medical decision making:  I personally reviewed and updated social history, past medical history, medications, allergy, surgical history, and family history as documented in the patient's electronic health records. Labs and/or neuroimaging and other test results reviewed and discussed with the patient. Reviewed notes from other physicians. Provided patient education regarding risk, benefits and treatment options as well as adherence to medication regimen and side effect from these medications. Assessment:  1. Encephalopathy, metabolic  2. Dementia due to Alzheimer's disease ([facility] [address]    Acute on chronic encephalopathy. Likely is acute encephalopathy and amnesia that started 2 weeks ago could be related to metabolic encephalopathy. Seems to be improving. He does have chronic cognitive impairment and likely underlying dementia. Could be mild to moderate dementia of aging versus dementia of Alzheimer disease. He does have family history of similar illness. Long discussion with the patient and his wife regarding possibility of dementia, differential diagnosis and prognosis. Continue current cognitive and behavior therapy at home  Discussed driving precautions and restrictions  Start Aricept 5 mg daily for the next 2 to 3-month and then increase it to 10 mg daily  Side effect of Aricept was discussed in details. 3. HTN (hypertension), benign  Continue current blood pressure medication  Monitor blood pressure at home  Aspirin for stroke prevention    4. Dyslipidemia  Statin and follow LDL and lipid panel    5. Thrombocytosis and anemia   follow-up with hematology and follow-up on his CBC      6. Vitamin B12 deficiency disease  This could be also contributed to his dementia  Would recommend IM 1 mg injection monthly for 6-month and then orally.   He will discuss this issue with his PCP      RTC three months

## 2020-08-05 ENCOUNTER — TELEPHONE (OUTPATIENT)
Dept: FAMILY MEDICINE CLINIC | Age: 80
End: 2020-08-05

## 2020-08-05 NOTE — TELEPHONE ENCOUNTER
Cherylene Boers from [employer] services is calling requesting a meal order be placed for the pt as one was not place upon his discharge from the hospital in June. She adv his meal plan benefits expires 8/9/2020 adv can be submitted to [facility].  Adv to call [place] provider services so PA can be done over the phone [phone]

## 2020-08-11 ENCOUNTER — TELEPHONE (OUTPATIENT)
Dept: FAMILY MEDICINE CLINIC | Age: 80
End: 2020-08-11

## 2020-08-12 ENCOUNTER — TELEPHONE (OUTPATIENT)
Dept: FAMILY MEDICINE CLINIC | Age: 80
End: 2020-08-12

## 2020-08-12 NOTE — TELEPHONE ENCOUNTER
LM asking patient to call us back. I don't know what he means by \"order for referral for food assistance\". Please have patient clarify exactly what is needed.

## 2020-08-12 NOTE — TELEPHONE ENCOUNTER
Spoke to patient, he is past the 30 day limit for food assistance and we don't set that up. It would come through the hospital.  Patient is ok and said he is feeling better. He was disappointed that he didn't get a service that he thought he was eligible for.

## 2020-08-12 NOTE — TELEPHONE ENCOUNTER
----- Message from Karena Dahl sent at 8/12/2020 12:12 PM EDT -----  Subject: Message to Provider    QUESTIONS  Information for Provider? pt returning call  ---------------------------------------------------------------------------  --------------  [address]  What is the best way for the office to contact you? OK to leave message on   voicemail  Preferred Call Back Phone Number? [phone]  ---------------------------------------------------------------------------  --------------  SCRIPT ANSWERS  Relationship to Patient?  Self

## 2020-08-17 RX ORDER — DONEPEZIL HYDROCHLORIDE 5 MG/1
TABLET, FILM COATED ORAL
Qty: 30 TABLET | Refills: 2 | OUTPATIENT
Start: 2020-08-17

## 2020-08-25 ENCOUNTER — TELEPHONE (OUTPATIENT)
Dept: FAMILY MEDICINE CLINIC | Age: 80
End: 2020-08-25

## 2020-08-25 NOTE — TELEPHONE ENCOUNTER
High multi times  Losartan 1 qd  Metoprolol 25 mg BID      Amlodipine/irbesartan  was D/C'd in July upon hospital discharge.   Restart Amlodipine 5 mg daily  Call in 2 weeks w/BP readings  Call if feet swell    Pt aware

## 2020-08-25 NOTE — TELEPHONE ENCOUNTER
Please advise  Thank you    ----- Message from Kiarra Caballero sent at 8/25/2020  2:45 PM EDT -----  Subject: Message to Provider    QUESTIONS  Information for Provider? pt BP running high around 160/80   ---------------------------------------------------------------------------  --------------  CALL BACK INFO  What is the best way for the office to contact you? OK to leave message on   voicemail  Preferred Call Back Phone Number? [phone]  ---------------------------------------------------------------------------  --------------  SCRIPT ANSWERS  Relationship to Patient?  Self

## 2020-08-25 NOTE — TELEPHONE ENCOUNTER
Has been high multiple times or just once? He is taking his meds as prescribed? Is he eating a lot of salty foods? Any chest pain, swelling in his legs or shortness of breath?

## 2020-09-08 ENCOUNTER — OFFICE VISIT (OUTPATIENT)
Dept: CARDIOLOGY CLINIC | Age: 80
End: 2020-09-08
Payer: MEDICARE

## 2020-09-08 ENCOUNTER — TELEPHONE (OUTPATIENT)
Dept: FAMILY MEDICINE CLINIC | Age: 80
End: 2020-09-08

## 2020-09-08 ENCOUNTER — NURSE ONLY (OUTPATIENT)
Dept: CARDIOLOGY CLINIC | Age: 80
End: 2020-09-08
Payer: MEDICARE

## 2020-09-08 VITALS
DIASTOLIC BLOOD PRESSURE: 75 MMHG | HEART RATE: 66 BPM | SYSTOLIC BLOOD PRESSURE: 151 MMHG | BODY MASS INDEX: 23.34 KG/M2 | WEIGHT: 154 LBS | OXYGEN SATURATION: 95 % | HEIGHT: 68 IN

## 2020-09-08 PROCEDURE — 99214 OFFICE O/P EST MOD 30 MIN: CPT | Performed by: INTERNAL MEDICINE

## 2020-09-08 PROCEDURE — 93000 ELECTROCARDIOGRAM COMPLETE: CPT | Performed by: INTERNAL MEDICINE

## 2020-09-08 PROCEDURE — 93283 PRGRMG EVAL IMPLANTABLE DFB: CPT | Performed by: INTERNAL MEDICINE

## 2020-09-08 RX ORDER — HYDROXYUREA 500 MG/1
CAPSULE ORAL
COMMUNITY
Start: 2020-02-07 | End: 2021-06-28

## 2020-09-08 RX ORDER — AMLODIPINE BESYLATE 5 MG/1
TABLET ORAL
Qty: 90 TABLET | Refills: 1 | Status: SHIPPED | OUTPATIENT
Start: 2020-09-08 | End: 2021-04-21 | Stop reason: SDUPTHER

## 2020-09-08 NOTE — PROGRESS NOTES
[address]   Electrophysiology Followup    Referring Provider:  Jaylen Cho MD     CC: VT     History of Present Illness:  Tyson Wood is 79 y.o. male history of ventricular tachycardia s/p AICD implantation. He initially presented to the hospital in September 2014 with several episodes of pre-syncopal episodes while driving. He underwent a cardiac catheterization which showed non-obstructive CAD and echo revealed moderate LVH and diastolic dysfunction. Later he had documented symptomatic ventricular tachycardia. Continues to work full time as a . 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 He also is treated with Sotalol. Interval history:   Laurence Mcintyre presents to the office in follow up. He is doing ok from a cardiac standpoint. He was recently hospitalized 7/2020 for pancytopenia secondary to hydroxyurea. He is following with hematology. Denies having any chest pain or shortness of breath. Device interrogation today with battery KRISTINA. 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 Surgical History: has a past surgical history that includes Splenectomy; chest tube insertion; other surgical history (10/2012); Cardiac defibrillator placement (10/10/12); and Cataract removal with implant (08/2018). Social History: reports that he quit smoking about 29 years ago. He started smoking about 62 years ago. He has a 52.50 pack-year smoking history. He has never used smokeless tobacco. He reports that he does not drink alcohol or use drugs.      Family History:family history includes Alzheimer's Disease in his father; Cancer in his brother, brother, and mother. Current Outpatient Medications   Medication Sig Dispense Refill    amLODIPine (NORVASC) 5 MG tablet TAKE 1 TABLET BY MOUTH EVERY DAY 90 tablet 1    hydroxyurea (HYDREA) 500 MG chemo capsule Take by mouth Monday, Wednesday, Friday      vitamin B-12 (CYANOCOBALAMIN) 100 MCG tablet Take 50 mcg by mouth daily      Cholecalciferol (VITAMIN D) 50 MCG (2000 UT) CAPS capsule Take by mouth      Multiple Vitamins-Minerals (MULTIVITAMIN ADULT PO) Take by mouth      simvastatin (ZOCOR) 20 MG tablet TAKE 1 TABLET BY MOUTH EVERY EVENING 90 tablet 1    losartan (COZAAR) 100 MG tablet Take 1 tablet by mouth daily 90 tablet 1    metoprolol tartrate (LOPRESSOR) 25 MG tablet Take 0.5 tablets by mouth 2 times daily 90 tablet 3    sotalol (BETAPACE) 80 MG tablet TAKE 1 TABLET BY MOUTH TWICE DAILY 180 tablet 3    magnesium oxide (MAG-OX) 400 MG tablet Take 1 tablet by mouth daily. (Patient taking differently:   Take 250 mg by mouth daily ) 90 tablet 2    aspirin 81 MG EC tablet Take 81 mg by mouth daily.  donepezil (ARICEPT) 5 MG tablet Take 1 tablet by mouth daily 30 tablet 2     No current facility-administered medications for this visit. Allergies:  Patient has no known allergies. DATA:  Labs reviewed  Lab Results   Component Value Date    TSH 3.840 07/02/2020    TSH 4.060 12/06/2019    CREATININE 0.8 07/08/2020    CREATININE 1.0 07/06/2020    AST 13 07/06/2020    ALT 13 07/06/2020       Diagnostic testing:  Pertinent reports were reviewed as a part of this visit. Last Echo: 9/29/12 This is a technically adequate echocardiography study. Left ventricular size is normal. There was moderate left ventricular hypertrophy. Left ventricular systolic function appears  to be normal. Ejection fraction is estimated at 60%. There was evidence of Grade 1 diastolic dysfunction.  Right ventricular size appears to be normal. Right ventricular  systolic function appears to be normal. Left atrial size is normal. The right atrial size appears to be normal.  Aortic root size measurements are with normal range. The aortic valve is trileaflet. Leaflets were mildly thickened. There was no evidence of aortic stenosis. No significant aortic  regurgitation was noted. Mitral valve leaflets were mildly thickened. There was mild mitral regurgitation. The tricuspid valve is structurally normal. Trivial tricuspid   regurgitation is noted. Right ventricular systolic pressure cannot be estimated based on this study. The pulmonic valve was poorly seen. No significant pericardial effusion was noted. LHC 9/28/12 EF 60%, WM normal,   LM nl, LAD nl, LAD nl, Diag2 prox 20%, circ nl, OM1 ostial 30%, RCA LI    ECG 9/8/20  Sinus Rhythm      Review of System:  All other systems reviewed and are negative except for that noted above. Pertinent negatives are:     · General: negative for fever, chills   · Ophthalmic ROS: negative for - eye pain or loss of vision  · ENT ROS: negative for - headaches, sore throat   · Respiratory: negative for - cough, sputum  · Cardiovascular: Reviewed in HPI  · Gastrointestinal: negative for - abdominal pain, diarrhea, N/V  · Hematology: negative for - bleeding, blood clots, bruising or jaundice  · Genito-Urinary:  negative for - Dysuria or incontinence  · Musculoskeletal: negative for - Joint swelling, muscle pain  · Neurological: negative for - confusion, dizziness, headaches   · Psychiatric: No anxiety, no depression. · Dermatological: negative for - rash    Physical Examination:    Vitals:    09/08/20 1530   BP: (!) 151/75   Pulse: 66   SpO2: 95%       Wt Readings from Last 3 Encounters:   09/08/20 154 lb (69.9 kg)   07/23/20 156 lb (70.8 kg)   07/13/20 157 lb 6.4 oz (71.4 kg)     · Constitutional: Oriented. No distress. · Head: Normocephalic and atraumatic.    · Mouth/Throat: Oropharynx is clear and moist.   · Eyes: Conjunctivae normal. EOM are normal.   · Neck: Neck supple. No JVD present. · Cardiovascular: Normal rate, regular rhythm, S1&S2. · Pulmonary/Chest: Bilateral respiratory sounds. No rhonchi. · Abdominal: Soft. No tenderness. · Musculoskeletal: No tenderness. No edema    · Lymphadenopathy: Has no cervical adenopathy. · Neurological: Alert and oriented. Follows command, No Gross deficit   · Skin: Skin is warm, No rash noted. · Psychiatric: Has a normal behavior       Assessment:      Ventricular tachycardia s/p AICD:    Device interrogation with battery KRISTINA. He has history of ventricular tachycardia and the device has been implanted for secondary prevention. He is on sotalol. NYHAFC: I      Sotalol 80 mg bid    normal.     Continue medical therapy    Dual chamber defibrillator in place -  The CIED was interrogated and programmed and I supervised and reviewed all the data. All findings and changes are in device interrogation sheat and reflect my personal interpretation and changes and is scanned to Epic. We will schedule him for Gen change    The risks, benefits and alternatives of the procedure were discussed with the patient. The risks including, but not limited to, the risks of bleeding, infection, pain, device malfunction, lead dislodgement, radiation exposure, injury to cardiac and surrounding structures (including pneumothorax), stroke, cardiac perforation, tamponade, need for emergent heart surgery, myocardial infarction and death were discussed in detail. Dual vs single chamber device, including risks and benefits were discussed with patient. Mild non-obstructive CAD (coronary artery disease) -    -9/28/12 [facility]: EF 60%, WM normal, LM nl, LAD nl, LAD nl, Diag2 prox 20%, circ nl, OM1 ostial 30%, RCA LI.    -Stable. -ASA 81 mg daily   -Simvastatin 20 mg daily   -Metoprolol 12.5 mg twice a day   -Losartan 100 mg daily    Hyperlipidemia -   Simvastatin. HTN:      Vitals:    09/08/20 1530   BP: (!) 151/75   Pulse: 66   SpO2: 95%      Attests to having white coat HTN   Home BP monitoring encourage with a BP goal <130/80    - The patient is counseled to follow a low salt diet to assure blood pressure remains controlled for cardiovascular risk factor modification.   - The patient is counseled to avoid excess caffeine, and energy drinks as this may exacerbated ectopy and arrhythmia. - The patient is counseled to get regular exercise 3-5 times per week to control cardiovascular risk factors. NOTE: This report was transcribed using voice recognition software. Every effort was made to ensure accuracy, however, inadvertent computerized transcription errors may be present. Daiana Giron MD, MPH  [place] [id number]   Office: [phone]       Physician Attestation: I, Dr. Daiana Giron, confirm that the scribe's documentation has been prepared under my direction and personally reviewed by me in its entirety. I also confirm that the note above accurately reflects all work, treatment, procedures, and medical decision making performed by me.

## 2020-09-08 NOTE — PROGRESS NOTES
Patient comes in for programming evaluation for his defibrillator. All sensing and pacing parameters are within normal range. Battery life RRT= 2.63V-2.60V today. AP 14.1%.  <0.1%. No episodes/events noted. No changes need to be made at this time. Please see interrogation for more detail. Optivol is within normal range. Patient will see Dr. Dheeraj Jmaes today to discuss Gen Change.

## 2020-09-08 NOTE — LETTER
[facility]  EP Procedure Sheet    9/8/20  Nancy Singh  1940  EP Procedures   Pacemaker implant (single/dual)  EP Study    ICD implant (single/dual)  Atrial flutter ablation (MARK Y/N)    Biv implant ICD  Tilt Table    Biv implant PPM  Atrial fibrillation ablation (MARK Yes)   xxx Generator Change (ICD)  SVT ablation    Lead revision (RV/LA/RA) (<1 month)  VT ablation      Lead extraction +/- upgrade (BiV/PPM/ICD)  VT Ischemic/ non-ischemic    Loop implant/ removal  VT RVOT    Cardioversion  VT Left sided    MARK  AVN ablation     Equipment  xxxx Medtronic   [employer]ERSON Mapping System    [employer]  Carto Mapping System    [employer]  CryoAblation    Biotronik  Laser Lead Extraction     EP Procedures Scheduling Request    # hours Requested   Scheduled  Date:   Specific Day  Completed    Anesthesia  F/u Date:   CT surgery backup  COVID     Overnight stay      Location [facility]       Pre-Procedure Labs / Imaging     PT/INR  Type & cross    CBC  Units PRBC    BMP/Mg  Units FFP    Venogram  Cardiac CTA for Pulmonary vein mapping     RN INITIALS: RA    Patient Instructions  Do not eat or drink after midnight the night prior to procedure  DX ICD at RRT

## 2020-09-08 NOTE — PATIENT INSTRUCTIONS
You are scheduled for a ICD battery change    Our  will call you to discuss a date for you procedure. The Cath Lab will call you a week before your procedure. The night before your procedure you will need to scrub with Hibiclens wash. The day of your procedure you will need to check in at the registration desk, which is in the main lobby at [place] [id number]. will need to fast for at least 8 hours prior to you procedure. You may take all other medications with a sip of water the morning of your procedure. Please have a responsible adult to drive you home upon discharge. The discharging unit will be giving you discharge instructions. If you have any questions regarding your procedure itself or your medications, please call [phone] and ask to talk to an EP nurse. You will be seen in the office in 1 week for a wound check and then 3 months following implantation.

## 2020-09-10 RX ORDER — DONEPEZIL HYDROCHLORIDE 5 MG/1
TABLET, FILM COATED ORAL
Qty: 30 TABLET | Refills: 2 | OUTPATIENT
Start: 2020-09-10

## 2020-09-10 NOTE — TELEPHONE ENCOUNTER
# 30  + 2 refill(s) was escribed on 07.23.20; therefore, no refill is needed until 10.21.20 and pt has a fu appt 10.12.20.

## 2020-09-25 ENCOUNTER — TELEPHONE (OUTPATIENT)
Dept: CARDIOLOGY CLINIC | Age: 80
End: 2020-09-25

## 2020-09-25 NOTE — TELEPHONE ENCOUNTER
Called patient and advised him to send a remote transmission to ensure that his device is working appropriately. The noise is heard daily in the morning for the past two days. Will call him back when the transmission is received.

## 2020-09-25 NOTE — TELEPHONE ENCOUNTER
Pt states he is scheduled for a generator Change on 11/4/20, but recently  has been getting a beeping signal that lasts 3-4 sec.  Please call to advise

## 2020-10-09 RX ORDER — DONEPEZIL HYDROCHLORIDE 5 MG/1
TABLET, FILM COATED ORAL
Qty: 30 TABLET | Refills: 2 | OUTPATIENT
Start: 2020-10-09

## 2020-10-09 NOTE — TELEPHONE ENCOUNTER
Patient needs to schedule a follow up appointment. Last seen 07.23.20    Last office note states to RTO 3 months/10.2020    Next appointment scheduled for NONE. Patient cancelled fu appt that was scheduled for 10.14.20 and did not reschedule.

## 2020-10-21 ENCOUNTER — OFFICE VISIT (OUTPATIENT)
Dept: FAMILY MEDICINE CLINIC | Age: 80
End: 2020-10-21
Payer: MEDICARE

## 2020-10-21 VITALS
BODY MASS INDEX: 24.33 KG/M2 | TEMPERATURE: 97.7 F | HEART RATE: 62 BPM | OXYGEN SATURATION: 94 % | SYSTOLIC BLOOD PRESSURE: 116 MMHG | DIASTOLIC BLOOD PRESSURE: 58 MMHG | WEIGHT: 160 LBS | RESPIRATION RATE: 10 BRPM

## 2020-10-21 PROBLEM — R41.81 AGE-RELATED COGNITIVE DECLINE: Status: ACTIVE | Noted: 2020-10-21

## 2020-10-21 PROBLEM — G93.41 ENCEPHALOPATHY, METABOLIC: Status: RESOLVED | Noted: 2020-07-23 | Resolved: 2020-10-21

## 2020-10-21 PROCEDURE — 99214 OFFICE O/P EST MOD 30 MIN: CPT | Performed by: FAMILY MEDICINE

## 2020-10-21 NOTE — PROGRESS NOTES
Subjective:      Patient ID: Jessica Oliveira 79 y.o. male. The primary encounter diagnosis was HTN (hypertension), benign. A diagnosis of Mixed hyperlipidemia was also pertinent to this visit. BEREKET Beltrán was hospitalized in July with low plts due to hydroxyurea. He sees Dr. Jared Wright for thrombocytosis. He had an acute encephalopathy. He saw Dr. Emeli Astudillo in follow up. He and his wife gave a hx of memory problems and he was diagnosed with Alzheimer's verses age related memory loss. His father had Alzheimer's disease. He was started on Aricept 5 mg. He cancelled follow up with Dr. Emeli Astudillo. He felt the visit was good. He says DR. Salazar did not think \"I had a problem\". He stopped the Aricept - did not think it helped and he did not want to take so many medications. He does not feel he has any memory problems but his wife thinks he has memory loss. She says he has long term but no short term memory loss. He has not stopped doing anything due to memory problems. Pays bills, drives, etc with no problem. He saw Dr. Jared Wright last week. CBC was improved with h/h 14/41. 4  ptls 399  WBC 7.9   His dose or hydroxyurea was decreased to 3 times a week after his hospitalization. B12 borderline low 219; repeat with supplement - 468 on 7/24/20/      He complains of pain in the right side of the neck since before he was in the hospital.  The pain feels deep. Hurts to turn his head. No radicular pain and no numbness or weakness in the arms. The pain is mild. He just wants to be sure it is not serious. He saw Dr. Walton Late for afib and AICD on 9/8. No medication changes were made. His BP was elevated at appt but reported as normal at home. He has a hx of splenectomy. Never had meningococcal vaccines that I can see documented. He is up to date on pneumococcal vaccine. Is shingrix and flu. Hypertension: Patient here for follow-up of elevated blood pressure.  He is exercising and is adherent to low salt diet. Blood pressure is well controlled at home. Cardiac symptoms none. Patient denies chest pressure/discomfort, dyspnea, exertional chest pressure/discomfort, lower extremity edema, near-syncope, orthopnea, palpitations and paroxysmal nocturnal dyspnea. Cardiovascular risk factors: none. Use of agents associated with hypertension: none. History of target organ damage: cad.     Lab Results   Component Value Date     07/08/2020    K 3.8 07/08/2020     07/08/2020    CO2 25 07/08/2020    BUN 18 07/08/2020    CREATININE 0.8 07/08/2020    GLUCOSE 123 (H) 07/08/2020    CALCIUM 8.9 07/08/2020    PROT 6.3 (L) 07/06/2020    LABALBU 3.6 07/06/2020    BILITOT 0.7 07/06/2020    ALKPHOS 68 07/06/2020    AST 13 (L) 07/06/2020    ALT 13 07/06/2020    LABGLOM >60 07/08/2020    GFRAA >60 07/08/2020    AGRATIO 1.3 07/06/2020    GLOB 2.7 07/06/2020        Lab Results   Component Value Date    CHOL 126 07/07/2020    CHOL 124 07/02/2020    CHOL 138 12/06/2019     Lab Results   Component Value Date    TRIG 154 (H) 07/07/2020    TRIG 109 07/02/2020    TRIG 138 12/06/2019     Lab Results   Component Value Date    HDL 29 (L) 07/07/2020    HDL 33 (L) 07/02/2020    HDL 30 (L) 12/06/2019     Lab Results   Component Value Date    LDLCALC 66 07/07/2020    LDLCALC 69 07/02/2020    LDLCALC 80 12/06/2019     Lab Results   Component Value Date    LABVLDL 31 07/07/2020    LABVLDL 22 07/02/2020    LABVLDL 28 12/06/2019     No results found for: CHOLHDLRATIO  TSH   Date Value Ref Range Status   07/02/2020 3.840 0.450 - 4.500 uIU/mL Final   12/06/2019 4.060 0.450 - 4.500 uIU/mL Final   08/08/2018 2.870 0.450 - 4.500 uIU/mL Final     Lab Results   Component Value Date    WBC 4.0 07/08/2020    HGB 8.5 (L) 07/08/2020    HCT 23.8 (L) 07/08/2020    .5 (H) 07/08/2020     07/08/2020     Lab Results   Component Value Date    DGSVEJQK82 219 07/06/2020      CTA OF THE HEAD AND NECK WITH CONTRAST 7/6/2020 2:05 pm:      TECHNIQUE:    CTA of the head and neck was performed with the administration of intravenous    contrast. Multiplanar reformatted images are provided for review.  MIP images    are provided for review. Stenosis of the internal carotid arteries measured    using NASCET criteria. Dose modulation, iterative reconstruction, and/or    weight based adjustment of the mA/kV was utilized to reduce the radiation    dose to as low as reasonably achievable.         COMPARISON:    None.         HISTORY:    ORDERING SYSTEM PROVIDED HISTORY: intermittent dizziness    TECHNOLOGIST PROVIDED HISTORY:    Reason for exam:->intermittent dizziness    Reason for Exam: dizzy intermittent; mental status change    Acuity: Unknown    Type of Exam: Unknown         FINDINGS:         CTA NECK:         AORTIC ARCH/ARCH VESSELS: No dissection or arterial injury.  No significant    stenosis of the brachiocephalic or subclavian arteries.         CAROTID ARTERIES: There are 50% stenoses of both proximal cervical internal    carotid arteries.         VERTEBRAL ARTERIES: There is a severe stenosis at the origin of the left    vertebral artery.         SOFT TISSUES: There are emphysematous changes of the lungs.         BONES: No acute osseous abnormality.              CTA HEAD:         ANTERIOR CIRCULATION: No significant stenosis of the intracranial internal    carotid, anterior cerebral, or middle cerebral arteries. No aneurysm.         POSTERIOR CIRCULATION: No significant stenosis of the vertebral, basilar, or    posterior cerebral arteries. No aneurysm.         OTHER: No dural venous sinus thrombosis on this non-dedicated study.         BRAIN: See separately dictated noncontrast head CT report.              Impression    No large vessel occlusion visualized within the head or neck.         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         50% stenosis of both proximal cervical ICAs.       EXAMINATION:    CT OF THE HEAD WITHOUT CONTRAST  7/6/2020 2:03 pm         TECHNIQUE:    CT of the head was performed without the administration of intravenous    contrast. Dose modulation, iterative reconstruction, and/or weight based    adjustment of the mA/kV was utilized to reduce the radiation dose to as low    as reasonably achievable.         COMPARISON:    09/27/2012         HISTORY:    ORDERING SYSTEM PROVIDED HISTORY: dizzy intermittent    TECHNOLOGIST PROVIDED HISTORY:    Has a \"code stroke\" or \"stroke alert\" been called? ->No    Reason for exam:->dizzy intermittent    Reason for Exam: dizzy intermittent; mental status change    Acuity: Unknown    Type of Exam: Unknown         FINDINGS:    BRAIN/VENTRICLES: There is no acute intracerebral hemorrhage or extra-axial    fluid collection. There is mild cerebral atrophy with mild periventricular,    subcortical and deep white matter small vessel ischemic disease.         ORBITS: Status post bilateral cataract removal.         SINUSES: Mild mucosal hypertrophy involves the left maxillary sinus.  Mucous    retention cyst is present in the right sphenoid sinus.         SOFT TISSUES/SKULL:  The calvarium is intact.              Impression    1. No acute intracranial abnormality.           Outpatient Medications Marked as Taking for the 10/21/20 encounter (Office Visit) with Mihai Jo MD   Medication Sig Dispense Refill    amLODIPine (NORVASC) 5 MG tablet TAKE 1 TABLET BY MOUTH EVERY DAY 90 tablet 1    hydroxyurea (HYDREA) 500 MG chemo capsule Take by mouth Monday, Wednesday, Friday      vitamin B-12 (CYANOCOBALAMIN) 100 MCG tablet Take 50 mcg by mouth daily      Cholecalciferol (VITAMIN D) 50 MCG (2000 UT) CAPS capsule Take by mouth      Multiple Vitamins-Minerals (MULTIVITAMIN ADULT PO) Take by mouth      simvastatin (ZOCOR) 20 MG tablet TAKE 1 TABLET BY MOUTH EVERY EVENING 90 tablet 1    losartan (COZAAR) 100 MG tablet Take 1 tablet by mouth daily 90 tablet 1    metoprolol tartrate (LOPRESSOR) 25 MG tablet Take 0.5 tablets by mouth 2 times daily 90 tablet 3    sotalol (BETAPACE) 80 MG tablet TAKE 1 TABLET BY MOUTH TWICE DAILY 180 tablet 3    magnesium oxide (MAG-OX) 400 MG tablet Take 1 tablet by mouth daily. (Patient taking differently:   Take 250 mg by mouth daily ) 90 tablet 2    aspirin 81 MG EC tablet Take 81 mg by mouth daily.            No Known Allergies    Patient Active Problem List   Diagnosis    HTN (hypertension), benign    Hypertrophy of prostate without urinary obstruction and other lower urinary tract symptoms (LUTS)    Occlusion and stenosis of carotid artery    Automatic implantable cardioverter-defibrillator in situ    CAD (coronary artery disease)    Hypomagnesemia    HLD (hyperlipidemia)    Spinal stenosis of lumbar region without neurogenic claudication    Cortical age-related cataract of both eyes    TIA (transient ischemic attack)    Dementia due to Alzheimer's disease ([address]    Dyslipidemia    Pancytopenia ([address]    Encephalopathy, metabolic    Vitamin F03 deficiency disease       Past Medical History:   Diagnosis Date    Blood circulation, collateral     carotid artery occlusion    BPH w/o urinary obs/LUTS 4/22/2011    CAD (coronary artery disease)     Carotid 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 4/22/2011    Dementia due to Alzheimer's disease ([address]     Dizziness 9/27/2012    Elevated prostate specific antigen (PSA) 4/22/2011    Essential hypertension, benign 4/22/2011    Mononucleosis age 49    caused spleen to rupture    Pneumothorax     Pure hypercholesterolemia 4/22/2011    Spinal stenosis of lumbar region without neurogenic claudication 10/31/2017    Mild to moderate L4-5    Ventricular tachycardia ([address] 10/2012       Past Surgical History:   Procedure Laterality Date    CARDIAC DEFIBRILLATOR PLACEMENT  10/10/12    dual chamber ICD [employer]    CATARACT REMOVAL WITH IMPLANT  08/2018    CHEST TUBE INSERTION      OTHER SURGICAL HISTORY  10/2012 insertion of cardiac recorder    SPLENECTOMY          Family History   Problem Relation Age of Onset    Cancer Mother     Alzheimer's Disease Father     Cancer Brother         melanoma    Cancer Brother     Heart Disease Neg Hx     High Blood Pressure Neg Hx     High Cholesterol Neg Hx        Social History     Tobacco Use    Smoking status: Former Smoker     Packs/day: 1.50     Years: 35.00     Pack years: 52.50     Start date: 1958     Last attempt to quit: 1991     Years since quittin.8    Smokeless tobacco: Never Used   Substance Use Topics    Alcohol use: No    Drug use: No            Review of Systems  Review of Systems    Objective:   Physical Exam  Vitals:    10/21/20 1048 10/21/20 1131   BP: (!) 147/62 (!) 116/58   Pulse: 62    Resp: 10    Temp: 97.7 °F (36.5 °C)    TempSrc: Temporal    SpO2: 94%    Weight: 160 lb (72.6 kg)      Wt Readings from Last 3 Encounters:   10/21/20 160 lb (72.6 kg)   20 154 lb (69.9 kg)   20 156 lb (70.8 kg)         Physical Exam  NAD    Skin is warm and dry. No rash. Well hydrated  Alert and oriented x 3. Mood and affect are normal.  The neck is supple and free of adenopathy or masses, the thyroid is normal without enlargement or nodules. Chest: clear with no wheezes or rales. No retractions, or use of accessory muscles noted. Cardiovascular: PMI is not displaced, and no thrill noted. Regular rate and rhythm with no rub, murmur or gallop. No peripheral edema. No carotid bruits. The abdomen is soft without tenderness, guarding, mass, rebound or organomegaly. Aorta, femoral, DP and PT pulses intact. Assessment:       Diagnosis Orders   1. HTN (hypertension), benign  At goal < 130/80  Continue lopressor and norvasc   2. Mixed hyperlipidemia  LDL at goal < 70. Continue Zocor   3. Pancytopenia (HCC)  Stable on hydroxyurea; continue with Dr. Vickey Sorto   4.  Age-related cognitive decline  Discussed diet, exercise, social engagement, brain games. Pros and cons of Aricept addressed    5. CAD - stable. Continue diet, exercise, BP and lipid control. Continue cardiology follow up   Plan:      .Side effects of current medications reviewed and questions answered. Follow up in 6 months or prn.

## 2020-11-04 ENCOUNTER — HOSPITAL ENCOUNTER (OUTPATIENT)
Dept: CARDIAC CATH/INVASIVE PROCEDURES | Age: 80
Discharge: HOME | End: 2020-11-04
Attending: INTERNAL MEDICINE | Admitting: INTERNAL MEDICINE
Payer: MEDICARE

## 2020-11-04 VITALS
SYSTOLIC BLOOD PRESSURE: 165 MMHG | HEIGHT: 68 IN | BODY MASS INDEX: 24.25 KG/M2 | DIASTOLIC BLOOD PRESSURE: 79 MMHG | RESPIRATION RATE: 16 BRPM | OXYGEN SATURATION: 97 % | WEIGHT: 160 LBS | TEMPERATURE: 98.1 F | HEART RATE: 62 BPM

## 2020-11-04 LAB
ANION GAP SERPL CALCULATED.3IONS-SCNC: 10 MMOL/L (ref 3–16)
BUN BLDV-MCNC: 24 MG/DL (ref 7–20)
CALCIUM SERPL-MCNC: 9.2 MG/DL (ref 8.3–10.6)
CHLORIDE BLD-SCNC: 105 MMOL/L (ref 99–110)
CO2: 26 MMOL/L (ref 21–32)
CREAT SERPL-MCNC: 0.7 MG/DL (ref 0.8–1.3)
GFR AFRICAN AMERICAN: >60
GFR NON-AFRICAN AMERICAN: >60
GLUCOSE BLD-MCNC: 106 MG/DL (ref 70–99)
HCT VFR BLD CALC: 43.5 % (ref 40.5–52.5)
HEMOGLOBIN: 14.5 G/DL (ref 13.5–17.5)
MCH RBC QN AUTO: 34.3 PG (ref 26–34)
MCHC RBC AUTO-ENTMCNC: 33.3 G/DL (ref 31–36)
MCV RBC AUTO: 103 FL (ref 80–100)
PDW BLD-RTO: 14.4 % (ref 12.4–15.4)
PLATELET # BLD: 338 K/UL (ref 135–450)
PMV BLD AUTO: 8.5 FL (ref 5–10.5)
POTASSIUM SERPL-SCNC: 3.7 MMOL/L (ref 3.5–5.1)
RBC # BLD: 4.22 M/UL (ref 4.2–5.9)
SODIUM BLD-SCNC: 141 MMOL/L (ref 136–145)
WBC # BLD: 6.9 K/UL (ref 4–11)

## 2020-11-04 PROCEDURE — 85027 COMPLETE CBC AUTOMATED: CPT

## 2020-11-04 PROCEDURE — 2580000003 HC RX 258

## 2020-11-04 PROCEDURE — 80048 BASIC METABOLIC PNL TOTAL CA: CPT

## 2020-11-04 PROCEDURE — 2500000003 HC RX 250 WO HCPCS

## 2020-11-04 PROCEDURE — 99152 MOD SED SAME PHYS/QHP 5/>YRS: CPT

## 2020-11-04 PROCEDURE — 33263 RMVL & RPLCMT DFB GEN 2 LEAD: CPT

## 2020-11-04 PROCEDURE — 2780000010 HC IMPLANT OTHER

## 2020-11-04 PROCEDURE — C1721 AICD, DUAL CHAMBER: HCPCS

## 2020-11-04 PROCEDURE — 99152 MOD SED SAME PHYS/QHP 5/>YRS: CPT | Performed by: INTERNAL MEDICINE

## 2020-11-04 PROCEDURE — 6360000002 HC RX W HCPCS

## 2020-11-04 PROCEDURE — 99153 MOD SED SAME PHYS/QHP EA: CPT

## 2020-11-04 PROCEDURE — 93010 ELECTROCARDIOGRAM REPORT: CPT | Performed by: INTERNAL MEDICINE

## 2020-11-04 PROCEDURE — 33263 RMVL & RPLCMT DFB GEN 2 LEAD: CPT | Performed by: INTERNAL MEDICINE

## 2020-11-04 PROCEDURE — 36415 COLL VENOUS BLD VENIPUNCTURE: CPT

## 2020-11-04 PROCEDURE — 93005 ELECTROCARDIOGRAM TRACING: CPT | Performed by: INTERNAL MEDICINE

## 2020-11-04 NOTE — PROCEDURES
[address]     Electrophysiology Procedure Note       Date of Procedure: 11/4/2020  Patient's Name: Domingo Barnes  YOB: 1940   Medical Record Number: [id number]  Referring Physician: Sheila De Leon MD  Procedure Performed by: Sheila De Leon MD    Procedures performed:    · Generator change out of dual chamber AICD  · Electronic analysis of lead and device. · IV sedation. · Sedation start time: 12:18  · Sedation stop time: 13:14     Indication of the procedure: AICD battery depletion  Domingo Barnes is a 79 y.o. male with history of ventricular tachycardia s/p AICD implantation. He initially presented to the hospital in September 2014 with several episodes of pre-syncopal episodes while driving. He underwent a cardiac catheterization which showed non-obstructive CAD and echo revealed moderate LVH and diastolic dysfunction. Later he had documented symptomatic ventricular tachycardia. His device is KRISTINA and needs replacement. Details of procedure: The risks, benefits and alternatives of the procedure were discussed with the patient. The risks including, but not limited to, the risks of bleeding, infection, pain, device malfunction, lead dislodgement, injury to cardiac and surrounding structures, stroke, and death were discussed in detail. The patient opted to proceed with the device implantation. Written informed consent was signed and placed in the chart. Patient was brought to the electrophysiology lab in a fasting nonsedated state. Prophylactic antibiotic was given. Chest was prepped and draped in the usual sterile fashion. After injection of 2% lidocaine in the left pectoral area, an incision was made over the previous scar and extended to the pocket using electrocautery and blunt dissection. The old device was removed. The leads were tested and showed stable parameters.  The pocket was vigorously irrigated with antibiotic solution and the leads were connected to the new pulse generator device which was implanted into the clean pocket. The pulse generator was sutured inside the pocket. TYRX was used to reduce risk of infection. The pocket was then closed in three separate subcutaneous layers using 2-0 & 3-0 Vicryl and subcuticular layer using 4-0 Vicryl. The skin was covered with Steri- strips and pressure dressing. All sponge and needle counts were reported as correct at the end of the procedure. The patient tolerated the procedure well and there were no complications. The patient tolerated the procedure well and there were no complications. Post-sedation evaluation was completed. Patient was transported to the holding area in stable condition. EBL less than 20 ml. DEVICE and LEAD information:    The device is [employer] # EVERA MRI XT  with SN# [id number] implant day 11/4/2020   Atrial lead: 5076 with serial number: [id number] implant day . 10/10/2012  RV lead: 2891 with serial number: [id number] implant day 10/10/2012      Atrial lead: Impedance: 456 Pacing threshold: 0.75 @ 0.4 ms sensing 3.4 mV  RV lead: Impedance: 399 Pacing threshold: 1.5 @ 0.4 ms sensing 10.1 mV  HVB impedance: 44     Plan:   The patient will have usual post-implant care. Patient can be discharged home if remains stable with follow up in arrhythmia clinic.

## 2020-11-04 NOTE — H&P
[facility]   Electrophysiology Followup    Referring Provider:  Mana Aguilar MD     CC: VT     History of Present Illness:  Jamison Waters is 79 y.o. male history of ventricular tachycardia s/p AICD implantation. He initially presented to the hospital in September 2014 with several episodes of pre-syncopal episodes while driving. He underwent a cardiac catheterization which showed non-obstructive CAD and echo revealed moderate LVH and diastolic dysfunction. Later he had documented symptomatic ventricular tachycardia. Continues to work full time as a . 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 He also is treated with Sotalol. He was recently hospitalized 7/2020 for pancytopenia secondary to hydroxyurea. He is following with hematology. Denies having any chest pain or shortness of breath. Device interrogation today with battery KRISTINA. 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 Surgical History: has a past surgical history that includes Splenectomy; chest tube insertion; other surgical history (10/2012); Cardiac defibrillator placement (10/10/12); and Cataract removal with implant (08/2018). Social History: reports that he quit smoking about 29 years ago. He started smoking about 62 years ago. He has a 52.50 pack-year smoking history. He has never used smokeless tobacco. He reports that he does not drink alcohol or use drugs. Family History:family history includes Alzheimer's Disease in his father; Cancer in his brother, brother, and mother. No current facility-administered medications for this encounter. Allergies:  Patient has no known allergies. DATA:  Labs reviewed  Lab Results   Component Value Date    TSH 3.840 07/02/2020    TSH 4.060 12/06/2019    CREATININE 0.8 07/08/2020    CREATININE 1.0 07/06/2020    AST 13 07/06/2020    ALT 13 07/06/2020       Diagnostic testing:  Pertinent reports were reviewed as a part of this visit. Last Echo: 9/29/12 This is a technically adequate echocardiography study. Left ventricular size is normal. There was moderate left ventricular hypertrophy. Left ventricular systolic function appears  to be normal. Ejection fraction is estimated at 60%. There was evidence of Grade 1 diastolic dysfunction. Right ventricular size appears to be normal. Right ventricular  systolic function appears to be normal. Left atrial size is normal. The right atrial size appears to be normal.  Aortic root size measurements are with normal range. The aortic valve is trileaflet. Leaflets were mildly thickened. There was no evidence of aortic stenosis. No significant aortic  regurgitation was noted. Mitral valve leaflets were mildly thickened. There was mild mitral regurgitation. The tricuspid valve is structurally normal. Trivial tricuspid   regurgitation is noted. Right ventricular systolic pressure cannot be estimated based on this study. The pulmonic valve was poorly seen. No significant pericardial effusion was noted. LHC 9/28/12 EF 60%, WM normal,   LM nl, LAD nl, LAD nl, Diag2 prox 20%, circ nl, OM1 ostial 30%, RCA LI    ECG 9/8/20  Sinus Rhythm      Review of System:  All other systems reviewed and are negative except for that noted above.  Pertinent negatives are:     · General: negative for fever, chills   · Ophthalmic ROS: negative for - eye pain or loss of vision  · ENT ROS: negative for - headaches, sore throat   · Respiratory: negative for - cough, sputum  · Cardiovascular: Reviewed in HPI  · Gastrointestinal: negative for - abdominal pain, diarrhea, N/V  · Hematology: negative for - bleeding, blood clots, bruising or jaundice  · Genito-Urinary:  negative for - Dysuria or incontinence  · Musculoskeletal: negative for - Joint swelling, muscle pain  · Neurological: negative for - confusion, dizziness, headaches   · Psychiatric: No anxiety, no depression. · Dermatological: negative for - rash    Physical Examination:    Vitals:    11/04/20 1148   BP: (!) 165/79   Pulse: 62   Resp: 16   Temp: 98.1 °F (36.7 °C)   SpO2: 97%       Wt Readings from Last 3 Encounters:   11/04/20 160 lb (72.6 kg)   10/21/20 160 lb (72.6 kg)   09/08/20 154 lb (69.9 kg)     · Constitutional: Oriented. No distress. · Head: Normocephalic and atraumatic. · Mouth/Throat: Oropharynx is clear and moist.   · Eyes: Conjunctivae normal. EOM are normal.   · Neck: Neck supple. No JVD present. · Cardiovascular: Normal rate, regular rhythm, S1&S2. · Pulmonary/Chest: Bilateral respiratory sounds. No rhonchi. · Abdominal: Soft. No tenderness. · Musculoskeletal: No tenderness. No edema    · Lymphadenopathy: Has no cervical adenopathy. · Neurological: Alert and oriented. Follows command, No Gross deficit   · Skin: Skin is warm, No rash noted. · Psychiatric: Has a normal behavior       Assessment:      Ventricular tachycardia s/p AICD:    Device interrogation with battery KRISTINA. He has history of ventricular tachycardia and the device has been implanted for secondary prevention. He is on sotalol. NYHAFC: I      Sotalol 80 mg bid    normal.     Continue medical therapy    Dual chamber defibrillator in place -  The CIED was interrogated and programmed and I supervised and reviewed all the data. All findings and changes are in device interrogation sheat and reflect my personal interpretation and changes and is scanned to Epic.      We will schedule him for Gen change    The risks, benefits and alternatives of the procedure were discussed with the patient. The risks including, but not limited to, the risks of bleeding, infection, pain, device malfunction, lead dislodgement, radiation exposure, injury to cardiac and surrounding structures (including pneumothorax), stroke, cardiac perforation, tamponade, need for emergent heart surgery, myocardial infarction and death were discussed in detail. Dual vs single chamber device, including risks and benefits were discussed with patient. Mild non-obstructive CAD (coronary artery disease) -    -9/28/12 LHC: EF 60%, WM normal, LM nl, LAD nl, LAD nl, Diag2 prox 20%, circ nl, OM1 ostial 30%, RCA LI.    -Stable. -ASA 81 mg daily   -Simvastatin 20 mg daily   -Metoprolol 12.5 mg twice a day   -Losartan 100 mg daily    Hyperlipidemia -   Simvastatin. HTN:      Vitals:    11/04/20 1148   BP: (!) 165/79   Pulse: 62   Resp: 16   Temp: 98.1 °F (36.7 °C)   SpO2: 97%      Attests to having white coat HTN   Home BP monitoring encourage with a BP goal <130/80    - The patient is counseled to follow a low salt diet to assure blood pressure remains controlled for cardiovascular risk factor modification.   - The patient is counseled to avoid excess caffeine, and energy drinks as this may exacerbated ectopy and arrhythmia. - The patient is counseled to get regular exercise 3-5 times per week to control cardiovascular risk factors. NOTE: This report was transcribed using voice recognition software. Every effort was made to ensure accuracy, however, inadvertent computerized transcription errors may be present. Loni Mohs, MD, MPH  [facility]   Office: [phone]    H&P Update    I have reviewed the history and physical and examined the patient and updated with relevant changes.      Consent: I have discussed with the patient and/or the patient representative the indication, alternatives, and the possible risks and/or complications of the planned procedure and the specific antigen (PSA) 4/22/2011    Essential hypertension, benign 4/22/2011    Mononucleosis age 49    caused spleen to rupture    Pneumothorax     Pure hypercholesterolemia 4/22/2011    Spinal stenosis of lumbar region without neurogenic claudication 10/31/2017    Mild to moderate L4-5    Ventricular tachycardia ([address] 10/2012     Past Surgical History:   Procedure Laterality Date    CARDIAC DEFIBRILLATOR PLACEMENT  10/10/12    dual chamber ICD Med[employer]onic    CATARACT REMOVAL WITH IMPLANT  08/2018    CHEST TUBE INSERTION      OTHER SURGICAL HISTORY  10/2012    insertion of cardiac recorder    SPLENECTOMY       No Known Allergies    Pre-Sedation Documentation and Exam:   I have personally completed a history, physical exam & review of systems for this patient (see notes).     Mallampati Airway Assessment:  Class I     Prior History of Anesthesia Complications:   None    ASA Classification:  Class 3 - A patient with severe systemic disease that limits activity but is not incapacitating    Sedation/ Anesthesia Plan:   Intravenous sedation    Medications Planned:   Midazolam (Versed) and Fentanyl intravenously    Patient is an appropriate candidate for plan of sedation:   Yes    Electronically signed by Brittaney Monae MD on 11/4/2020 at 11:58 AM

## 2020-11-05 LAB
EKG ATRIAL RATE: 62 BPM
EKG DIAGNOSIS: NORMAL
EKG P AXIS: 15 DEGREES
EKG P-R INTERVAL: 140 MS
EKG Q-T INTERVAL: 444 MS
EKG QRS DURATION: 94 MS
EKG QTC CALCULATION (BAZETT): 450 MS
EKG R AXIS: -16 DEGREES
EKG T AXIS: 28 DEGREES
EKG VENTRICULAR RATE: 62 BPM

## 2020-11-12 ENCOUNTER — NURSE ONLY (OUTPATIENT)
Dept: CARDIOLOGY CLINIC | Age: 80
End: 2020-11-12
Payer: MEDICARE

## 2020-11-12 PROCEDURE — 93283 PRGRMG EVAL IMPLANTABLE DFB: CPT | Performed by: INTERNAL MEDICINE

## 2020-11-12 NOTE — PROGRESS NOTES
Patient comes in for programming evaluation for his defibrillator. All sensing and pacing parameters are within normal range. Gen Change on 11/4/2020  Battery life 10.7 years  AP 15%.  <0.1%. No episodes noted. Patient remains on metoprolol and sotalol. Patients incision is healing nicely. Patient to call the office immediately with any signs on infection. No changes need to be made at this time. Please see interrogation for more detail. Optivol is reinitializing. Patient will follow up in 3 months in office or remotely.

## 2020-11-16 RX ORDER — DONEPEZIL HYDROCHLORIDE 5 MG/1
TABLET, FILM COATED ORAL
Qty: 30 TABLET | Refills: 2 | OUTPATIENT
Start: 2020-11-16

## 2020-12-08 ENCOUNTER — NURSE ONLY (OUTPATIENT)
Dept: CARDIOLOGY CLINIC | Age: 80
End: 2020-12-08
Payer: MEDICARE

## 2020-12-08 PROCEDURE — 93296 REM INTERROG EVL PM/IDS: CPT | Performed by: INTERNAL MEDICINE

## 2020-12-08 PROCEDURE — 93295 DEV INTERROG REMOTE 1/2/MLT: CPT | Performed by: INTERNAL MEDICINE

## 2020-12-08 NOTE — LETTER
[facility] [phone]  [address] [place] [address]    Pacemaker/Defibrillator Clinic          12/08/20        Kinga Heller  [address] [place] [id number]        Dear Kinga Heller    This letter is to inform you that we received the transmission from your monitor at home that checks your implanted heart device. The next date your monitor will automatically transmit will be 5-17-21. If your report needs attention we will notify you. Your device and monitor are wireless and most transmit cellularly, but please periodically check your monitor is still plugged in to the electrical outlet. If you still use the telephone land line to send please ensure the connection to the phone yaron is secure. This will help to ensure successful automatic transmissions in the future. Also, the monitor needs to be close to you while sleeping at night. Please be aware that the remote device transmission sites are periodically monitored only during regular business hours during which simultaneous in-office device clinics are being run. If your transmission requires attention, we will contact you as soon as possible. Thank you.             [facility]

## 2020-12-08 NOTE — PROGRESS NOTES
We received remote transmission from patient's monitor at home. Transmission shows normal sensing and pacing function. EP physician will review. See interrogation under cardiology tab in the [facility] [address] field for more details. Optivol is within normal range.

## 2021-01-25 ENCOUNTER — TELEPHONE (OUTPATIENT)
Dept: FAMILY MEDICINE CLINIC | Age: 81
End: 2021-01-25

## 2021-01-25 NOTE — TELEPHONE ENCOUNTER
Patient calling requesting medication refill, please advise.        losartan (COZAAR) 100 MG tablet [[id number]]     Order Details  Dose, Route, Frequency: As Directed   Dispense Quantity: 90 tablet Refills: 3          Sig: TAKE 1 TABLET DAILY         Start Date: 11/25/20 End Date: --   Written Date: 11/25/20 Expiration Date: 11/25/21   Original Order:  losartan (COZAAR) 100 MG tablet [[id number]]   Providers    Authorizing Provider: Juarez Alfredo MD NPI: [id number]   Ordering User:  Juarez Alfredo MD        Pharmacy    [facility] - [facility], [phone] - F [phone]   [address] [address]   Phone:  [phone]  Fax:  [phone]

## 2021-03-01 NOTE — PROGRESS NOTES
[facility], [place], operated by [facility]   Electrophysiology Follow up   Date: 3/1/2021  I had the privilege of visiting Liang Castillo in the office. CC: Hx of VT   HPI: Liang Castillo is a [de-identified] y.o. male history of ventricular tachycardia s/p AICD implantation. He initially presented to the hospital in September 2014 with several episodes of pre-syncopal episodes while driving. He underwent a cardiac catheterization which showed non-obstructive CAD and echo revealed moderate LVH and diastolic dysfunction. Later he had documented symptomatic ventricular tachycardia. Continues to work full time as a .     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 He also is treated with Sotalol.      He was recently hospitalized 7/2020 for pancytopenia secondary to hydroxyurea. He is following with hematology. Denies having any chest pain or shortness of breath. S/p ICD gen change 11/4/2020    Joni Fatima presents to the office in follow up. No arrhythmia noted on device interrogation. He is doing well from a cardiac standpoint. His device implantation site has healed well and is free from s/s of infection. Assessment and plan:       Ventricular tachycardia s/p AICD:    No VT on today's interrogation              He has history of ventricular tachycardia and the device has been implanted for secondary prevention. NYHAFC: I               Sotalol 80 mg bid              QTcH 430              Continue medical therapy    Dual chamber defibrillator in place -  The CIED was interrogated and programmed and I supervised and reviewed all the data. All findings and changes are in device interrogation sheat and reflect my personal interpretation and changes and is scanned to Epic.    AP 19.1%  <0.1%  10 years remaining battery, No VT on device        Mild non-obstructive CAD (coronary artery disease) -               -9/28/12 LHC: EF 60%, WM normal, LM nl, LAD nl, LAD nl, Diag2 prox 20%, circ nl, OM1 ostial 30%, RCA LI.               -Stable. -ASA 81 mg daily              -Simvastatin 20 mg daily              -Metoprolol 12.5 mg twice a day              -Losartan 100 mg daily     Hyperlipidemia -              Simvastatin.      HTN  -Not well controlled but attests to White coat HTN  -BP goal <130/80  -Home BP monitoring encouraged, printed information provided on how to accurately measure BP at home.  -Counseled to follow a low salt diet to assure blood pressure remains controlled for cardiovascular risk factor modification.   -The patient is counseled to get regular exercise 3-5 times per week and maintain a healthy weight reduce cardiovascular risk factors. Patient Active Problem List    Diagnosis Date Noted    Implantable cardioverter-defibrillator (ICD) at end of battery life     Age-related cognitive decline 10/21/2020    Vitamin B12 deficiency disease 07/23/2020    Dyslipidemia     [address]     TIA (transient ischemic attack) 07/06/2020    Cortical age-related cataract of both eyes 08/06/2018    Spinal stenosis of lumbar region without neurogenic claudication 10/31/2017    Hypomagnesemia 05/13/2014    CAD (coronary artery disease) 12/04/2012    Automatic implantable cardioverter-defibrillator in situ 10/17/2012    VT (ventricular tachycardia) ([facility] 09/28/2012    HTN (hypertension), benign 04/22/2011    Hypertrophy of prostate without urinary obstruction and other lower urinary tract symptoms (LUTS) 04/22/2011    Occlusion and stenosis of carotid artery 04/22/2011     Diagnostic studies:   ECG 3/1/21  Sinus rhythm     Last Echo: 9/29/12 This is a technically adequate echocardiography study. Left ventricular size is normal. There was moderate left ventricular hypertrophy. Left ventricular systolic function appears  to be normal. Ejection fraction is estimated at 60%. There was evidence of Grade 1 diastolic dysfunction.  Right ventricular size appears to be normal. Right ventricular  systolic function appears to be normal. Left atrial size is normal. The right atrial size appears to be normal.  Aortic root size measurements are with normal range. The aortic valve is trileaflet. Leaflets were mildly thickened. There was no evidence of aortic stenosis. No significant aortic  regurgitation was noted. Mitral valve leaflets were mildly thickened. There was mild mitral regurgitation. The tricuspid valve is structurally normal. Trivial tricuspid   regurgitation is noted. Right ventricular systolic pressure cannot be estimated based on this study. The pulmonic valve was poorly seen. No significant pericardial effusion was noted.       LHC 9/28/12 EF 60%, WM normal,   LM nl, LAD nl, LAD nl, Diag2 prox 20%, circ nl, OM1 ostial 30%, RCA SIMEON OLMSTEAD independently reviewed the cardiac diagnostic studies, ECG and relevant imaging studies. Lab Results   Component Value Date    LVEF 55 07/07/2020    LVEFMODE Echo 07/07/2020     Lab Results   Component Value Date    TSH 3.840 07/02/2020         Physical Examination:  There were no vitals filed for this visit. Wt Readings from Last 3 Encounters:   11/04/20 160 lb (72.6 kg)   10/21/20 160 lb (72.6 kg)   09/08/20 154 lb (69.9 kg)       · Constitutional: Oriented. No distress. · Head: Normocephalic and atraumatic. · Mouth/Throat: Oropharynx is clear and moist.   · Eyes: Conjunctivae normal. EOM are normal.   · Neck: Neck supple. No JVD present. · Cardiovascular: Normal rate, regular rhythm, S1&S2. · Pulmonary/Chest: Bilateral respiratory sounds. No rhonchi. · Abdominal: Soft. No tenderness. · Musculoskeletal: No tenderness. No edema    · Lymphadenopathy: Has no cervical adenopathy. · Neurological: Alert and oriented. Follows command, No Gross deficit   · Skin: Skin is warm, No rash noted.    · Psychiatric: Has a normal behavior       Review of System:  [x] Full ROS obtained and negative except as mentioned in HPI    Prior to Admission medications    Medication Sig Start Date End Date Taking? Authorizing Provider   losartan (COZAAR) 100 MG tablet Take 1 tablet by mouth daily TAKE 1 TABLET DAILY 1/27/21   Juarez Alfredo MD   simvastatin (ZOCOR) 20 MG tablet TAKE 1 TABLET EVERY EVENING 11/25/20   Juarez Alfredo MD   amLODIPine (NORVASC) 5 MG tablet TAKE 1 TABLET BY MOUTH EVERY DAY 9/8/20   Juarez Alfredo MD   hydroxyurea (HYDREA) 500 MG chemo capsule Take by mouth Monday, Wednesday, Friday 2/7/20   Historical Provider, MD   vitamin B-12 (CYANOCOBALAMIN) 100 MCG tablet Take 50 mcg by mouth daily    Historical Provider, MD   Cholecalciferol (VITAMIN D) 50 MCG (2000 UT) CAPS capsule Take by mouth    Historical Provider, MD   donepezil (ARICEPT) 5 MG tablet Take 1 tablet by mouth daily 7/23/20   Niurka Perez MD   Multiple Vitamins-Minerals (MULTIVITAMIN ADULT PO) Take by mouth    Historical Provider, MD   metoprolol tartrate (LOPRESSOR) 25 MG tablet Take 0.5 tablets by mouth 2 times daily 2/28/20   Darrall JOSE Lutz - CNP   sotalol (BETAPACE) 80 MG tablet TAKE 1 TABLET BY MOUTH TWICE DAILY 2/28/20   Darrall PuJOSE culp - CNP   magnesium oxide (MAG-OX) 400 MG tablet Take 1 tablet by mouth daily. Patient taking differently:   Take 250 mg by mouth daily  10/17/12   JOSE Matta CNP   aspirin 81 MG EC tablet Take 81 mg by mouth daily. Historical Provider, MD       No Known Allergies    Social History:  Reviewed. reports that he quit smoking about 30 years ago. He started smoking about 63 years ago. He has a 52.50 pack-year smoking history. He has never used smokeless tobacco. He reports that he does not drink alcohol or use drugs. Family History:  Reviewed. Reviewed. No family history of SCD. Relevant and available labs, and cardiovascular diagnostics reviewed. Reviewed.      I independently reviewed relevant and available cardiac diagnostic tests ECG, CXR, Echo, Stress test, Device interrogation, Holter, CT scan. All questions and concerns were addressed to the patient/family. Alternatives to my treatment were discussed. I have discussed the above stated plan and the patient verbalized understanding and agreed with the plan. Scribe attestation: This note was scribed in the presence of Monica Ferro MD by Dick Javed RN  Physician Attestation: I, Dr. Monica Ferro, confirm that the scribe's documentation has been prepared under my direction and personally reviewed by me in its entirety. I also confirm that the note above accurately reflects all work, treatment, procedures, and medical decision making performed by me. NOTE: This report was transcribed using voice recognition software. Every effort was made to ensure accuracy, however, inadvertent computerized transcription errors may be present.      Monica Ferro MD, MPH  [facility]   Office: [phone]  Fax: [phone]

## 2021-03-02 ENCOUNTER — OFFICE VISIT (OUTPATIENT)
Dept: CARDIOLOGY CLINIC | Age: 81
End: 2021-03-02
Payer: MEDICARE

## 2021-03-02 ENCOUNTER — NURSE ONLY (OUTPATIENT)
Dept: CARDIOLOGY CLINIC | Age: 81
End: 2021-03-02
Payer: MEDICARE

## 2021-03-02 VITALS
HEIGHT: 68 IN | WEIGHT: 164 LBS | DIASTOLIC BLOOD PRESSURE: 92 MMHG | OXYGEN SATURATION: 95 % | BODY MASS INDEX: 24.86 KG/M2 | SYSTOLIC BLOOD PRESSURE: 147 MMHG | HEART RATE: 65 BPM

## 2021-03-02 DIAGNOSIS — Z95.810 AUTOMATIC IMPLANTABLE CARDIOVERTER-DEFIBRILLATOR IN SITU: Chronic | ICD-10-CM

## 2021-03-02 DIAGNOSIS — I47.20 VT (VENTRICULAR TACHYCARDIA): ICD-10-CM

## 2021-03-02 DIAGNOSIS — I25.10 CORONARY ARTERY DISEASE INVOLVING NATIVE CORONARY ARTERY OF NATIVE HEART WITHOUT ANGINA PECTORIS: ICD-10-CM

## 2021-03-02 DIAGNOSIS — I10 HTN (HYPERTENSION), BENIGN: Primary | Chronic | ICD-10-CM

## 2021-03-02 PROCEDURE — 93283 PRGRMG EVAL IMPLANTABLE DFB: CPT | Performed by: INTERNAL MEDICINE

## 2021-03-02 PROCEDURE — 99214 OFFICE O/P EST MOD 30 MIN: CPT | Performed by: INTERNAL MEDICINE

## 2021-03-08 RX ORDER — SOTALOL HYDROCHLORIDE 80 MG/1
TABLET ORAL
Qty: 180 TABLET | Refills: 3 | Status: SHIPPED | OUTPATIENT
Start: 2021-03-08 | End: 2022-03-08

## 2021-03-08 NOTE — TELEPHONE ENCOUNTER
[facility]     Last ov:3/2/2021 RMM    Last EKG:3/2/2021    Last Refill: metoprolol #90 with 3 refills 2/28/2020, Sotalol #180 with 3 refills 2/28/2020    Next appointment:on recall list for NPAL or NPSR

## 2021-04-20 PROBLEM — D75.839 THROMBOCYTOSIS: Status: ACTIVE | Noted: 2021-04-20

## 2021-04-21 ENCOUNTER — OFFICE VISIT (OUTPATIENT)
Dept: FAMILY MEDICINE CLINIC | Age: 81
End: 2021-04-21
Payer: MEDICARE

## 2021-04-21 VITALS
TEMPERATURE: 96.8 F | DIASTOLIC BLOOD PRESSURE: 62 MMHG | RESPIRATION RATE: 16 BRPM | HEART RATE: 60 BPM | BODY MASS INDEX: 24.63 KG/M2 | OXYGEN SATURATION: 95 % | WEIGHT: 162 LBS | SYSTOLIC BLOOD PRESSURE: 132 MMHG

## 2021-04-21 DIAGNOSIS — D75.839 THROMBOCYTOSIS: ICD-10-CM

## 2021-04-21 DIAGNOSIS — I10 HTN (HYPERTENSION), BENIGN: Primary | Chronic | ICD-10-CM

## 2021-04-21 DIAGNOSIS — Z23 NEED FOR VACCINATION: ICD-10-CM

## 2021-04-21 DIAGNOSIS — I10 ESSENTIAL HYPERTENSION, BENIGN: Chronic | ICD-10-CM

## 2021-04-21 DIAGNOSIS — Z90.81 ASPLENIA AFTER SURGICAL PROCEDURE: ICD-10-CM

## 2021-04-21 DIAGNOSIS — R41.81 AGE-RELATED COGNITIVE DECLINE: ICD-10-CM

## 2021-04-21 DIAGNOSIS — I25.10 CORONARY ARTERY DISEASE INVOLVING NATIVE CORONARY ARTERY OF NATIVE HEART WITHOUT ANGINA PECTORIS: ICD-10-CM

## 2021-04-21 DIAGNOSIS — E78.5 DYSLIPIDEMIA: ICD-10-CM

## 2021-04-21 DIAGNOSIS — Z95.810 AUTOMATIC IMPLANTABLE CARDIOVERTER-DEFIBRILLATOR IN SITU: Chronic | ICD-10-CM

## 2021-04-21 PROCEDURE — 90472 IMMUNIZATION ADMIN EACH ADD: CPT | Performed by: FAMILY MEDICINE

## 2021-04-21 PROCEDURE — 99214 OFFICE O/P EST MOD 30 MIN: CPT | Performed by: FAMILY MEDICINE

## 2021-04-21 PROCEDURE — 90620 MENB-4C VACCINE IM: CPT | Performed by: FAMILY MEDICINE

## 2021-04-21 PROCEDURE — 90471 IMMUNIZATION ADMIN: CPT | Performed by: FAMILY MEDICINE

## 2021-04-21 PROCEDURE — 90734 MENACWYD/MENACWYCRM VACC IM: CPT | Performed by: FAMILY MEDICINE

## 2021-04-21 PROCEDURE — 90648 HIB PRP-T VACCINE 4 DOSE IM: CPT | Performed by: FAMILY MEDICINE

## 2021-04-21 RX ORDER — AMLODIPINE BESYLATE 10 MG/1
10 TABLET ORAL DAILY
Qty: 90 TABLET | Refills: 1 | Status: SHIPPED | OUTPATIENT
Start: 2021-04-21 | End: 2021-06-28

## 2021-04-21 ASSESSMENT — PATIENT HEALTH QUESTIONNAIRE - PHQ9
SUM OF ALL RESPONSES TO PHQ QUESTIONS 1-9: 0
1. LITTLE INTEREST OR PLEASURE IN DOING THINGS: 0
2. FEELING DOWN, DEPRESSED OR HOPELESS: 0

## 2021-04-21 NOTE — PROGRESS NOTES
Subjective:      Patient ID: Kin Lanza [de-identified] y.o. male. The primary encounter diagnosis was HTN (hypertension), benign. Diagnoses of Dyslipidemia, Coronary artery disease involving native coronary artery of native heart without angina pectoris, Automatic implantable cardioverter-defibrillator in situ, Age-related cognitive decline, and Thrombocytosis ([facility] [address] were also pertinent to this visit. HPI    Asplenia:  Due Meningicoccal ACWY, Meningococcal B, HIB. Also due Shingrix. Memory loss: At last visit 6 months ago there was concern for memory loss:   Tiffani Madrid was hospitalized in July with low plts due to hydroxyurea. He sees Dr. Mariusz Roche for thrombocytosis. He had an acute encephalopathy. He saw Dr. Salvador Floyd in follow up. He and his wife gave a hx of memory problems and he was diagnosed with Alzheimer's verses age related memory loss. His father had Alzheimer's disease. He was started on Aricept 5 mg. He cancelled follow up with Dr. Salvador Floyd. He felt the visit was good. He says DR. Salazar did not think \"I had a problem\". He stopped the Aricept - did not think it helped and he did not want to take so many medications. He does not feel he has any memory problems but his wife thinks he has memory loss. She says he has long term but no short term memory loss. He has not stopped doing anything due to memory problems. Pays bills, drives, etc with no problem. Today: he feels he is fine; his wife notes he has trouble remembering things he did a few weeks ago. He still pays all the bills without problem, keeps good financial records. No confusion. independent in all ADLS. He exercises and eats very well. Thrombocytosis: sees Dr. Mariusz Roche. Last visit 12/2020. Hydroxyurea was increased to 5 a day. Recently he notes a bit more shortness of breath like he had when he needed a transfusion. No increase in bruising. Hypertension/ HTN/AICD:  Sees Dr Ian Acevedo.   Last visit 3/2/2021: Patient here for follow-up of elevated blood pressure. He is exercising and is adherent to low salt diet. Blood pressure is well controlled at home. Cardiac symptoms none. Patient denies chest pressure/discomfort, dyspnea, exertional chest pressure/discomfort, lower extremity edema, orthopnea, palpitations and paroxysmal nocturnal dyspnea. Cardiovascular risk factors: dyslipidemia, hypertension and male gender. Use of agents associated with hypertension: none. History of target organ damage: CAD. Hyperlipidemia:  No new myalgias or GI upset on simvastatin (Zocor). Medication compliance: compliant most of the time. Patient is  following a low fat, low cholesterol diet. He is  exercising regularly. Had CBC ( no anemia, plts 482 , electrolytes (normal) and LFTS ( normal) at [facility] by Dr. Nuria Redd 3/12/21.     Lab Results   Component Value Date    CHOL 126 07/07/2020    TRIG 154 (H) 07/07/2020    HDL 29 (L) 07/07/2020    LDLCALC 66 07/07/2020     Lab Results   Component Value Date    ALT 13 07/06/2020    AST 13 (L) 07/06/2020        Labs Renal Latest Ref Rng & Units 11/4/2020 7/8/2020 7/6/2020 7/2/2020 12/6/2019   BUN 7 - 20 mg/dL 24(H) 18 19 22 17   Cr 0.8 - 1.3 mg/dL 0.7(L) 0.8 1.0 0.99 0.91   K 3.5 - 5.1 mmol/L 3.7 3.8 3.8 4.1 4.9   Na 136 - 145 mmol/L 141 140 138 140 140     TSH   Date Value Ref Range Status   07/02/2020 3.840 0.450 - 4.500 uIU/mL Final   12/06/2019 4.060 0.450 - 4.500 uIU/mL Final   08/08/2018 2.870 0.450 - 4.500 uIU/mL Final     Lab Results   Component Value Date    WBC 6.9 11/04/2020    HGB 14.5 11/04/2020    HCT 43.5 11/04/2020    .0 (H) 11/04/2020     11/04/2020         Outpatient Medications Marked as Taking for the 4/21/21 encounter (Office Visit) with Silvana Buenrostro MD   Medication Sig Dispense Refill    sotalol (BETAPACE) 80 MG tablet TAKE 1 TABLET TWICE A  tablet 3    metoprolol tartrate (LOPRESSOR) 25 MG tablet TAKE 1/2 TABLET TWICE A DAY 90 tablet 3    losartan (COZAAR) 100 MG tablet Take 1 tablet by mouth daily TAKE 1 TABLET DAILY 90 tablet 1    simvastatin (ZOCOR) 20 MG tablet TAKE 1 TABLET EVERY EVENING 90 tablet 3    amLODIPine (NORVASC) 5 MG tablet TAKE 1 TABLET BY MOUTH EVERY DAY 90 tablet 1    hydroxyurea (HYDREA) 500 MG chemo capsule Take by mouth 5 days a week      vitamin B-12 (CYANOCOBALAMIN) 100 MCG tablet Take 50 mcg by mouth daily      Cholecalciferol (VITAMIN D) 50 MCG (2000 UT) CAPS capsule Take by mouth      Multiple Vitamins-Minerals (MULTIVITAMIN ADULT PO) Take by mouth      magnesium oxide (MAG-OX) 400 MG tablet Take 1 tablet by mouth daily. (Patient taking differently:   Take 250 mg by mouth daily ) 90 tablet 2    aspirin 81 MG EC tablet Take 81 mg by mouth daily.           No Known Allergies    Patient Active Problem List   Diagnosis    HTN (hypertension), benign    Hypertrophy of prostate without urinary obstruction and other lower urinary tract symptoms (LUTS)    Occlusion and stenosis of carotid artery    [facility]    Automatic implantable cardioverter-defibrillator in situ    CAD (coronary artery disease)    Hypomagnesemia    Spinal stenosis of lumbar region without neurogenic claudication    Cortical age-related cataract of both eyes    TIA (transient ischemic attack)    Dyslipidemia    Pancytopenia ([facility] [address]    Vitamin B12 deficiency disease    Age-related cognitive decline       Past Medical History:   Diagnosis Date    Blood circulation, collateral     carotid artery occlusion    BPH w/o urinary obs/LUTS 4/22/2011    CAD (coronary artery disease)     Carotid 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 4/22/2011    Dementia due to Alzheimer's disease ([facility] [address]     Dizziness 9/27/2012    Elevated prostate specific antigen (PSA) 4/22/2011    Essential hypertension, benign 4/22/2011    Mononucleosis age 49    caused spleen to rupture    Pneumothorax     Pure hypercholesterolemia 4/22/2011    Spinal stenosis of lumbar region without neurogenic claudication 10/31/2017    Mild to moderate L4-5    Ventricular tachycardia ([address] 10/2012       Past Surgical History:   Procedure Laterality Date    CARDIAC DEFIBRILLATOR PLACEMENT  10/10/12    dual chamber ICD Medtronic    CATARACT REMOVAL WITH IMPLANT  2018    CHEST TUBE INSERTION      OTHER SURGICAL HISTORY  10/2012    insertion of cardiac recorder    SPLENECTOMY          Family History   Problem Relation Age of Onset    Cancer Mother     Alzheimer's Disease Father     Cancer Brother         melanoma    Cancer Brother     Heart Disease Neg Hx     High Blood Pressure Neg Hx     High Cholesterol Neg Hx        Social History     Tobacco Use    Smoking status: Former Smoker     Packs/day: 1.50     Years: 35.00     Pack years: 52.50     Start date: 1958     Quit date: 1991     Years since quittin.3    Smokeless tobacco: Never Used   Substance Use Topics    Alcohol use: No    Drug use: No            Review of Systems  Review of Systems    Objective:   Physical Exam  Vitals:    21 1047   BP: 132/62   Pulse: 60   Resp: 16   Temp: 96.8 °F (36 °C)   TempSrc: Temporal   SpO2: 95%   Weight: 162 lb (73.5 kg)     BP Readings from Last 3 Encounters:   21 132/62   21 (!) 147/92   20 (!) 165/79      Wt Readings from Last 3 Encounters:   21 162 lb (73.5 kg)   21 164 lb (74.4 kg)   20 160 lb (72.6 kg)        Physical Exam  NAD    Skin is warm and dry. No rash. Well hydrated. No eccymosis   Alert and oriented x 3. Mood and affect are normal.  The neck is supple and free of adenopathy or masses, the thyroid is normal without enlargement or nodules. Chest: clear with no wheezes or rales. No retractions, or use of accessory muscles noted. Cardiovascular: PMI is not displaced, and no thrill noted. Regular rate and rhythm with no rub, murmur or gallop. No peripheral edema. No carotid bruits.     The abdomen is soft without

## 2021-06-01 ENCOUNTER — NURSE ONLY (OUTPATIENT)
Dept: CARDIOLOGY CLINIC | Age: 81
End: 2021-06-01
Payer: MEDICARE

## 2021-06-01 DIAGNOSIS — Z95.810 CARDIAC DEFIBRILLATOR IN PLACE: ICD-10-CM

## 2021-06-01 DIAGNOSIS — I47.20 VT (VENTRICULAR TACHYCARDIA): ICD-10-CM

## 2021-06-01 PROCEDURE — 93295 DEV INTERROG REMOTE 1/2/MLT: CPT | Performed by: INTERNAL MEDICINE

## 2021-06-01 PROCEDURE — 93296 REM INTERROG EVL PM/IDS: CPT | Performed by: INTERNAL MEDICINE

## 2021-06-01 NOTE — PROGRESS NOTES
We received remote transmission from patient's monitor at home. Transmission shows normal sensing and pacing function. EP physician will review. See interrogation under cardiology tab in the [facility] [address] field for more details. Optivol is within normal range.

## 2021-06-01 NOTE — LETTER
[address] [facility] [phone] [id number] [facility]  Jairo Vega [address] [place] [address] [phone]    Pacemaker/Defibrillator Clinic    06/01/21      Mayank Savage  [address] [place] [id number]      Dear Mayank Savage    This letter is to inform you that we received the transmission from your monitor at home that checks your implanted heart device. The next date your monitor will automatically transmit will be 8-31-21. If your report needs attention we will notify you. Your device and monitor are wireless and most transmit cellularly, but please periodically check your monitor is still plugged in to the electrical outlet. If you still use the telephone land line to send please ensure the connection to the phone yaron is secure. This will help to ensure successful automatic transmissions in the future. Also, the monitor needs to be close to you while sleeping at night. Please be aware that the remote device transmission sites are periodically monitored only during regular business hours during which simultaneous in-office device clinics are being run. If your transmission requires attention, we will contact you as soon as possible. **PLEASE NOTE** that our [facility] policy and processes are changing to ensure a more seamless approach for all parties involved, allowing more time for our nurses to address patient issues and concerns. We will no longer be sending letters for NORMAL remote transmissions. You will be contacted by phone if your transmission requires attention (as previously done), and letters will only be sent regarding monitor disconnections or missed transmissions if you are unable to be reached by phone. Please do not be alarmed by this new process, as we will continue to contact you if your transmission report requires attention. This will be your final \"remote received\" letter.   From this point forward, the [facility] will be utilizing the no news is good news approach. As always, please feel free to contact your nurse with any questions or concerns. Thank you.     Sarthak Clarke

## 2021-06-02 RX ORDER — LOSARTAN POTASSIUM 100 MG/1
TABLET ORAL
Qty: 90 TABLET | Refills: 1 | Status: SHIPPED | OUTPATIENT
Start: 2021-06-02 | End: 2022-01-31

## 2021-06-16 ENCOUNTER — NURSE ONLY (OUTPATIENT)
Dept: FAMILY MEDICINE CLINIC | Age: 81
End: 2021-06-16
Payer: MEDICARE

## 2021-06-16 DIAGNOSIS — Z23 NEED FOR MENINGOCOCCAL VACCINATION: Primary | ICD-10-CM

## 2021-06-16 PROCEDURE — 90620 MENB-4C VACCINE IM: CPT | Performed by: FAMILY MEDICINE

## 2021-06-16 PROCEDURE — 90471 IMMUNIZATION ADMIN: CPT | Performed by: FAMILY MEDICINE

## 2021-06-16 PROCEDURE — 90734 MENACWYD/MENACWYCRM VACC IM: CPT | Performed by: FAMILY MEDICINE

## 2021-06-28 ENCOUNTER — OFFICE VISIT (OUTPATIENT)
Dept: FAMILY MEDICINE CLINIC | Age: 81
End: 2021-06-28
Payer: MEDICARE

## 2021-06-28 VITALS
TEMPERATURE: 97.5 F | HEART RATE: 74 BPM | DIASTOLIC BLOOD PRESSURE: 74 MMHG | RESPIRATION RATE: 12 BRPM | OXYGEN SATURATION: 93 % | WEIGHT: 159 LBS | BODY MASS INDEX: 24.18 KG/M2 | SYSTOLIC BLOOD PRESSURE: 122 MMHG

## 2021-06-28 DIAGNOSIS — I95.2 HYPOTENSION DUE TO DRUGS: ICD-10-CM

## 2021-06-28 DIAGNOSIS — I10 HTN (HYPERTENSION), BENIGN: Chronic | ICD-10-CM

## 2021-06-28 DIAGNOSIS — M70.21 OLECRANON BURSITIS, RIGHT ELBOW: Primary | ICD-10-CM

## 2021-06-28 PROCEDURE — 99214 OFFICE O/P EST MOD 30 MIN: CPT | Performed by: FAMILY MEDICINE

## 2021-06-28 RX ORDER — AMLODIPINE BESYLATE 5 MG/1
5 TABLET ORAL DAILY
Qty: 90 TABLET | Refills: 1
Start: 2021-06-28 | End: 2022-03-15

## 2021-06-28 RX ORDER — ANAGRELIDE 0.5 MG/1
0.5 CAPSULE ORAL 4 TIMES DAILY
COMMUNITY
End: 2022-05-27

## 2021-06-28 SDOH — ECONOMIC STABILITY: FOOD INSECURITY: WITHIN THE PAST 12 MONTHS, YOU WORRIED THAT YOUR FOOD WOULD RUN OUT BEFORE YOU GOT MONEY TO BUY MORE.: NEVER TRUE

## 2021-06-28 SDOH — ECONOMIC STABILITY: FOOD INSECURITY: WITHIN THE PAST 12 MONTHS, THE FOOD YOU BOUGHT JUST DIDN'T LAST AND YOU DIDN'T HAVE MONEY TO GET MORE.: NEVER TRUE

## 2021-06-28 ASSESSMENT — SOCIAL DETERMINANTS OF HEALTH (SDOH): HOW HARD IS IT FOR YOU TO PAY FOR THE VERY BASICS LIKE FOOD, HOUSING, MEDICAL CARE, AND HEATING?: NOT HARD AT ALL

## 2021-06-28 NOTE — PROGRESS NOTES
Subjective:      Patient ID: Wing Christy [de-identified] y.o. male. is here for evaluation for swelling elbow and hpertension      HPI    William Armstrong bumped his elbow 3 weeks ago and had an abrasion. The abrasion healed but now the point of the elbow is swollen. Not painful or tender. Does not effect function. He denies fever or malaise. Rx: none    Blood pressure has been fluctuating. Since on higher dose of Amlodipine he feels tired. He never feels light headed or pre-syncopal.  No chest pain, palpitations, falls. Bp ranges 140/80 - 92/57  Before on higher dose of amlodipine his BP was in the 140 -1 50 all the time. Now mostly < 120. Outpatient Medications Marked as Taking for the 6/28/21 encounter (Office Visit) with Marilyn No MD   Medication Sig Dispense Refill    anagrelide (AGRYLIN) 0.5 MG capsule Take 0.5 mg by mouth 4 times daily      losartan (COZAAR) 100 MG tablet TAKE 1 TABLET DAILY 90 tablet 1    amLODIPine (NORVASC) 10 MG tablet Take 1 tablet by mouth daily TAKE 1 TABLET BY MOUTH EVERY DAY 90 tablet 1    sotalol (BETAPACE) 80 MG tablet TAKE 1 TABLET TWICE A  tablet 3    metoprolol tartrate (LOPRESSOR) 25 MG tablet TAKE 1/2 TABLET TWICE A DAY 90 tablet 3    simvastatin (ZOCOR) 20 MG tablet TAKE 1 TABLET EVERY EVENING 90 tablet 3    vitamin B-12 (CYANOCOBALAMIN) 100 MCG tablet Take 50 mcg by mouth daily      Cholecalciferol (VITAMIN D) 50 MCG (2000 UT) CAPS capsule Take by mouth      Multiple Vitamins-Minerals (MULTIVITAMIN ADULT PO) Take by mouth      magnesium oxide (MAG-OX) 400 MG tablet Take 1 tablet by mouth daily. (Patient taking differently:   Take 250 mg by mouth daily ) 90 tablet 2    aspirin 81 MG EC tablet Take 81 mg by mouth daily.           No Known Allergies    Patient Active Problem List   Diagnosis    HTN (hypertension), benign    Hypertrophy of prostate without urinary obstruction and other lower urinary tract symptoms (LUTS)    Occlusion and stenosis of carotid artery    VT (ventricular tachycardia) (HCC)    Automatic implantable cardioverter-defibrillator in situ    CAD (coronary artery disease)    Hypomagnesemia    Spinal stenosis of lumbar region without neurogenic claudication    Cortical age-related cataract of both eyes    TIA (transient ischemic attack)    Dyslipidemia    Vitamin B12 deficiency disease    Age-related cognitive decline    Thrombocytosis ([address]    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    Dementia due to Alzheimer's disease ([address]     Dizziness 2012    Elevated prostate specific antigen (PSA) 2011    Essential hypertension, benign 2011    Mononucleosis age 49    caused spleen to rupture    Pneumothorax     Pure hypercholesterolemia 2011    Spinal stenosis of lumbar region without neurogenic claudication 10/31/2017    Mild to moderate L4-5    Ventricular tachycardia ([address] 10/2012       Past Surgical History:   Procedure Laterality Date    CARDIAC DEFIBRILLATOR PLACEMENT  10/10/12    dual chamber ICD Medtronic    CATARACT REMOVAL WITH IMPLANT  2018    CHEST TUBE INSERTION      OTHER SURGICAL HISTORY  10/2012    insertion of cardiac recorder    SPLENECTOMY          Family History   Problem Relation Age of Onset    Cancer Mother     Alzheimer's Disease Father     Cancer Brother         melanoma    Cancer Brother     Heart Disease Neg Hx     High Blood Pressure Neg Hx     High Cholesterol Neg Hx        Social History     Tobacco Use    Smoking status: Former Smoker     Packs/day: 1.50     Years: 35.00     Pack years: 52.50     Start date: 1958     Quit date: 1991     Years since quittin.5    Smokeless tobacco: Never Used   Vaping Use    Vaping Use: Never used Substance Use Topics    Alcohol use: No    Drug use: No            Review of Systems  Review of Systems    Objective:   Physical Exam  Vitals:    06/28/21 1114   BP: 122/74   Site: Left Upper Arm   Position: Sitting   Cuff Size: Medium Adult   Pulse: 74   Resp: 12   Temp: 97.5 °F (36.4 °C)   TempSrc: Temporal   SpO2: 93%   Weight: 159 lb (72.1 kg)       Physical Exam  NAD    Skin is warm and dry. No rash. Well hydrated  Alert and oriented x 3. Mood and affect are normal.  The neck is supple and free of adenopathy or masses, the thyroid is normal without enlargement or nodules. Chest: clear with no wheezes or rales. No retractions, or use of accessory muscles noted. Cardiovascular: PMI is not displaced, and no thrill noted. Regular rate and rhythm with no rub, murmur or gallop. No peripheral edema. Right olecranon bursae swelling, moderate. No induration. No effusion of the elbow. Full arom elbow. Radial pulse intact. Assessment:       Diagnosis Orders   1. Olecranon bursitis, right elbow  No s/s infection  Avoid leaning on elbow. Follow up if red/hot/worsening swelling. reassured   2. HTN (hypertension), benign  amLODIPine (NORVASC) 5 MG tablet   3. Hypotension due to drugs  Decrease Amlodipine to 5 mg. Send BP to me on MyChart next week. Plan:      Side effects of current medications reviewed and questions answered. Follow up in 3 months or prn.

## 2021-08-23 ENCOUNTER — OFFICE VISIT (OUTPATIENT)
Dept: FAMILY MEDICINE CLINIC | Age: 81
End: 2021-08-23
Payer: MEDICARE

## 2021-08-23 VITALS
RESPIRATION RATE: 12 BRPM | WEIGHT: 158.6 LBS | SYSTOLIC BLOOD PRESSURE: 120 MMHG | HEIGHT: 68 IN | OXYGEN SATURATION: 93 % | BODY MASS INDEX: 24.04 KG/M2 | TEMPERATURE: 97.1 F | DIASTOLIC BLOOD PRESSURE: 76 MMHG | HEART RATE: 68 BPM

## 2021-08-23 DIAGNOSIS — Z23 NEED FOR PROPHYLACTIC VACCINATION AND INOCULATION AGAINST VARICELLA: ICD-10-CM

## 2021-08-23 DIAGNOSIS — Z00.00 ROUTINE GENERAL MEDICAL EXAMINATION AT A HEALTH CARE FACILITY: Primary | ICD-10-CM

## 2021-08-23 PROCEDURE — G0439 PPPS, SUBSEQ VISIT: HCPCS | Performed by: FAMILY MEDICINE

## 2021-08-23 ASSESSMENT — PATIENT HEALTH QUESTIONNAIRE - PHQ9
SUM OF ALL RESPONSES TO PHQ QUESTIONS 1-9: 0
2. FEELING DOWN, DEPRESSED OR HOPELESS: 0
SUM OF ALL RESPONSES TO PHQ QUESTIONS 1-9: 0
1. LITTLE INTEREST OR PLEASURE IN DOING THINGS: 0
SUM OF ALL RESPONSES TO PHQ QUESTIONS 1-9: 0
2. FEELING DOWN, DEPRESSED OR HOPELESS: 0
SUM OF ALL RESPONSES TO PHQ QUESTIONS 1-9: 0
SUM OF ALL RESPONSES TO PHQ9 QUESTIONS 1 & 2: 0
SUM OF ALL RESPONSES TO PHQ QUESTIONS 1-9: 0
SUM OF ALL RESPONSES TO PHQ QUESTIONS 1-9: 0
1. LITTLE INTEREST OR PLEASURE IN DOING THINGS: 0
SUM OF ALL RESPONSES TO PHQ9 QUESTIONS 1 & 2: 0

## 2021-08-23 ASSESSMENT — LIFESTYLE VARIABLES
HOW OFTEN DO YOU HAVE A DRINK CONTAINING ALCOHOL: 0
AUDIT TOTAL SCORE: INCOMPLETE
AUDIT-C TOTAL SCORE: INCOMPLETE
HOW OFTEN DO YOU HAVE A DRINK CONTAINING ALCOHOL: NEVER

## 2021-08-23 NOTE — PROGRESS NOTES
Medicare Annual Wellness Visit  Name: Cecil Natalee Date: 2021   MRN: [id number] Sex: Male   Age: [de-identified] y.o. Ethnicity: Non- / Non    : 1940 Race: White (non-)      Chance Marquez is here for Medicare AWV    Screenings for behavioral, psychosocial and functional/safety risks, and cognitive dysfunction are all negative except as indicated below. These results, as well as other patient data from the [address] form, are documented in Flowsheets linked to this Encounter. No Known Allergies    Outpatient Medications Marked as Taking for the 21 encounter (Office Visit) with Des Epstein MD   Medication Sig Dispense Refill    anagrelide (AGRYLIN) 0.5 MG capsule Take 0.5 mg by mouth 4 times daily      amLODIPine (NORVASC) 5 MG tablet Take 1 tablet by mouth daily TAKE 1 TABLET BY MOUTH EVERY DAY 90 tablet 1    losartan (COZAAR) 100 MG tablet TAKE 1 TABLET DAILY 90 tablet 1    sotalol (BETAPACE) 80 MG tablet TAKE 1 TABLET TWICE A  tablet 3    metoprolol tartrate (LOPRESSOR) 25 MG tablet TAKE 1/2 TABLET TWICE A DAY 90 tablet 3    simvastatin (ZOCOR) 20 MG tablet TAKE 1 TABLET EVERY EVENING 90 tablet 3    vitamin B-12 (CYANOCOBALAMIN) 100 MCG tablet Take 50 mcg by mouth daily      Cholecalciferol (VITAMIN D) 50 MCG (2000) CAPS capsule Take by mouth      Multiple Vitamins-Minerals (MULTIVITAMIN ADULT PO) Take by mouth      magnesium oxide (MAG-OX) 400 MG tablet Take 1 tablet by mouth daily. (Patient taking differently:   Take 250 mg by mouth daily ) 90 tablet 2    aspirin 81 MG EC tablet Take 81 mg by mouth daily.            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    Dementia due to Alzheimer's disease ([address]     Dizziness 2012    Elevated prostate specific antigen (PSA) 4/22/2011    Essential hypertension, benign 4/22/2011    Mononucleosis age 49    caused spleen to rupture    Pneumothorax     Pure hypercholesterolemia 4/22/2011    Spinal stenosis of lumbar region without neurogenic claudication 10/31/2017    Mild to moderate L4-5    Ventricular tachycardia ([address] 10/2012       Past Surgical History:   Procedure Laterality Date    CARDIAC DEFIBRILLATOR PLACEMENT  10/10/12    dual chamber ICD Medtronic    CATARACT REMOVAL WITH IMPLANT  08/2018    CHEST TUBE INSERTION      OTHER SURGICAL HISTORY  10/2012    insertion of cardiac recorder    SPLENECTOMY         Family History   Problem Relation Age of Onset    Cancer Mother     Alzheimer's Disease Father     Cancer Brother         melanoma    Cancer Brother     Heart Disease Neg Hx     High Blood Pressure Neg Hx     High Cholesterol Neg Hx        CareTeam (Including outside providers/suppliers regularly involved in providing care):   Patient Care Team:  Kia Brown MD as PCP - Tracy Reddy MD as PCP - [facility] Empaneled Provider  Michael Posey MD as Consulting Physician (Electrophysiology)  Asya Woodall MD as Consulting Physician (Otolaryngology)    Wt Readings from Last 3 Encounters:   08/23/21 158 lb 9.6 oz (71.9 kg)   06/28/21 159 lb (72.1 kg)   04/21/21 162 lb (73.5 kg)     Vitals:    08/23/21 1021   BP: 120/76   Pulse: 68   Resp: 12   Temp: 97.1 °F (36.2 °C)   TempSrc: Temporal   SpO2: 93%   Weight: 158 lb 9.6 oz (71.9 kg)   Height: 5' 8\" (1.727 m)     Body mass index is 24.12 kg/m². Based upon direct observation of the patient, evaluation of cognition reveals recent and remote memory intact.     General Appearance: alert and oriented to person, place and time, well developed and well- nourished, in no acute distress  Skin: warm and dry, no rash or erythema  Head: normocephalic and atraumatic  Eyes: pupils equal, round, and reactive to light, extraocular eye movements intact, conjunctivae normal  ENT: tympanic membrane, external ear and ear canal normal bilaterally, nose without deformity, nasal mucosa and turbinates normal without polyps  Neck: supple and non-tender without mass, no thyromegaly or thyroid nodules, no cervical lymphadenopathy  Pulmonary/Chest: clear to auscultation bilaterally- no wheezes, rales or rhonchi, normal air movement, no respiratory distress  Cardiovascular: normal rate, regular rhythm, normal S1 and S2, no murmurs, rubs, clicks, or gallops, distal pulses intact, no carotid bruits  Abdomen: soft, non-tender, non-distended, normal bowel sounds, no masses or organomegaly  Extremities: no cyanosis, clubbing or edema  Musculoskeletal: normal range of motion, no joint swelling, deformity or tenderness  Neurologic: reflexes normal and symmetric, no cranial nerve deficit, gait, coordination and speech normal    Patient's complete Health Risk Assessment and screening values have been reviewed and are found in Flowsheets. The following problems were reviewed today and where indicated follow up appointments were made and/or referrals ordered. Positive Risk Factor Screenings with Interventions:          General Health and ACP:  General  In general, how would you say your health is?: Very Good  In the past 7 days, have you experienced any of the following?  New or Increased Pain, New or Increased Fatigue, Loneliness, Social Isolation, Stress or Anger?: None of These  Do you get the social and emotional support that you need?: Yes  Do you have a Living Will?: Yes  Advance Directives     Power of [address] [place] [address] Will ACP-Advance Directive ACP-Power of     Not on File Not on File Not on File Not on File      General Health Risk Interventions:  · has LW and DPOA; will provide a copy    Health Habits/Nutrition:  Health Habits/Nutrition  Do you exercise for at least 20 minutes 2-3 times per week?: (!) No  Have you lost any weight without trying in the past 3 months?: No  Do you eat only one meal per day?: No  Have you seen the dentist within the past year?: (!) No  Body mass index: 24.11  Health Habits/Nutrition Interventions:  · Inadequate physical activity:  does walk the dog and does yard work   · Dental exam overdue:  patient encouraged to make appointment with his/her dentist, --------------------------------------------------------------------------------    Hearing/Vision:  No exam data present  Hearing/Vision  Do you or your family notice any trouble with your hearing that hasn't been managed with hearing aids?: (!) Yes  Do you have difficulty driving, watching TV, or doing any of your daily activities because of your eyesight?: (!) Yes  Have you had an eye exam within the past year?: (!) No  Hearing/Vision Interventions:  · Hearing concerns:  has hearing aides and follows up with audiologist quarterly  · Vision concerns:  patient encouraged to make appointment with his/her eye specialist    Safety:  Safety  Do you have working smoke detectors?: Yes  Have all throw rugs been removed or fastened?: (!) No  Do you have non-slip mats or surfaces in all bathtubs/showers?: Yes  Do all of your stairways have a railing or banister?: Yes  Are your doorways, halls and stairs free of clutter?: Yes  Do you always fasten your seatbelt when you are in a car?: Yes  Safety Interventions:  · Home safety tips provided     Personalized Preventive Plan   Current Health Maintenance Status  Immunization History   Administered Date(s) Administered    COVID-19, Pfizer, PF, 30mcg/0.3mL 01/29/2021, 02/19/2021    HIB PRP-T (ActHIB, Hiberix) 04/21/2021    Influenza Virus Vaccine 09/16/2012    Influenza Whole 09/16/2010, 09/17/2011    Influenza, High Dose (Fluzone 65 yrs and older) 09/23/2013, 10/07/2016, 08/21/2018    Meningococcal B, OMV (Bexsero) 04/21/2021, 06/16/2021    Meningococcal MCV4O (Menveo) 04/21/2021, 06/16/2021    Pneumococcal Conjugate 13-valent (Kierra Damon) 08/24/2015    Pneumococcal Polysaccharide ([id number]) 12/14/2007    Td, unspecified formulation 01/14/2006    Zoster Live (Zostavax) 09/23/2012        Health Maintenance   Topic Date Due    Shingles Vaccine (2 of 3) 11/18/2012    Annual Wellness Visit (AWV)  Never done    Flu vaccine (1) 09/01/2021    Lipid screen  04/22/2022    Potassium monitoring  04/22/2022    Creatinine monitoring  04/22/2022    Meningococcal B vaccine (3 of 4 - Increased Risk Bexsero 2-dose series) 06/16/2022    Meningococcal (ACWY) vaccine (3 - Risk 2-dose series) 06/16/2026    DTaP/Tdap/Td vaccine (2 - Td or Tdap) 01/07/2030    Hib vaccine  Completed    Pneumococcal 65+ yrs at Risk Vaccine  Completed    COVID-19 Vaccine  Completed    Hepatitis A vaccine  Aged Out    Hepatitis B vaccine  Aged Out     Recommendations for [employer] Due: see orders and patient instructions/AVS.  . Recommended screening schedule for the next 5-10 years is provided to the patient in written form: see Patient Olimpia Olivarez was seen today for medicare awv. Diagnoses and all orders for this visit:    Routine general medical examination at a health care facility    Need for prophylactic vaccination and inoculation against varicella  -     zoster recombinant adjuvanted vaccine [facility]) 50 MCG/0.5ML SUSR injection; Inject 0.5 mLs into the muscle once for 1 dose                 Advance Care Planning   Advanced Care Planning: Discussed the patients choices for care and treatment in case of a health event that adversely affects decision-making abilities. Also discussed the patients long-term treatment options. Reviewed with the patient the [address] [place] [address] Declaration forms  Reviewed the process of designating a competent adult as an Agent (or -in-fact) that could take make health care decisions for the patient if incompetent.  Patient was asked to complete the declaration forms, either acknowledge the forms by a public notary or an eligible witness and provide a signed copy to the practice office. Time spent (minutes): 2 minutes   Does not life support or tube feeding if terminal    Cardiovascular Disease Risk Counseling: Assessed the patient's risk to develop cardiovascular disease and reviewed main risk factors. Reviewed steps to reduce disease risk including:   · Quitting tobacco use, reducing amount smoked, or not starting the habit  · Making healthy food choices  · Being physically active and gradualy increasing activity levels   · Reduce weight and determine a healthy BMI goal  · Monitor blood pressure and treat if higher than 140/90 mmHg  · Maintain blood total cholesterol levels under 5 mmol/l or 190 mg/dl  · Maintain LDL cholesterol levels under 3.0 mmol/l or 115 mg/dl   · Control blood glucose levels  · Consider taking aspirin (75 mg daily), once blood pressure is controlled   Provided a follow up plan.   Time spent (minutes): 5 mintues

## 2021-08-23 NOTE — PATIENT INSTRUCTIONS
Personalized Preventive Plan for Cinthia Lebron - 8/23/2021  Medicare offers a range of preventive health benefits. Some of the tests and screenings are paid in full while other may be subject to a deductible, co-insurance, and/or copay. Some of these benefits include a comprehensive review of your medical history including lifestyle, illnesses that may run in your family, and various assessments and screenings as appropriate. After reviewing your medical record and screening and assessments performed today your provider may have ordered immunizations, labs, imaging, and/or referrals for you. A list of these orders (if applicable) as well as your Preventive Care list are included within your After Visit Summary for your review. Other Preventive Recommendations:    · A preventive eye exam performed by an eye specialist is recommended every 1-2 years to screen for glaucoma; cataracts, macular degeneration, and other eye disorders. · A preventive dental visit is recommended every 6 months. · Try to get at least 150 minutes of exercise per week or 10,000 steps per day on a pedometer . · Order or download the FREE \"Exercise & Physical Activity: Your Everyday Guide\" from [employer]. Call 5-978.300.9071 or search [employer] online. · You need 6504-9463 mg of calcium and 5145-0315 IU of vitamin D per day. It is possible to meet your calcium requirement with diet alone, but a vitamin D supplement is usually necessary to meet this goal.  · When exposed to the sun, use a sunscreen that protects against both UVA and UVB radiation with an SPF of 30 or greater. Reapply every 2 to 3 hours or after sweating, drying off with a towel, or swimming. · Always wear a seat belt when traveling in a car. Always wear a helmet when riding a bicycle or motorcycle.

## 2021-08-31 ENCOUNTER — NURSE ONLY (OUTPATIENT)
Dept: CARDIOLOGY CLINIC | Age: 81
End: 2021-08-31
Payer: MEDICARE

## 2021-08-31 DIAGNOSIS — I47.20 VT (VENTRICULAR TACHYCARDIA): ICD-10-CM

## 2021-08-31 DIAGNOSIS — Z95.810 CARDIAC DEFIBRILLATOR IN PLACE: ICD-10-CM

## 2021-08-31 PROCEDURE — 93296 REM INTERROG EVL PM/IDS: CPT | Performed by: INTERNAL MEDICINE

## 2021-08-31 PROCEDURE — 93295 DEV INTERROG REMOTE 1/2/MLT: CPT | Performed by: INTERNAL MEDICINE

## 2021-08-31 NOTE — PROGRESS NOTES
We received remote transmission from patient's monitor at home. Transmission shows normal sensing and pacing function. EP physician will review. See interrogation under cardiology tab in the [address] field for more details. Optivol is within normal range.

## 2021-11-02 PROBLEM — D47.3 ESSENTIAL THROMBOCYTOSIS (HCC): Status: ACTIVE | Noted: 2021-04-20

## 2021-11-03 NOTE — PROGRESS NOTES
Subjective:      Patient ID: Son Jay [de-identified] y.o. male. The primary encounter diagnosis was HTN (hypertension), benign. Diagnoses of Dyslipidemia, Age-related cognitive decline, and Essential thrombocytosis ([address] were also pertinent to this visit. HPI    Cognitive decline: continues to live independently with his wife. He pays all bills, etc without error. He notes not change. Essential thrombocytosis:  Sees Dr. Darling Irwin. Hypertension: Patient here for follow-up of elevated blood pressure. He is exercising (walks the dog, yard work) and is adherent to low salt diet. Blood pressure is not testing at home. Cardiac symptoms none. Patient denies chest pressure/discomfort, dyspnea, exertional chest pressure/discomfort, lower extremity edema, orthopnea and palpitations. Cardiovascular risk factors: advanced age (older than 54 for men, 72 for women), dyslipidemia and male gender. Use of agents associated with hypertension: none. History of target organ damage: none. Hyperlipidemia:  No new myalgias or GI upset on simvastatin (Zocor). Medication compliance: compliant most of the time. Patient is  following a low fat, low cholesterol diet. He is  exercising regularly.      Lab Results   Component Value Date    CHOL 134 04/22/2021    TRIG 134 04/22/2021    HDL 32 (L) 04/22/2021    LDLCALC 78 04/22/2021     Lab Results   Component Value Date    ALT 19 04/22/2021    AST 14 04/22/2021        Labs Renal Latest Ref Rng & Units 4/22/2021 11/4/2020 7/8/2020 7/6/2020 7/2/2020   BUN 8 - 27 mg/dL 15 24(H) 18 19 22   Cr 0.76 - 1.27 mg/dL 0.94 0.7(L) 0.8 1.0 0.99   K 3.5 - 5.2 mmol/L 4.2 3.7 3.8 3.8 4.1   Na 134 - 144 mmol/L 140 141 140 138 140      Lab Results   Component Value Date    WBC 13.2 (H) 04/22/2021    HGB 15.4 04/22/2021    HCT 44.2 04/22/2021    MCV 97 04/22/2021     (H) 04/22/2021     Lab Results   Component Value Date    TSH 3.930 04/22/2021     Lab Results   Component Value Date [id number] 219 07/06/2020      Lab Results   Component Value Date    LABA1C 5.7 07/07/2020     Lab Results   Component Value Date    .9 07/07/2020        Outpatient Medications Marked as Taking for the 11/8/21 encounter (Office Visit) with Sobeida Romero MD   Medication Sig Dispense Refill    anagrelide (AGRYLIN) 0.5 MG capsule Take 0.5 mg by mouth 4 times daily      amLODIPine (NORVASC) 5 MG tablet Take 1 tablet by mouth daily TAKE 1 TABLET BY MOUTH EVERY DAY 90 tablet 1    losartan (COZAAR) 100 MG tablet TAKE 1 TABLET DAILY 90 tablet 1    sotalol (BETAPACE) 80 MG tablet TAKE 1 TABLET TWICE A  tablet 3    metoprolol tartrate (LOPRESSOR) 25 MG tablet TAKE 1/2 TABLET TWICE A DAY 90 tablet 3    simvastatin (ZOCOR) 20 MG tablet TAKE 1 TABLET EVERY EVENING 90 tablet 3    Multiple Vitamins-Minerals (MULTIVITAMIN ADULT PO) Take by mouth      aspirin 81 MG EC tablet Take 81 mg by mouth daily.           No Known Allergies    Patient Active Problem List   Diagnosis    HTN (hypertension), benign    Hypertrophy of prostate without urinary obstruction and other lower urinary tract symptoms (LUTS)    Occlusion and stenosis of carotid artery    VT (ventricular tachycardia) (HCC)    Automatic implantable cardioverter-defibrillator in situ    CAD (coronary artery disease)    Hypomagnesemia    Spinal stenosis of lumbar region without neurogenic claudication    Cortical age-related cataract of both eyes    TIA (transient ischemic attack)    Dyslipidemia    Vitamin B12 deficiency disease    Age-related cognitive decline    Essential thrombocytosis [address]    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    Olecranon bursitis, right elbow       Past Medical History:   Diagnosis Date    Blood circulation, collateral     carotid artery occlusion    BPH w/o urinary obs/LUTS 4/22/2011    CAD (coronary artery disease)     Carotid 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 4/22/2011    Dementia due to Alzheimer's disease ([address]     Dizziness 2012    Elevated prostate specific antigen (PSA) 2011    Essential hypertension, benign 2011    Mononucleosis age 49    caused spleen to rupture    Pneumothorax     Pure hypercholesterolemia 2011    Spinal stenosis of lumbar region without neurogenic claudication 10/31/2017    Mild to moderate L4-5    Ventricular tachycardia ([address] 10/2012       Past Surgical History:   Procedure Laterality Date    CARDIAC DEFIBRILLATOR PLACEMENT  10/10/12    dual chamber ICD Medtronic    CATARACT REMOVAL WITH IMPLANT  2018    CHEST TUBE INSERTION      OTHER SURGICAL HISTORY  10/2012    insertion of cardiac recorder    SPLENECTOMY          Family History   Problem Relation Age of Onset    Cancer Mother     Alzheimer's Disease Father     Cancer Brother         melanoma    Cancer Brother     Heart Disease Neg Hx     High Blood Pressure Neg Hx     High Cholesterol Neg Hx        Social History     Tobacco Use    Smoking status: Former Smoker     Packs/day: 1.50     Years: 35.00     Pack years: 52.50     Start date: 1958     Quit date: 1991     Years since quittin.8    Smokeless tobacco: Never Used   Vaping Use    Vaping Use: Never used   Substance Use Topics    Alcohol use: No    Drug use: No            Review of Systems  Review of Systems    Objective:   Physical Exam  Vitals:    21 1035 21 1043   BP: (!) 164/84 136/78   Pulse: 66 69   Resp: 12    Temp: 97.5 °F (36.4 °C)    SpO2: 95%    Weight: 161 lb 3.2 oz (73.1 kg)      BP Readings from Last 3 Encounters:   21 136/78   21 120/76   21 122/74        Physical Exam    NAD    Skin is warm and dry. No rash. Well hydrated  Alert and oriented x 3. Mood and affect are normal.  The neck is supple and free of adenopathy or masses, the thyroid is normal without enlargement or nodules. Chest: clear with no wheezes or rales. No retractions, or use of accessory muscles noted.     Cardiovascular: PMI is not displaced, and no thrill noted. Regular rate and rhythm with no rub, murmur or gallop. No peripheral edema. The abdomen is soft without tenderness, guarding, mass, rebound or organomegaly. Aorta, femoral, DP and PT pulses intact. Assessment:       Diagnosis Orders   1. HTN (hypertension), benign  Not at goal < 130/80 today but normal the last 2 times he was in. monitor   2. Dyslipidemia  LDL at goal   Tolerating statin   3. Age-related cognitive decline  Saw neurology. Stable. Continue to monitor   4. Essential thrombocytosis (HCC)  Improved  Continue with Dr. Hilda Sánchez:      Side effects of current medications reviewed and questions answered. Follow up in 6 months or prn.

## 2021-11-08 ENCOUNTER — OFFICE VISIT (OUTPATIENT)
Dept: FAMILY MEDICINE CLINIC | Age: 81
End: 2021-11-08
Payer: MEDICARE

## 2021-11-08 VITALS
TEMPERATURE: 97.5 F | WEIGHT: 161.2 LBS | BODY MASS INDEX: 24.51 KG/M2 | HEART RATE: 69 BPM | OXYGEN SATURATION: 95 % | DIASTOLIC BLOOD PRESSURE: 78 MMHG | SYSTOLIC BLOOD PRESSURE: 136 MMHG | RESPIRATION RATE: 12 BRPM

## 2021-11-08 DIAGNOSIS — I10 HTN (HYPERTENSION), BENIGN: Primary | ICD-10-CM

## 2021-11-08 DIAGNOSIS — D47.3 ESSENTIAL THROMBOCYTOSIS (HCC): ICD-10-CM

## 2021-11-08 DIAGNOSIS — E78.5 DYSLIPIDEMIA: ICD-10-CM

## 2021-11-08 DIAGNOSIS — R41.81 AGE-RELATED COGNITIVE DECLINE: ICD-10-CM

## 2021-11-08 PROCEDURE — 99214 OFFICE O/P EST MOD 30 MIN: CPT | Performed by: FAMILY MEDICINE

## 2021-11-30 ENCOUNTER — NURSE ONLY (OUTPATIENT)
Dept: CARDIOLOGY CLINIC | Age: 81
End: 2021-11-30
Payer: MEDICARE

## 2021-11-30 DIAGNOSIS — Z95.810 CARDIAC DEFIBRILLATOR IN PLACE: ICD-10-CM

## 2021-11-30 DIAGNOSIS — I47.20 VT (VENTRICULAR TACHYCARDIA): ICD-10-CM

## 2021-11-30 PROCEDURE — 93296 REM INTERROG EVL PM/IDS: CPT | Performed by: INTERNAL MEDICINE

## 2021-11-30 PROCEDURE — 93295 DEV INTERROG REMOTE 1/2/MLT: CPT | Performed by: INTERNAL MEDICINE

## 2022-01-29 DIAGNOSIS — E78.2 MIXED HYPERLIPIDEMIA: Chronic | ICD-10-CM

## 2022-01-31 RX ORDER — SIMVASTATIN 20 MG
TABLET ORAL
Qty: 90 TABLET | Refills: 3 | Status: SHIPPED | OUTPATIENT
Start: 2022-01-31

## 2022-01-31 RX ORDER — LOSARTAN POTASSIUM 100 MG/1
TABLET ORAL
Qty: 90 TABLET | Refills: 1 | Status: SHIPPED | OUTPATIENT
Start: 2022-01-31 | End: 2022-08-02

## 2022-01-31 NOTE — TELEPHONE ENCOUNTER
Requested Prescriptions     Pending Prescriptions Disp Refills    losartan (COZAAR) 100 MG tablet [Pharmacy Med Name: LOSARTAN TAB 100MG] 90 tablet 1     Sig: TAKE 1 TABLET DAILY    simvastatin (ZOCOR) 20 MG tablet [Pharmacy Med Name: SIMVASTATIN  TAB 20MG] 90 tablet 3     Sig: TAKE 1 TABLET EVERY EVENING       LOV 11/8/2021  Nov 5/6/22  lABS 4/22/21

## 2022-03-01 ENCOUNTER — NURSE ONLY (OUTPATIENT)
Dept: CARDIOLOGY CLINIC | Age: 82
End: 2022-03-01
Payer: MEDICARE

## 2022-03-01 DIAGNOSIS — I47.20 VT (VENTRICULAR TACHYCARDIA): ICD-10-CM

## 2022-03-01 DIAGNOSIS — Z95.810 CARDIAC DEFIBRILLATOR IN PLACE: ICD-10-CM

## 2022-03-02 PROCEDURE — 93296 REM INTERROG EVL PM/IDS: CPT | Performed by: INTERNAL MEDICINE

## 2022-03-02 PROCEDURE — 93295 DEV INTERROG REMOTE 1/2/MLT: CPT | Performed by: INTERNAL MEDICINE

## 2022-03-08 RX ORDER — SOTALOL HYDROCHLORIDE 80 MG/1
TABLET ORAL
Qty: 180 TABLET | Refills: 3 | Status: SHIPPED | OUTPATIENT
Start: 2022-03-08

## 2022-03-08 NOTE — TELEPHONE ENCOUNTER
Lov 3/2/2021   Labs no labs   Lrf 3/8/2021 90 day supply /c 3 refills   Appt no appt scheduled please advise thanks

## 2022-03-15 DIAGNOSIS — I10 HTN (HYPERTENSION), BENIGN: Chronic | ICD-10-CM

## 2022-03-15 RX ORDER — AMLODIPINE BESYLATE 5 MG/1
TABLET ORAL
Qty: 90 TABLET | Refills: 1 | Status: SHIPPED | OUTPATIENT
Start: 2022-03-15 | End: 2022-05-27 | Stop reason: SDUPTHER

## 2022-05-26 NOTE — PROGRESS NOTES
Subjective:      Patient ID: Shannon Vang 81 y.o. male. The primary encounter diagnosis was HTN (hypertension), benign. Diagnoses of Dyslipidemia, Essential thrombocytosis ([address] Age-related cognitive decline, and TIA (transient ischemic attack) were also pertinent to this visit. HPI    Several weeks to a month or so feels lethargic, low energy. Sleeping well with the use of an over-the-counter sleep aide x one year. Melatonin did not help. Does not feel depressed. Hypertension and hx TIA: Patient here for follow-up of elevated blood pressure. He is exercising and is adherent to low salt diet. Blood pressure is not testing  at home. Cardiac symptoms . fatigue  Patient denies chest pressure/discomfort, dyspnea, exertional chest pressure/discomfort, irregular heart beat, lower extremity edema and palpitations. The patient denies any symptoms of neurological impairment or TIA's; no amaurosis, diplopia, dysphasia, or unilateral disturbance of motor or sensory function. No loss of balance or vertigo. Cardiovascular risk factors: advanced age (older than 54 for men, 72 for women), dyslipidemia, hypertension and male gender. Use of agents associated with hypertension: none. History of target organ damage: transient ischemic attack. Cognitive decline; no loss of independent fxn. Essential thrombocytosis: post splenectomy. Sees Dr. Adrien Solitario. On hydroxyurea. Hyperlipidemia:  No new myalgias or GI upset on simvastatin (Zocor). Medication compliance: compliant most of the time. Patient is  following a low fat, low cholesterol diet. He is  exercising regularly.                  Lab Results   Component Value Date     04/22/2021    K 4.2 04/22/2021     04/22/2021    CO2 26 04/22/2021    BUN 15 04/22/2021    CREATININE 0.94 04/22/2021    GLUCOSE 102 (H) 04/22/2021    CALCIUM 9.1 04/22/2021    PROT 6.5 04/22/2021    LABALBU 4.0 04/22/2021    BILITOT 0.7 04/22/2021    ALKPHOS 89 04/22/2021 AST 14 04/22/2021    ALT 19 04/22/2021    LABGLOM 76 04/22/2021    GFRAA 88 04/22/2021    AGRATIO 1.6 04/22/2021    GLOB 2.5 04/22/2021        Lab Results   Component Value Date    WBC 13.2 (H) 04/22/2021    HGB 15.4 04/22/2021    HCT 44.2 04/22/2021    MCV 97 04/22/2021     (H) 04/22/2021     Lab Results   Component Value Date    CHOL 134 04/22/2021    CHOL 126 07/07/2020    CHOL 124 07/02/2020     Lab Results   Component Value Date    TRIG 134 04/22/2021    TRIG 154 (H) 07/07/2020    TRIG 109 07/02/2020     Lab Results   Component Value Date    HDL 32 (L) 04/22/2021    HDL 29 (L) 07/07/2020    HDL 33 (L) 07/02/2020     Lab Results   Component Value Date    LDLCALC 78 04/22/2021    LDLCALC 66 07/07/2020    LDLCALC 69 07/02/2020     Lab Results   Component Value Date    LABVLDL 24 04/22/2021    LABVLDL 31 07/07/2020    LABVLDL 22 07/02/2020     No results found for: CHOLHDLRATIO   TSH   Date Value Ref Range Status   04/22/2021 3.930 0.450 - 4.500 uIU/mL Final   07/02/2020 3.840 0.450 - 4.500 uIU/mL Final   12/06/2019 4.060 0.450 - 4.500 uIU/mL Final           Outpatient Medications Marked as Taking for the 5/27/22 encounter (Office Visit) with Sonal Coronel MD   Medication Sig Dispense Refill    hydroxyurea (HYDREA) 500 MG chemo capsule Take 500 mg by mouth daily      amLODIPine (NORVASC) 5 MG tablet TAKE 1 TABLET BY MOUTH EVERY DAY 90 tablet 1    metoprolol tartrate (LOPRESSOR) 25 MG tablet TAKE 1/2 TABLET TWICE A DAY 90 tablet 3    sotalol (BETAPACE) 80 MG tablet TAKE 1 TABLET TWICE A  tablet 3    losartan (COZAAR) 100 MG tablet TAKE 1 TABLET DAILY 90 tablet 1    simvastatin (ZOCOR) 20 MG tablet TAKE 1 TABLET EVERY EVENING 90 tablet 3    Multiple Vitamins-Minerals (MULTIVITAMIN ADULT PO) Take by mouth      aspirin 81 MG EC tablet Take 81 mg by mouth daily.           No Known Allergies    Patient Active Problem List   Diagnosis    HTN (hypertension), benign    Hypertrophy of prostate without urinary obstruction and other lower urinary tract symptoms (LUTS)    Occlusion and stenosis of carotid artery    VT (ventricular tachycardia) (HCC)    Automatic implantable cardioverter-defibrillator in situ    CAD (coronary artery disease)    Hypomagnesemia    Spinal stenosis of lumbar region without neurogenic claudication    Cortical age-related cataract of both eyes    TIA (transient ischemic attack)    Dyslipidemia    Vitamin B12 deficiency disease    Age-related cognitive decline    Essential thrombocytosis ([address]    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    Olecranon bursitis, right elbow       Past Medical History:   Diagnosis Date    Blood circulation, collateral     carotid artery occlusion    BPH w/o urinary obs/LUTS 4/22/2011    CAD (coronary artery disease)     Carotid 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 4/22/2011    Dementia due to Alzheimer's disease ([address]     Dizziness 9/27/2012    Elevated prostate specific antigen (PSA) 4/22/2011    Essential hypertension, benign 4/22/2011    Mononucleosis age 49    caused spleen to rupture    Pneumothorax     Pure hypercholesterolemia 4/22/2011    Spinal stenosis of lumbar region without neurogenic claudication 10/31/2017    Mild to moderate L4-5    Ventricular tachycardia ([address] 10/2012       Past Surgical History:   Procedure Laterality Date    CARDIAC DEFIBRILLATOR PLACEMENT  10/10/12    dual chamber ICD [employer]    CATARACT REMOVAL WITH IMPLANT  08/2018    CHEST TUBE INSERTION      OTHER SURGICAL HISTORY  10/2012    insertion of cardiac recorder    SPLENECTOMY          Family History   Problem Relation Age of Onset    Cancer Mother     Alzheimer's Disease Father     Cancer Brother         melanoma    Cancer Brother     Heart Disease Neg Hx     High Blood Pressure Neg Hx     High Cholesterol Neg Hx        Social History     Tobacco Use    Smoking status: Former Smoker     Packs/day: 1.50     Years: 35.00     Pack years: 52.50     Start date: 1958     Quit date: 1991     Years since quittin.4    Smokeless tobacco: Never Used   Vaping Use    Vaping Use: Never used   Substance Use Topics    Alcohol use: No    Drug use: No            Review of Systems  Review of Systems    Objective:   Physical Exam  Vitals:    22 1224 22 1302   BP: 136/64 128/68   Pulse: 60    Resp: 15    Temp: (!) 96.2 °F (35.7 °C)    SpO2: 97%    Weight: 159 lb 3.2 oz (72.2 kg)      Wt Readings from Last 3 Encounters:   22 159 lb 3.2 oz (72.2 kg)   21 161 lb 3.2 oz (73.1 kg)   21 158 lb 9.6 oz (71.9 kg)        Physical Exam    NAD    Skin is warm and dry. No rash. Well hydrated  Alert and oriented x 3. Mood and affect are normal.  The neck is supple and free of adenopathy or masses, the thyroid is normal without enlargement or nodules. Chest: clear with no wheezes or rales. No retractions, or use of accessory muscles noted. Cardiovascular: PMI is not displaced, and no thrill noted. Regular rate and rhythm with no rub, murmur or gallop. No peripheral edema. No carotid bruits. The abdomen is soft without tenderness, guarding, mass, rebound or organomegaly. Aorta, femoral, DP and PT pulses intact. Assessment:       Diagnosis Orders   1. HTN (hypertension), benign  amLODIPine (NORVASC) 5 MG tablet  Controlled; continue current rx. 2. Dyslipidemia  Comprehensive Metabolic Panel    TSH with Reflex    Lipid Panel  LDL goal < 130/80   3. Essential thrombocytosis ([facility]  CBC  Per Dr. Bacilio Lemus. 4. Age-related cognitive decline  May be related to sleep aide. - he agrees to stop   5. TIA (transient ischemic attack)  Continue asa, statin, BP control. 6. Fatigue, unspecified type  CBC with Auto Differential    7. Insomnia  - stop sleep aide. Consider Trazodone. He will try meditation and melatonin       Plan:      Side effects of current medications reviewed and questions answered. Follow up in 6 months or prn. 9

## 2022-05-27 ENCOUNTER — OFFICE VISIT (OUTPATIENT)
Dept: FAMILY MEDICINE CLINIC | Age: 82
End: 2022-05-27
Payer: MEDICARE

## 2022-05-27 VITALS
DIASTOLIC BLOOD PRESSURE: 68 MMHG | HEART RATE: 60 BPM | SYSTOLIC BLOOD PRESSURE: 128 MMHG | RESPIRATION RATE: 15 BRPM | BODY MASS INDEX: 24.21 KG/M2 | TEMPERATURE: 96.2 F | WEIGHT: 159.2 LBS | OXYGEN SATURATION: 97 %

## 2022-05-27 DIAGNOSIS — R53.83 FATIGUE, UNSPECIFIED TYPE: ICD-10-CM

## 2022-05-27 DIAGNOSIS — G45.9 TIA (TRANSIENT ISCHEMIC ATTACK): ICD-10-CM

## 2022-05-27 DIAGNOSIS — D47.3 ESSENTIAL THROMBOCYTOSIS (HCC): ICD-10-CM

## 2022-05-27 DIAGNOSIS — R41.81 AGE-RELATED COGNITIVE DECLINE: ICD-10-CM

## 2022-05-27 DIAGNOSIS — I10 HTN (HYPERTENSION), BENIGN: Primary | ICD-10-CM

## 2022-05-27 DIAGNOSIS — E78.5 DYSLIPIDEMIA: ICD-10-CM

## 2022-05-27 PROCEDURE — 99214 OFFICE O/P EST MOD 30 MIN: CPT | Performed by: FAMILY MEDICINE

## 2022-05-27 PROCEDURE — 1123F ACP DISCUSS/DSCN MKR DOCD: CPT | Performed by: FAMILY MEDICINE

## 2022-05-27 RX ORDER — AMLODIPINE BESYLATE 5 MG/1
TABLET ORAL
Qty: 90 TABLET | Refills: 1 | Status: SHIPPED | OUTPATIENT
Start: 2022-05-27

## 2022-05-27 RX ORDER — HYDROXYUREA 500 MG/1
500 CAPSULE ORAL DAILY
COMMUNITY
Start: 2021-12-03

## 2022-05-27 ASSESSMENT — PATIENT HEALTH QUESTIONNAIRE - PHQ9
3. TROUBLE FALLING OR STAYING ASLEEP: 0
9. THOUGHTS THAT YOU WOULD BE BETTER OFF DEAD, OR OF HURTING YOURSELF: 0
SUM OF ALL RESPONSES TO PHQ9 QUESTIONS 1 & 2: 6
8. MOVING OR SPEAKING SO SLOWLY THAT OTHER PEOPLE COULD HAVE NOTICED. OR THE OPPOSITE, BEING SO FIGETY OR RESTLESS THAT YOU HAVE BEEN MOVING AROUND A LOT MORE THAN USUAL: 0
2. FEELING DOWN, DEPRESSED OR HOPELESS: 3
4. FEELING TIRED OR HAVING LITTLE ENERGY: 0
7. TROUBLE CONCENTRATING ON THINGS, SUCH AS READING THE NEWSPAPER OR WATCHING TELEVISION: 0
1. LITTLE INTEREST OR PLEASURE IN DOING THINGS: 3
SUM OF ALL RESPONSES TO PHQ QUESTIONS 1-9: 6
SUM OF ALL RESPONSES TO PHQ QUESTIONS 1-9: 6
5. POOR APPETITE OR OVEREATING: 0
10. IF YOU CHECKED OFF ANY PROBLEMS, HOW DIFFICULT HAVE THESE PROBLEMS MADE IT FOR YOU TO DO YOUR WORK, TAKE CARE OF THINGS AT HOME, OR GET ALONG WITH OTHER PEOPLE: 1
6. FEELING BAD ABOUT YOURSELF - OR THAT YOU ARE A FAILURE OR HAVE LET YOURSELF OR YOUR FAMILY DOWN: 0
SUM OF ALL RESPONSES TO PHQ QUESTIONS 1-9: 6
SUM OF ALL RESPONSES TO PHQ QUESTIONS 1-9: 6

## 2022-05-31 ENCOUNTER — NURSE ONLY (OUTPATIENT)
Dept: CARDIOLOGY CLINIC | Age: 82
End: 2022-05-31
Payer: MEDICARE

## 2022-05-31 DIAGNOSIS — I47.20 VT (VENTRICULAR TACHYCARDIA): ICD-10-CM

## 2022-05-31 DIAGNOSIS — Z95.810 CARDIAC DEFIBRILLATOR IN PLACE: ICD-10-CM

## 2022-05-31 PROCEDURE — 93295 DEV INTERROG REMOTE 1/2/MLT: CPT | Performed by: INTERNAL MEDICINE

## 2022-05-31 PROCEDURE — 93296 REM INTERROG EVL PM/IDS: CPT | Performed by: INTERNAL MEDICINE

## 2022-06-01 LAB
A/G RATIO: 1.6 (ref 1.2–2.2)
ALBUMIN SERPL-MCNC: 4.1 G/DL (ref 3.6–4.6)
ALP BLD-CCNC: 87 IU/L (ref 44–121)
ALT SERPL-CCNC: 20 IU/L (ref 0–44)
AMBIGUOUS ABBREVIATION: NORMAL
AMBIGUOUS ABBREVIATION: NORMAL
AST SERPL-CCNC: 19 IU/L (ref 0–40)
BASOPHILS ABSOLUTE: 0.1 X10E3/UL (ref 0–0.2)
BASOPHILS RELATIVE PERCENT: 1 %
BILIRUB SERPL-MCNC: 1 MG/DL (ref 0–1.2)
BUN / CREAT RATIO: 23 (ref 10–24)
BUN BLDV-MCNC: 22 MG/DL (ref 8–27)
CALCIUM SERPL-MCNC: 9.6 MG/DL (ref 8.6–10.2)
CHLORIDE BLD-SCNC: 103 MMOL/L (ref 96–106)
CHOLESTEROL, TOTAL: 147 MG/DL (ref 100–199)
CO2: 20 MMOL/L (ref 20–29)
COMMENT: ABNORMAL
CREAT SERPL-MCNC: 0.95 MG/DL (ref 0.76–1.27)
EOSINOPHILS ABSOLUTE: 0.2 X10E3/UL (ref 0–0.4)
EOSINOPHILS RELATIVE PERCENT: 2 %
ERYTHROCYTES, NUCLEATED/100 LEU: ABNORMAL
ESTIMATED GLOMERULAR FILTRATION RATE CREATININE EQUATION: 80 ML/MIN/1.73
GLOBULIN: 2.5 G/DL (ref 1.5–4.5)
GLUCOSE BLD-MCNC: 94 MG/DL (ref 65–99)
HCT VFR BLD CALC: 47.5 % (ref 37.5–51)
HDLC SERPL-MCNC: 36 MG/DL
HEMOGLOBIN: 16.1 G/DL (ref 13–17.7)
IMMATURE CELLS ABSOLUTE COUNT: ABNORMAL
IMMATURE GRANS (ABS): 0 X10E3/UL (ref 0–0.1)
IMMATURE GRANULOCYTES: 0 %
LDL CHOLESTEROL CALCULATED: 91 MG/DL (ref 0–99)
LYMPHOCYTES ABSOLUTE: 2.6 X10E3/UL (ref 0.7–3.1)
LYMPHOCYTES RELATIVE PERCENT: 25 %
MCH RBC QN AUTO: 34.5 PG (ref 26.6–33)
MCHC RBC AUTO-ENTMCNC: 33.9 G/DL (ref 31.5–35.7)
MCV RBC AUTO: 102 FL (ref 79–97)
MONOCYTES ABSOLUTE: 1.2 X10E3/UL (ref 0.1–0.9)
MONOCYTES RELATIVE PERCENT: 12 %
MORPHOLOGY: ABNORMAL
NEUTROPHILS ABSOLUTE: 6.2 X10E3/UL (ref 1.4–7)
PDW BLD-RTO: 14.7 % (ref 11.6–15.4)
PLATELET # BLD: 620 X10E3/UL (ref 150–450)
POTASSIUM SERPL-SCNC: 5 MMOL/L (ref 3.5–5.2)
RBC # BLD: 4.66 X10E6/UL (ref 4.14–5.8)
SEGMENTED NEUTROPHILS RELATIVE PERCENT: 60 %
SODIUM BLD-SCNC: 140 MMOL/L (ref 134–144)
TOTAL PROTEIN: 6.6 G/DL (ref 6–8.5)
TRIGL SERPL-MCNC: 107 MG/DL (ref 0–149)
TSH SERPL DL<=0.05 MIU/L-ACNC: 3.31 UIU/ML (ref 0.45–4.5)
VLDLC SERPL CALC-MCNC: 20 MG/DL (ref 5–40)
WBC # BLD: 10.3 X10E3/UL (ref 3.4–10.8)

## 2022-07-11 ENCOUNTER — TELEPHONE (OUTPATIENT)
Dept: FAMILY MEDICINE CLINIC | Age: 82
End: 2022-07-11

## 2022-07-11 ENCOUNTER — NURSE ONLY (OUTPATIENT)
Dept: CARDIOLOGY CLINIC | Age: 82
End: 2022-07-11
Payer: MEDICARE

## 2022-07-11 ENCOUNTER — OFFICE VISIT (OUTPATIENT)
Dept: CARDIOLOGY CLINIC | Age: 82
End: 2022-07-11
Payer: MEDICARE

## 2022-07-11 VITALS
WEIGHT: 158 LBS | SYSTOLIC BLOOD PRESSURE: 133 MMHG | DIASTOLIC BLOOD PRESSURE: 69 MMHG | OXYGEN SATURATION: 95 % | BODY MASS INDEX: 23.95 KG/M2 | HEIGHT: 68 IN | HEART RATE: 60 BPM

## 2022-07-11 DIAGNOSIS — Z95.810 AUTOMATIC IMPLANTABLE CARDIOVERTER-DEFIBRILLATOR IN SITU: Chronic | ICD-10-CM

## 2022-07-11 DIAGNOSIS — I47.20 VT (VENTRICULAR TACHYCARDIA): ICD-10-CM

## 2022-07-11 DIAGNOSIS — I48.91 ATRIAL FIBRILLATION, UNSPECIFIED TYPE (HCC): Primary | ICD-10-CM

## 2022-07-11 PROCEDURE — 1123F ACP DISCUSS/DSCN MKR DOCD: CPT | Performed by: NURSE PRACTITIONER

## 2022-07-11 PROCEDURE — 93283 PRGRMG EVAL IMPLANTABLE DFB: CPT | Performed by: INTERNAL MEDICINE

## 2022-07-11 PROCEDURE — 99214 OFFICE O/P EST MOD 30 MIN: CPT | Performed by: NURSE PRACTITIONER

## 2022-07-11 NOTE — PROGRESS NOTES
[facility]   Electrophysiology  Pat Adorno, APRN-CNP  Attending EP: Dr. Chilo Quintana   Date: 7/11/2022  I had the privilege of visiting Amanda Regan in the office. Chief Complaint:   Chief Complaint   Patient presents with    1 Year Follow Up     Pt reports no cardiac concerns.  Atrial Fibrillation     History of Present Illness: History obtained from patient and medical record. Amanda Regan is 81 y.o. male with a past medical history of hypertension, hyperlipidemia, dementia, CAD, and ventricular tachycardia    Interval history: Today, Amanda Regan is being seen for ventricular tachycardia, hypertension, device monitoring. He is feeling well from cardiac perspective. He remains active gardening and caring for his grandchildren. Denies any cardiac symptoms. No CP, SOB, palpitations, swelling. Device interrogation shows no arrhythmia. With normal lead and KRISTINA  9.4 yrs. He does not monitor HR or BP at home, controlled today. Denies having chest pain, palpitations, shortness of breath, orthopnea or dizziness at the time of this visit. With regard to medication therapy the patient has been compliant with prescribed regimen. He has tolerated therapy to date.      Assessment:  ventricular tachycardia/Implantable device   - S/p dual chamber AICD   - On sotalol 80 mg bid (QTc 434)   - The CIED was interrogated and I reviewed all data    - Device interrogation today shows KRISTINA 9.4 yrs, AT/AF 0, AP 12.6,  <1%  CAD   - Mild nonobstructive per [facility] 9/28/2012   - No reports of angina   - Continue ASA, statin, metoprolol  HTN-goal <130/80   - Controlled   - Continue current medications   - Encouraged patient to check BP at home, log and bring to office visits  - Discussed lifestyle modifications, weight loss, low sodium diet     Hyperlipidemia   - Continue statin  Plan  - No medication changes  - Call office if symptoms develop  - Check your blood pressure at home, if your top number blood pressure is >140/90 or <95/50 please call the office    F/U: Follow-up with EP in 1 year with RMM  Call [address] at [phone] with any questions    Allergies:  No Known Allergies  Home Medications:  Prior to Visit Medications    Medication Sig Taking? Authorizing Provider   hydroxyurea (HYDREA) 500 MG chemo capsule Take 500 mg by mouth daily  Historical Provider, MD   amLODIPine (NORVASC) 5 MG tablet TAKE 1 TABLET BY MOUTH EVERY DAY  Sobeida Romero MD   metoprolol tartrate (LOPRESSOR) 25 MG tablet TAKE 1/2 TABLET TWICE A DAY  JOSE Motta CNP   sotalol (BETAPACE) 80 MG tablet TAKE 1 TABLET TWICE A DAY  JOSE Motta CNP   losartan (COZAAR) 100 MG tablet TAKE 1 TABLET DAILY  Sobeida Romero MD   simvastatin (ZOCOR) 20 MG tablet TAKE 1 TABLET EVERY EVENING  Sobeida Roemro MD   Multiple Vitamins-Minerals (MULTIVITAMIN ADULT PO) Take by mouth  Historical Provider, MD   aspirin 81 MG EC tablet Take 81 mg by mouth daily. Historical Provider, MD      Past Medical History:  Past Medical History:   Diagnosis Date    Blood circulation, collateral     carotid artery occlusion    BPH w/o urinary obs/LUTS 4/22/2011    CAD (coronary artery disease)     Carotid 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 4/22/2011    Dementia due to Alzheimer's disease ([facility] [address]     Dizziness 9/27/2012    Elevated prostate specific antigen (PSA) 4/22/2011    Essential hypertension, benign 4/22/2011    Mononucleosis age 49    caused spleen to rupture    Pneumothorax     Pure hypercholesterolemia 4/22/2011    Spinal stenosis of lumbar region without neurogenic claudication 10/31/2017    Mild to moderate L4-5    Ventricular tachycardia ([address] 10/2012     Past Surgical History:    has a past surgical history that includes Splenectomy; chest tube insertion; other surgical history (10/2012); Cardiac defibrillator placement (10/10/12); and Cataract removal with implant (08/2018). Social History:  Reviewed. reports that he quit smoking about 31 years ago. He started smoking about 64 years ago. He has a 52.50 pack-year smoking history. He has never used smokeless tobacco. He reports that he does not drink alcohol and does not use drugs. Family History:  Reviewed. family history includes Alzheimer's Disease in his father; Cancer in his brother, brother, and mother. Denies family history of sudden cardiac death, arrhythmia, premature CAD    Review of System:  · Constitutional: No weight changes or weakness  · HEENT: No visual changes. No mouth sores or sore throat. · Cardiovascular: denies chest pain, denies dyspnea on exertion, denies palpitations or denies loss of consciousness. No cough, hemoptysis, denies pleuritic pain, or phlebitis. denies dizziness. · Respiratory: denies cough or wheezing. · Gastrointestinal: Negative, No blood in stools. · Genitourinary: No hematuria. · Neurological: No focal weakness  · Psychiatric: No confusion, anxiety, or depression. · Hem/Lymph: Denies abnormal bruising or bleeding. Physical Examination:  There were no vitals filed for this visit. Wt Readings from Last 3 Encounters:   05/27/22 159 lb 3.2 oz (72.2 kg)   11/08/21 161 lb 3.2 oz (73.1 kg)   08/23/21 158 lb 9.6 oz (71.9 kg)      Constitutional: Cooperative and in no apparent distress, and appears well nourished   Skin: Warm and pink; no cyanosis or bruising   HEENT: Symmetric and normocephalic. Conjunctiva pink with clear sclera. Mucus membranes pink and moist. No visible masses/goiter   Respiratory: Respirations symmetric and unlabored. Lungs clear to auscultation bilaterally, no wheezing, rhonchi, or crackles.  Cardiovascular:  regular rate and rhythm. S1 & S2 present, negative for murmur. negative elevation of JVP. No peripheral edema.  Musculoskeletal:  No focal weakness.  Neurological/Psych: Awake and orientated to person, place and time. Calm affect, appropriate mood.      Pertinent labs, diagnostic, device, and imaging results reviewed as a part of this visit    LABS    CBC:   Lab Results   Component Value Date    WBC 10.3 2022    HGB 16.1 2022    HCT 47.5 2022     (H) 2022     (H) 2022     BMP:   Lab Results   Component Value Date    CREATININE 0.95 2022    BUN 22 2022     2022    K 5.0 2022     2022    CO2 20 2022     CrCl cannot be calculated (Unknown ideal weight.). No results found for: BNP    Thyroid:   Lab Results   Component Value Date    TSH 3.310 2022     Lipid Panel:   Lab Results   Component Value Date/Time    CHOL 147 2022 07:41 AM    HDL 36 2022 07:41 AM    TRIG 107 2022 07:41 AM     LFTs:  Lab Results   Component Value Date    ALT 20 2022    AST 19 2022    ALKPHOS 87 2022    BILITOT 1.0 2022     Coags:   Lab Results   Component Value Date    PROTIME 11.5 2020    INR 0.99 2020     EC2022 AP HR 60, , QRS 96, QTc 434    Echo: 2020   Summary   -Normal left ventricle size, wall thickness, and systolic function with an estimated ejection fraction of 55-60%. -No regional wall motion abnormalities are seen. -E/e=16.3.   -Indeterminate diastolic function. -Mild mitral regurgitation.   -A bubble study was performed and fails to show evidence of shunting.   -Pacer / ICD wire is visualized in the right Heart.   -Mild tricuspid regurgitation. -PASP of 44 mmHg. GXT: none    Cath: none    Diet & Exercise:   The patient is counseled to follow a low salt diet to assure blood pressure remains controlled for cardiovascular risk factor modification   The patient is counseled to avoid excess caffeine, and energy drinks as this may exacerbated ectopy and arrhythmia   The patient is counseled to lose weight to control cardiovascular risk factors   Exercise program discussed:  To improve overall cardiovascular health, the patient is instructed to increase cardiovascular related activities with a goal of 150 min/week of moderate level activity or 10,000 steps per day. Encouraged to perform as much activity as tolerated     I have addressed the patient's cardiac risk factors and adjusted pharmacologic treatment as needed. In addition, I have reinforced the need for patient directed risk factor modification. I independently reviewed the device check interrogation and ECG    All questions and concerns were addressed with the patient. Alternatives to treatment were discussed. Thank you for allowing to us to participate in the care of Jatinder Pino.     JOSE Veras-CNP  [facility]   Office: [phone]

## 2022-07-11 NOTE — TELEPHONE ENCOUNTER
Patient was speaking to our billing department concerning a bill for  $409; vaccines and told him to contact the office concerning these charges. Patient would like to speak with manager concerning this issues. Please call [phone].  Thanks

## 2022-07-11 NOTE — PROGRESS NOTES
Patient comes in for programming evaluation for his defibrillator. All sensing and pacing parameters are within normal range. Battery life 9.4 years  AP 12.6%.  <0.1%. No episodes noted. Patient remains on sotalol and metoprolol. No changes need to be made at this time. Please see interrogation for more detail. Optivol is within normal range. Patient will see Bert Lancaster today and follow up in 3 months in office or remotely.

## 2022-07-11 NOTE — PATIENT INSTRUCTIONS
- No medication changes  - Call office if symptoms develop  - Check your blood pressure at home, if your top number blood pressure is >140/90 or <95/50 please call the office  - Follow up in 1 year or sooner if needed

## 2022-07-12 NOTE — TELEPHONE ENCOUNTER
Called and spoke to the patient. Advised for him to bring a Copy of the Bills to the Office. I can copy and send to our Billing Department for some more help.

## 2022-08-02 RX ORDER — LOSARTAN POTASSIUM 100 MG/1
TABLET ORAL
Qty: 90 TABLET | Refills: 2 | Status: SHIPPED | OUTPATIENT
Start: 2022-08-02

## 2022-08-02 NOTE — TELEPHONE ENCOUNTER
Future Appointments   Date Time Provider Carlos Garrido   8/30/2022 12:30 PM SCHEDULE, [place] REMOTE TRANSMISSION  Cardio [facility]   11/29/2022 12:30 PM SCHEDULE, [place] REMOTE TRANSMISSION FF Cardio [facility]   Lov 5/27/2022  Last lab 05/31/2022

## 2022-08-30 ENCOUNTER — NURSE ONLY (OUTPATIENT)
Dept: CARDIOLOGY CLINIC | Age: 82
End: 2022-08-30
Payer: MEDICARE

## 2022-08-30 DIAGNOSIS — Z95.810 CARDIAC DEFIBRILLATOR IN PLACE: ICD-10-CM

## 2022-08-30 DIAGNOSIS — I47.20 VT (VENTRICULAR TACHYCARDIA): Primary | ICD-10-CM

## 2022-08-30 PROCEDURE — 93295 DEV INTERROG REMOTE 1/2/MLT: CPT | Performed by: INTERNAL MEDICINE

## 2022-08-30 PROCEDURE — 93296 REM INTERROG EVL PM/IDS: CPT | Performed by: INTERNAL MEDICINE

## 2022-08-30 NOTE — PROGRESS NOTES
We received remote transmission from patient's monitor at home. Transmission shows normal sensing and pacing function. EP physician will review. See interrogation under cardiology tab in the [address] [place] [address] [address] field for more details. Total  < 0.1% (MVP On)  AS-VS 73.4%  AS- < 0.1%    Optivol is within normal range. End of 91-day monitoring period 8-30-22.

## 2022-11-10 DIAGNOSIS — I10 HTN (HYPERTENSION), BENIGN: ICD-10-CM

## 2022-11-10 NOTE — TELEPHONE ENCOUNTER
Requested Prescriptions     Pending Prescriptions Disp Refills    amLODIPine (NORVASC) 5 MG tablet [Pharmacy Med Name: AMLODIPINE TAB 5MG] 30 tablet 0     Sig: TAKE 1 TABLET DAILY          Number: 30    Refills: 0    Last Office Visit: 5/27/2022     Next Office Visit: Visit date not found     Last Refill: 5/27/22    Last Labs: 5/31/22       My chart message sent to pt to make an appointment.

## 2022-11-11 RX ORDER — AMLODIPINE BESYLATE 5 MG/1
TABLET ORAL
Qty: 90 TABLET | Refills: 1 | Status: SHIPPED | OUTPATIENT
Start: 2022-11-11

## 2022-11-29 ENCOUNTER — NURSE ONLY (OUTPATIENT)
Dept: CARDIOLOGY CLINIC | Age: 82
End: 2022-11-29
Payer: MEDICARE

## 2022-11-29 DIAGNOSIS — Z95.810 CARDIAC DEFIBRILLATOR IN PLACE: ICD-10-CM

## 2022-11-29 DIAGNOSIS — I47.20 VT (VENTRICULAR TACHYCARDIA): Primary | ICD-10-CM

## 2022-11-29 PROCEDURE — 93296 REM INTERROG EVL PM/IDS: CPT | Performed by: INTERNAL MEDICINE

## 2022-11-29 PROCEDURE — 93295 DEV INTERROG REMOTE 1/2/MLT: CPT | Performed by: INTERNAL MEDICINE

## 2022-11-29 NOTE — PROGRESS NOTES
We received remote transmission from patient's monitor at home. Transmission shows normal sensing and pacing function. EP physician will review. See interrogation under cardiology tab in the [address] [place] [address] [address] field for more details. Total  < 0.1% (MVP On)  AS-VS 86.9%  AS- < 0.1%  AP-VS 13.1%  AP- < 0.1%    Optivol is within normal range. End of 91-day monitoring period 11-29-22.

## 2023-01-13 DIAGNOSIS — E78.2 MIXED HYPERLIPIDEMIA: Chronic | ICD-10-CM

## 2023-01-13 RX ORDER — SIMVASTATIN 20 MG
TABLET ORAL
Qty: 90 TABLET | Refills: 1 | Status: SHIPPED | OUTPATIENT
Start: 2023-01-13

## 2023-01-13 NOTE — TELEPHONE ENCOUNTER
Requested Prescriptions     Pending Prescriptions Disp Refills    simvastatin (ZOCOR) 20 MG tablet [Pharmacy Med Name: SIMVASTATIN  TAB 20MG] 90 tablet 3     Sig: TAKE 1 TABLET EVERY EVENING         Last OV; 5/27/2022   Last labs; 5/31/2022  No F/u       Sent an message to pt reminding him he is overdue for an office visit.

## 2023-01-21 PROBLEM — I48.91 ATRIAL FIBRILLATION, UNSPECIFIED TYPE (HCC): Status: ACTIVE | Noted: 2023-01-21

## 2023-01-21 NOTE — PROGRESS NOTES
Subjective:      Patient ID: Tova King 81 y.o. male. The primary encounter diagnosis was HTN (hypertension), benign. 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 HPI    Hypertension/atrial fillation:   He had an elevated BP at Dr. Carlin Slater a few weeks ago so he started to check it at  home. Patient here for follow-up of elevated blood pressure. He is exercising and is adherent to low salt diet. Blood pressure is not well controlled at home. 147/76 - 160/88. Cardiac symptoms none. Patient denies chest pressure/discomfort, dyspnea, exertional chest pressure/discomfort, lower extremity edema, near-syncope, and palpitations. Cardiovascular risk factors: advanced age (older than 54 for men, 72 for women), dyslipidemia, hypertension, and male gender. Use of agents associated with hypertension: none. History of target organ damage: angina/ prior myocardial infarction. Thrombocytosis:  managed by hematology, Dr. Tayler Melendez. Last appt 1/3/23. Continues to take Hydrea. Dr. Tayler Melendez increase the Hydroxurea. ASPLENIA:  due meningitis B booster. Right shoulder pain with certain movements. No loss of ROM. Denies injury or trauma. He also notes some left neck pain when turns to the left. No radicular pain, weakness or numbness. Occ takes aspirin, helps.        Lab Results   Component Value Date     05/31/2022    K 5.0 05/31/2022     05/31/2022    CO2 20 05/31/2022    BUN 22 05/31/2022    CREATININE 0.95 05/31/2022    GLUCOSE 94 05/31/2022    CALCIUM 9.6 05/31/2022    PROT 6.6 05/31/2022    LABALBU 4.1 05/31/2022    BILITOT 1.0 05/31/2022    ALKPHOS 87 05/31/2022    AST 19 05/31/2022    ALT 20 05/31/2022    LABGLOM 76 04/22/2021    GFRAA 88 04/22/2021    AGRATIO 1.6 05/31/2022    GLOB 2.5 05/31/2022       LABS 1/6/23   H/h 15.3/44.8  Plts 645  WBC: 11.8  Cr: 0.76  K 3.7   Na 140  LFTs normal  Lab Results   Component Value Date    WBC 10.3 05/31/2022    HGB 16.1 05/31/2022    HCT 47.5 05/31/2022     (H) 05/31/2022     (H) 05/31/2022     Lab Results   Component Value Date    IRON 235 (H) 07/06/2020    TIBC 245 (L) 07/06/2020    FERRITIN 881.9 (H) 07/06/2020     Lab Results   Component Value Date    JVHFPEGT33 219 07/06/2020      Vitamin B12 9/17/21  1149    TSH   Date Value Ref Range Status   05/31/2022 3.310 0.450 - 4.500 uIU/mL Final   04/22/2021 3.930 0.450 - 4.500 uIU/mL Final   07/02/2020 3.840 0.450 - 4.500 uIU/mL Final         Outpatient Medications Marked as Taking for the 1/23/23 encounter (Office Visit) with Meryl Mckeon MD   Medication Sig Dispense Refill    simvastatin (ZOCOR) 20 MG tablet TAKE 1 TABLET EVERY EVENING 90 tablet 1    amLODIPine (NORVASC) 5 MG tablet TAKE 1 TABLET DAILY 90 tablet 1    losartan (COZAAR) 100 MG tablet TAKE 1 TABLET DAILY 90 tablet 2    hydroxyurea (HYDREA) 500 MG chemo capsule Take 500 mg by mouth daily      metoprolol tartrate (LOPRESSOR) 25 MG tablet TAKE 1/2 TABLET TWICE A DAY 90 tablet 3    sotalol (BETAPACE) 80 MG tablet TAKE 1 TABLET TWICE A  tablet 3    Multiple Vitamins-Minerals (MULTIVITAMIN ADULT PO) Take by mouth      aspirin 81 MG EC tablet Take 81 mg by mouth daily.           No Known Allergies    Patient Active Problem List   Diagnosis    HTN (hypertension), benign    Hypertrophy of prostate without urinary obstruction and other lower urinary tract symptoms (LUTS)    Occlusion and stenosis of carotid artery    VT (ventricular tachycardia)    Automatic implantable cardioverter-defibrillator in situ    CAD (coronary artery disease)    Hypomagnesemia    Spinal stenosis of lumbar region without neurogenic claudication    Cortical age-related cataract of both eyes    TIA (transient ischemic attack)    Dyslipidemia    Vitamin B12 deficiency disease    Age-related cognitive decline    Essential thrombocytosis (HCC)    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    Olecranon bursitis, right elbow    Atrial fibrillation, unspecified type [facility] [place] [facility])       Past Medical History:   Diagnosis Date    Blood circulation, collateral     carotid artery occlusion    BPH w/o urinary obs/LUTS 2011    CAD (coronary artery disease)     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    Dementia due to Alzheimer's disease ([facility] [address]     Dizziness 2012    Elevated prostate specific antigen (PSA) 2011    Essential hypertension, benign 2011    Mononucleosis age 49    caused spleen to rupture    Pneumothorax     Pure hypercholesterolemia 2011    Spinal stenosis of lumbar region without neurogenic claudication 10/31/2017    Mild to moderate L4-5    Ventricular tachycardia ([facility] [address] 10/2012       Past Surgical History:   Procedure Laterality Date    CARDIAC DEFIBRILLATOR PLACEMENT  10/10/12    dual chamber ICD Medtronic    CATARACT REMOVAL WITH IMPLANT  2018    CHEST TUBE INSERTION      OTHER SURGICAL HISTORY  10/2012    insertion of cardiac recorder    SPLENECTOMY          Family History   Problem Relation Age of Onset    Cancer Mother     Alzheimer's Disease Father     Cancer Brother         melanoma    Cancer Brother     Heart Disease Neg Hx     High Blood Pressure Neg Hx     High Cholesterol Neg Hx        Social History     Tobacco Use    Smoking status: Former     Packs/day: 1.50     Years: 35.00     Pack years: 52.50     Types: Cigarettes     Start date: 1958     Quit date: 1991     Years since quittin.0    Smokeless tobacco: Never   Vaping Use    Vaping Use: Never used   Substance Use Topics    Alcohol use: No    Drug use: No            Review of Systems  Review of Systems    Objective:   Physical Exam  Vitals:    23 1136   BP: 136/71   Pulse: 70   Temp: 96.9 °F (36.1 °C)   SpO2: 98%   Weight: 161 lb 6.4 oz (73.2 kg)     Wt Readings from Last 3 Encounters:   23 161 lb 6.4 oz (73.2 kg)   07/11/22 158 lb (71.7 kg)   05/27/22 159 lb 3.2 oz (72.2 kg)        Physical Exam  NAD    Skin is warm and dry. No rash. Well hydrated  Alert and oriented x 3. Mood and affect are normal.  The neck is supple and free of adenopathy or masses, the thyroid is normal without enlargement or nodules. Chest: clear with no wheezes or rales. No retractions, or use of accessory muscles noted. Cardiovascular: PMI is not displaced, and no thrill noted. Regular rate and rhythm with no rub, murmur or gallop. No peripheral edema. No carotid bruits. The abdomen is soft without tenderness, guarding, mass, rebound or organomegaly. Aorta, femoral, DP and PT pulses intact. Mild loss of ROM right shoulder with no impingement. Motor 4+/5 abd, rotation. Assessment:       Diagnosis Orders   1. HTN (hypertension), benign  amLODIPine (NORVASC) 10 MG tablet    Comprehensive Metabolic Panel  Not at goal < 130/80  Increase Amlodipine  from 5 --> 10 mg. Follow up if pedal edema. BP check in weeks       2. Atrial fibrillation, unspecified type ([address]  Stable, continue with cardiology       3. Essential thrombocytosis (HCC)  CBC with Auto Differential      4. Need for meningococcal vaccination  [place]ero. 5. 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 Dyslipidemia  Comprehensive Metabolic Panel    TSH with Reflex    Lipid Panel  Continue statin        7. Tendonitis shoulder, right -  Exercises and informational handout reviewed and copy provided. Consider PT if HEP is not effective. Plan:      Side effects of current medications reviewed and questions answered. Follow up in 4 weeks or prn.

## 2023-01-23 ENCOUNTER — OFFICE VISIT (OUTPATIENT)
Dept: FAMILY MEDICINE CLINIC | Age: 83
End: 2023-01-23
Payer: MEDICARE

## 2023-01-23 VITALS
OXYGEN SATURATION: 98 % | SYSTOLIC BLOOD PRESSURE: 148 MMHG | WEIGHT: 161.4 LBS | DIASTOLIC BLOOD PRESSURE: 68 MMHG | TEMPERATURE: 96.9 F | BODY MASS INDEX: 24.54 KG/M2 | HEART RATE: 70 BPM

## 2023-01-23 DIAGNOSIS — Z90.81 ASPLENIA AFTER SURGICAL PROCEDURE: ICD-10-CM

## 2023-01-23 DIAGNOSIS — Z23 NEED FOR MENINGOCOCCAL VACCINATION: ICD-10-CM

## 2023-01-23 DIAGNOSIS — I48.91 ATRIAL FIBRILLATION, UNSPECIFIED TYPE (HCC): ICD-10-CM

## 2023-01-23 DIAGNOSIS — I10 HTN (HYPERTENSION), BENIGN: Primary | ICD-10-CM

## 2023-01-23 DIAGNOSIS — M77.8 TENDINITIS OF RIGHT SHOULDER: ICD-10-CM

## 2023-01-23 DIAGNOSIS — D47.3 ESSENTIAL THROMBOCYTOSIS (HCC): ICD-10-CM

## 2023-01-23 DIAGNOSIS — E78.5 DYSLIPIDEMIA: ICD-10-CM

## 2023-01-23 PROCEDURE — 3077F SYST BP >= 140 MM HG: CPT | Performed by: FAMILY MEDICINE

## 2023-01-23 PROCEDURE — 99214 OFFICE O/P EST MOD 30 MIN: CPT | Performed by: FAMILY MEDICINE

## 2023-01-23 PROCEDURE — 3078F DIAST BP <80 MM HG: CPT | Performed by: FAMILY MEDICINE

## 2023-01-23 PROCEDURE — 1123F ACP DISCUSS/DSCN MKR DOCD: CPT | Performed by: FAMILY MEDICINE

## 2023-01-23 RX ORDER — AMLODIPINE BESYLATE 10 MG/1
10 TABLET ORAL DAILY
Qty: 90 TABLET | Refills: 1 | Status: SHIPPED | OUTPATIENT
Start: 2023-01-23

## 2023-01-23 SDOH — ECONOMIC STABILITY: FOOD INSECURITY: WITHIN THE PAST 12 MONTHS, YOU WORRIED THAT YOUR FOOD WOULD RUN OUT BEFORE YOU GOT MONEY TO BUY MORE.: NEVER TRUE

## 2023-01-23 SDOH — ECONOMIC STABILITY: FOOD INSECURITY: WITHIN THE PAST 12 MONTHS, THE FOOD YOU BOUGHT JUST DIDN'T LAST AND YOU DIDN'T HAVE MONEY TO GET MORE.: NEVER TRUE

## 2023-01-23 ASSESSMENT — PATIENT HEALTH QUESTIONNAIRE - PHQ9
1. LITTLE INTEREST OR PLEASURE IN DOING THINGS: 0
SUM OF ALL RESPONSES TO PHQ QUESTIONS 1-9: 0
SUM OF ALL RESPONSES TO PHQ9 QUESTIONS 1 & 2: 0
2. FEELING DOWN, DEPRESSED OR HOPELESS: 0
SUM OF ALL RESPONSES TO PHQ QUESTIONS 1-9: 0

## 2023-01-23 ASSESSMENT — SOCIAL DETERMINANTS OF HEALTH (SDOH): HOW HARD IS IT FOR YOU TO PAY FOR THE VERY BASICS LIKE FOOD, HOUSING, MEDICAL CARE, AND HEATING?: NOT VERY HARD

## 2023-01-24 LAB
A/G RATIO: 1.6 (ref 1.2–2.2)
ALBUMIN SERPL-MCNC: 3.9 G/DL (ref 3.6–4.6)
ALP BLD-CCNC: 92 IU/L (ref 44–121)
ALT SERPL-CCNC: 17 IU/L (ref 0–44)
AMBIGUOUS ABBREVIATION: NORMAL
AMBIGUOUS ABBREVIATION: NORMAL
AST SERPL-CCNC: 16 IU/L (ref 0–40)
BASOPHILS ABSOLUTE: 0.1 X10E3/UL (ref 0–0.2)
BASOPHILS RELATIVE PERCENT: 1 %
BILIRUB SERPL-MCNC: 0.5 MG/DL (ref 0–1.2)
BUN / CREAT RATIO: 24 (ref 10–24)
BUN BLDV-MCNC: 19 MG/DL (ref 8–27)
CALCIUM SERPL-MCNC: 8.9 MG/DL (ref 8.6–10.2)
CHLORIDE BLD-SCNC: 104 MMOL/L (ref 96–106)
CHOLESTEROL, TOTAL: 141 MG/DL (ref 100–199)
CO2: 23 MMOL/L (ref 20–29)
COMMENT: ABNORMAL
CREAT SERPL-MCNC: 0.78 MG/DL (ref 0.76–1.27)
EOSINOPHILS ABSOLUTE: 0.5 X10E3/UL (ref 0–0.4)
EOSINOPHILS RELATIVE PERCENT: 3 %
ERYTHROCYTES, NUCLEATED/100 LEU: ABNORMAL
ESTIMATED GLOMERULAR FILTRATION RATE CREATININE EQUATION: 89 ML/MIN/1.73
GLOBULIN: 2.4 G/DL (ref 1.5–4.5)
GLUCOSE BLD-MCNC: 83 MG/DL (ref 70–99)
HCT VFR BLD CALC: 44 % (ref 37.5–51)
HDLC SERPL-MCNC: 31 MG/DL
HEMOGLOBIN: 16 G/DL (ref 13–17.7)
IMMATURE CELLS ABSOLUTE COUNT: ABNORMAL
IMMATURE GRANS (ABS): 0 X10E3/UL (ref 0–0.1)
IMMATURE GRANULOCYTES: 0 %
LDL CHOLESTEROL CALCULATED: 54 MG/DL (ref 0–99)
LYMPHOCYTES ABSOLUTE: 2.5 X10E3/UL (ref 0.7–3.1)
LYMPHOCYTES RELATIVE PERCENT: 18 %
MCH RBC QN AUTO: 37.7 PG (ref 26.6–33)
MCHC RBC AUTO-ENTMCNC: 36.4 G/DL (ref 31.5–35.7)
MCV RBC AUTO: 104 FL (ref 79–97)
MONOCYTES ABSOLUTE: 1.4 X10E3/UL (ref 0.1–0.9)
MONOCYTES RELATIVE PERCENT: 10 %
MORPHOLOGY: ABNORMAL
NEUTROPHILS ABSOLUTE: 9.4 X10E3/UL (ref 1.4–7)
PDW BLD-RTO: 13.4 % (ref 11.6–15.4)
PLATELET # BLD: 594 X10E3/UL (ref 150–450)
POTASSIUM SERPL-SCNC: 4.4 MMOL/L (ref 3.5–5.2)
RBC # BLD: 4.24 X10E6/UL (ref 4.14–5.8)
SEGMENTED NEUTROPHILS RELATIVE PERCENT: 68 %
SODIUM BLD-SCNC: 143 MMOL/L (ref 134–144)
TOTAL PROTEIN: 6.3 G/DL (ref 6–8.5)
TRIGL SERPL-MCNC: 370 MG/DL (ref 0–149)
TSH SERPL DL<=0.05 MIU/L-ACNC: 2.9 UIU/ML (ref 0.45–4.5)
VLDLC SERPL CALC-MCNC: 56 MG/DL (ref 5–40)
WBC # BLD: 13.9 X10E3/UL (ref 3.4–10.8)

## 2023-03-01 ENCOUNTER — NURSE ONLY (OUTPATIENT)
Dept: CARDIOLOGY CLINIC | Age: 83
End: 2023-03-01
Payer: MEDICARE

## 2023-03-01 DIAGNOSIS — I47.20 VT (VENTRICULAR TACHYCARDIA): Primary | ICD-10-CM

## 2023-03-01 DIAGNOSIS — Z95.810 CARDIAC DEFIBRILLATOR IN PLACE: ICD-10-CM

## 2023-03-01 PROCEDURE — 93295 DEV INTERROG REMOTE 1/2/MLT: CPT | Performed by: INTERNAL MEDICINE

## 2023-03-01 PROCEDURE — 93296 REM INTERROG EVL PM/IDS: CPT | Performed by: INTERNAL MEDICINE

## 2023-03-01 NOTE — PROGRESS NOTES
We received remote transmission from patient's monitor at home. Transmission shows normal sensing and pacing function. EP physician will review. See interrogation under cardiology tab in the [address] [place] [address] [address] field for more details. Total  < 0.1% (MVP On)  AS-VS 83.5%  AS- < 0.1%  AP-VS 16.4%  AP- < 0.1%    Optivol is within normal range. End of 91-day monitoring period 3-1-23.

## 2023-05-02 ENCOUNTER — OFFICE VISIT (OUTPATIENT)
Dept: FAMILY MEDICINE CLINIC | Age: 83
End: 2023-05-02
Payer: MEDICARE

## 2023-05-02 VITALS
HEIGHT: 68 IN | WEIGHT: 156.6 LBS | OXYGEN SATURATION: 93 % | DIASTOLIC BLOOD PRESSURE: 70 MMHG | TEMPERATURE: 97.2 F | HEART RATE: 69 BPM | BODY MASS INDEX: 23.73 KG/M2 | SYSTOLIC BLOOD PRESSURE: 132 MMHG

## 2023-05-02 DIAGNOSIS — I10 HTN (HYPERTENSION), BENIGN: Primary | ICD-10-CM

## 2023-05-02 DIAGNOSIS — D47.3 ESSENTIAL THROMBOCYTOSIS (HCC): ICD-10-CM

## 2023-05-02 DIAGNOSIS — E78.2 HYPERLIPIDEMIA, MIXED: ICD-10-CM

## 2023-05-02 DIAGNOSIS — I25.10 CORONARY ARTERY DISEASE INVOLVING NATIVE CORONARY ARTERY OF NATIVE HEART WITHOUT ANGINA PECTORIS: ICD-10-CM

## 2023-05-02 DIAGNOSIS — I47.20 VT (VENTRICULAR TACHYCARDIA) (HCC): ICD-10-CM

## 2023-05-02 PROCEDURE — 99214 OFFICE O/P EST MOD 30 MIN: CPT | Performed by: FAMILY MEDICINE

## 2023-05-02 PROCEDURE — 1123F ACP DISCUSS/DSCN MKR DOCD: CPT | Performed by: FAMILY MEDICINE

## 2023-05-02 PROCEDURE — 3074F SYST BP LT 130 MM HG: CPT | Performed by: FAMILY MEDICINE

## 2023-05-02 PROCEDURE — 3078F DIAST BP <80 MM HG: CPT | Performed by: FAMILY MEDICINE

## 2023-05-02 RX ORDER — LOSARTAN POTASSIUM 100 MG/1
100 TABLET ORAL DAILY
Qty: 90 TABLET | Refills: 1 | Status: SHIPPED | OUTPATIENT
Start: 2023-05-02

## 2023-05-02 RX ORDER — AMLODIPINE BESYLATE 10 MG/1
10 TABLET ORAL DAILY
Qty: 90 TABLET | Refills: 1 | Status: SHIPPED | OUTPATIENT
Start: 2023-05-02

## 2023-05-02 RX ORDER — ROSUVASTATIN CALCIUM 20 MG/1
20 TABLET, COATED ORAL DAILY
Qty: 90 TABLET | Refills: 1 | Status: SHIPPED | OUTPATIENT
Start: 2023-05-02

## 2023-05-02 SDOH — ECONOMIC STABILITY: FOOD INSECURITY: WITHIN THE PAST 12 MONTHS, YOU WORRIED THAT YOUR FOOD WOULD RUN OUT BEFORE YOU GOT MONEY TO BUY MORE.: NEVER TRUE

## 2023-05-02 SDOH — ECONOMIC STABILITY: INCOME INSECURITY: HOW HARD IS IT FOR YOU TO PAY FOR THE VERY BASICS LIKE FOOD, HOUSING, MEDICAL CARE, AND HEATING?: NOT HARD AT ALL

## 2023-05-02 SDOH — ECONOMIC STABILITY: HOUSING INSECURITY
IN THE LAST 12 MONTHS, WAS THERE A TIME WHEN YOU DID NOT HAVE A STEADY PLACE TO SLEEP OR SLEPT IN A SHELTER (INCLUDING NOW)?: NO

## 2023-05-02 SDOH — ECONOMIC STABILITY: FOOD INSECURITY: WITHIN THE PAST 12 MONTHS, THE FOOD YOU BOUGHT JUST DIDN'T LAST AND YOU DIDN'T HAVE MONEY TO GET MORE.: NEVER TRUE

## 2023-05-02 ASSESSMENT — PATIENT HEALTH QUESTIONNAIRE - PHQ9
1. LITTLE INTEREST OR PLEASURE IN DOING THINGS: 0
SUM OF ALL RESPONSES TO PHQ QUESTIONS 1-9: 0
SUM OF ALL RESPONSES TO PHQ9 QUESTIONS 1 & 2: 0
2. FEELING DOWN, DEPRESSED OR HOPELESS: 0

## 2023-05-03 NOTE — PROGRESS NOTES
bilaterally  Heart: regular rate and rhythm, S1, S2 normal, no murmur, click, rub or gallop   Abdomen: soft, non-tender. Bowel sounds normal. No masses, no organomegaly   Extremities: extremities normal, atraumatic, no cyanosis or edema  Pulses: pedal pulses intact  Skin: Skin color, texture, turgor normal. No rashes or lesions  Neurologic: Alert and oriented X 3. No focal neurological deficits. Normal coordination and gait. Psychiatric: Patient has a normal mood and affect, behavior is normal. Judgment and thought content normal.            ASSESSMENT AND PLAN    Eliu Barry was seen today for new patient. Diagnoses and all orders for this visit:    HTN (hypertension), benign  -     amLODIPine (NORVASC) 10 MG tablet; Take 1 tablet by mouth daily TAKE 1 TABLET DAILY    Hyperlipidemia, mixed    VT (ventricular tachycardia) (HCC)    Coronary artery disease involving native coronary artery of native heart without angina pectoris    Essential thrombocytosis ([facility] [address]    Other orders  -     losartan (COZAAR) 100 MG tablet; Take 1 tablet by mouth daily  -     rosuvastatin (CRESTOR) 20 MG tablet; Take 1 tablet by mouth daily           DISCHARGE MEDICATION LIST        Medication List            Accurate as of May 2, 2023 11:59 PM. If you have any questions, ask your nurse or doctor.                 START taking these medications      rosuvastatin 20 MG tablet  Commonly known as: CRESTOR  Take 1 tablet by mouth daily  Started by: Triny Hammond MD            CONTINUE taking these medications      amLODIPine 10 MG tablet  Commonly known as: NORVASC  Take 1 tablet by mouth daily TAKE 1 TABLET DAILY     aspirin 81 MG EC tablet     hydroxyurea 500 MG chemo capsule  Commonly known as: HYDREA     losartan 100 MG tablet  Commonly known as: COZAAR  Take 1 tablet by mouth daily     metoprolol tartrate 25 MG tablet  Commonly known as: LOPRESSOR  TAKE 1/2 TABLET TWICE A DAY     MULTIVITAMIN ADULT PO     sotalol 80 MG tablet  Commonly known

## 2023-05-16 ENCOUNTER — TELEMEDICINE (OUTPATIENT)
Dept: FAMILY MEDICINE CLINIC | Age: 83
End: 2023-05-16
Payer: MEDICARE

## 2023-05-16 DIAGNOSIS — J01.80 OTHER ACUTE SINUSITIS, RECURRENCE NOT SPECIFIED: Primary | ICD-10-CM

## 2023-05-16 PROCEDURE — 99422 OL DIG E/M SVC 11-20 MIN: CPT | Performed by: FAMILY MEDICINE

## 2023-05-16 RX ORDER — AMOXICILLIN AND CLAVULANATE POTASSIUM 875; 125 MG/1; MG/1
1 TABLET, FILM COATED ORAL 2 TIMES DAILY WITH MEALS
Qty: 20 TABLET | Refills: 0 | Status: SHIPPED | OUTPATIENT
Start: 2023-05-16 | End: 2023-05-26

## 2023-05-16 NOTE — PROGRESS NOTES
identification was verified, and a caregiver was present when appropriate. The patient was located at Home: [address] [place] [address]  Provider was located at [address]): [phone] IGGY Suazo,  [address] [place] [address] Ave        Total time spent on this encounter: 15 minutes    --Lety Chinchilla MD on 5/16/2023 at 2:04 PM    An electronic signature was used to authenticate this note.

## 2023-05-31 ENCOUNTER — NURSE ONLY (OUTPATIENT)
Dept: CARDIOLOGY CLINIC | Age: 83
End: 2023-05-31

## 2023-06-05 ENCOUNTER — NURSE ONLY (OUTPATIENT)
Dept: CARDIOLOGY CLINIC | Age: 83
End: 2023-06-05
Payer: MEDICARE

## 2023-06-05 DIAGNOSIS — Z95.810 CARDIAC DEFIBRILLATOR IN PLACE: Primary | ICD-10-CM

## 2023-06-05 DIAGNOSIS — I47.20 VT (VENTRICULAR TACHYCARDIA) (HCC): ICD-10-CM

## 2023-06-05 PROCEDURE — 93295 DEV INTERROG REMOTE 1/2/MLT: CPT | Performed by: INTERNAL MEDICINE

## 2023-06-05 PROCEDURE — 93296 REM INTERROG EVL PM/IDS: CPT | Performed by: INTERNAL MEDICINE

## 2023-06-05 NOTE — PROGRESS NOTES
We received remote transmission from patient's monitor at home. Transmission shows normal sensing and pacing function. EP physician will review. See interrogation under cardiology tab in the [address] [place] [address] [address] field for more details. Total  < 0.1% (MVP On)  AS-VS 82.3%  AS- < 0.1%  AP-VS 17.6%  AP- < 0.1%    Optivol is within normal range. End of 91-day monitoring period 6-5-23.

## 2023-08-07 ENCOUNTER — OFFICE VISIT (OUTPATIENT)
Dept: FAMILY MEDICINE CLINIC | Age: 83
End: 2023-08-07
Payer: MEDICARE

## 2023-08-07 VITALS
HEIGHT: 68 IN | WEIGHT: 151.8 LBS | DIASTOLIC BLOOD PRESSURE: 64 MMHG | BODY MASS INDEX: 23.01 KG/M2 | TEMPERATURE: 97.1 F | SYSTOLIC BLOOD PRESSURE: 118 MMHG | OXYGEN SATURATION: 96 % | HEART RATE: 74 BPM

## 2023-08-07 DIAGNOSIS — R35.1 BENIGN PROSTATIC HYPERPLASIA WITH NOCTURIA: ICD-10-CM

## 2023-08-07 DIAGNOSIS — I10 HTN (HYPERTENSION), BENIGN: Chronic | ICD-10-CM

## 2023-08-07 DIAGNOSIS — F43.9 SITUATIONAL STRESS: ICD-10-CM

## 2023-08-07 DIAGNOSIS — Z71.89 ACP (ADVANCE CARE PLANNING): ICD-10-CM

## 2023-08-07 DIAGNOSIS — Z00.00 MEDICARE ANNUAL WELLNESS VISIT, SUBSEQUENT: Primary | ICD-10-CM

## 2023-08-07 DIAGNOSIS — N40.1 BENIGN PROSTATIC HYPERPLASIA WITH NOCTURIA: ICD-10-CM

## 2023-08-07 PROCEDURE — 3078F DIAST BP <80 MM HG: CPT | Performed by: FAMILY MEDICINE

## 2023-08-07 PROCEDURE — 1123F ACP DISCUSS/DSCN MKR DOCD: CPT | Performed by: FAMILY MEDICINE

## 2023-08-07 PROCEDURE — G0439 PPPS, SUBSEQ VISIT: HCPCS | Performed by: FAMILY MEDICINE

## 2023-08-07 PROCEDURE — 3074F SYST BP LT 130 MM HG: CPT | Performed by: FAMILY MEDICINE

## 2023-08-07 RX ORDER — TAMSULOSIN HYDROCHLORIDE 0.4 MG/1
0.4 CAPSULE ORAL DAILY
Qty: 30 CAPSULE | Refills: 5 | Status: SHIPPED | OUTPATIENT
Start: 2023-08-07

## 2023-08-07 ASSESSMENT — LIFESTYLE VARIABLES
HOW OFTEN DO YOU HAVE A DRINK CONTAINING ALCOHOL: NEVER
HOW MANY STANDARD DRINKS CONTAINING ALCOHOL DO YOU HAVE ON A TYPICAL DAY: PATIENT DOES NOT DRINK

## 2023-08-07 ASSESSMENT — PATIENT HEALTH QUESTIONNAIRE - PHQ9
SUM OF ALL RESPONSES TO PHQ QUESTIONS 1-9: 0
SUM OF ALL RESPONSES TO PHQ QUESTIONS 1-9: 0
1. LITTLE INTEREST OR PLEASURE IN DOING THINGS: 0
SUM OF ALL RESPONSES TO PHQ9 QUESTIONS 1 & 2: 0
SUM OF ALL RESPONSES TO PHQ QUESTIONS 1-9: 0
2. FEELING DOWN, DEPRESSED OR HOPELESS: 0
SUM OF ALL RESPONSES TO PHQ QUESTIONS 1-9: 0

## 2023-08-07 NOTE — PROGRESS NOTES
Medicare Annual Wellness Visit    Durenda Eisenmenger is here for Medicare AWV    Assessment & Plan   Medicare annual wellness visit, subsequent  ACP (advance care planning)  -     Ambulatory Referral to ACP Clinical Specialist  HTN (hypertension), benign  -     TSH; Future  -     CBC with Auto Differential; Future  -     Comprehensive Metabolic Panel; Future  -     Lipid Panel; Future  Situational stress  Benign prostatic hyperplasia with nocturia    Recommendations for Preventive Services Due: see orders and patient instructions/AVS.  Recommended screening schedule for the next 5-10 years is provided to the patient in written form: see Patient Instructions/AVS.     Return in 3 months (on 11/7/2023). Subjective   The following acute and/or chronic problems were also addressed today:    1. Medicare annual wellness visit, subsequent      Health Maintenance   Topic Date Due    Hepatitis A vaccine (1 of 2 - Risk 2-dose series) Never done    Hepatitis B vaccine (1 of 3 - Risk 3-dose series) Never done    Meningococcal B vaccine (3 of 4 - Increased Risk Bexsero 2-dose series) 06/16/2022    Flu vaccine (1) 08/01/2023    Lipids  01/23/2024    Depression Screen  05/02/2024    Annual Wellness Visit (AWV)  08/07/2024    Meningococcal (ACWY) vaccine (3 - Risk 2-dose series) 06/16/2026    DTaP/Tdap/Td vaccine (2 - Td or Tdap) 01/07/2030    Hib vaccine  Completed    Shingles vaccine  Completed    Pneumococcal 65+ years Vaccine  Completed    COVID-19 Vaccine  Completed     Patient Care Team:  Radha Shetty MD as PCP - General (Family Medicine)  Radha Shetty MD as PCP - Empaneled Provider  Heath Cancino MD as Consulting Physician (Electrophysiology)  Calvin Kirby MD as Consulting Physician (Otolaryngology)     Age-appropriate preventive healthcare recommendations discussed with the patient. Health maintenance reviewed and updated as follows.   Discussed routine and annual follow-ups with dermatologist, ophthalmologist, dentist

## 2023-08-08 ENCOUNTER — CLINICAL DOCUMENTATION (OUTPATIENT)
Dept: SPIRITUAL SERVICES | Age: 83
End: 2023-08-08

## 2023-08-08 NOTE — ACP (ADVANCE CARE PLANNING)
Advance Care Planning   Ambulatory ACP Specialist Patient Outreach    Date:  8/8/2023    ACP Specialist:  EZEQUIEL Phipps    Outreach call to patient in follow-up to ACP Specialist referral from:Soha Soriano MD    [x] PCP  [x] Provider   [] Ambulatory Care Management [] Other     For:                  [x] Advance Directive Assistance              [x] Complete Portable DNR order              [] Complete POST/POLST/MOST              [x] Code Status Discussion             [] Discuss Goals of Care             [] Early ACP Decision-Making              [] Other (Specify)    Date Referral Received:8/7/2023    Next Step:   [x] ACP scheduled conversation  [] Outreach again in one week               [] Email / Mail [address]  [] Email / Mail Advance Directive   [] Closing referral.  Routing closure to referring provider/staff and to ACP Specialist . [] Closure letter mailed to patient with invitation to contact ACP Specialist if / when ready. [] Other (Specify here):         [x] At this time, Healthcare Decision Maker Is:  Advance Care Planning   Healthcare Decision Maker:    Primary Decision Maker: Jhonathan Piper - [facility] - [phone]    Click here to complete Healthcare Decision Makers including selection of the Healthcare Decision Maker Relationship (ie \"Primary\"). [] Primary agent named in scanned advance directive. [x] Legal Next of Kin. [] Unable to determine legal decision maker at this time. Outreaches:       [x] 1st -  Date:  8/8/2023               Intervention:  [x] Spoke with Patient   [] Left Voice mail [] Email / Mail    [] iSpye  [] Other [phone]) : Outcomes: This Coordinator spoke with the patient offering an ACP conversation, patient agreed scheduling an appointment for August 15, 2023, at 1:00 PM. The patient has the ACP team's contact information. The HCDM is spouse following NOK.  An e-mail of ACP packet will be mailed to the listed e-mail

## 2023-08-15 ENCOUNTER — CLINICAL DOCUMENTATION (OUTPATIENT)
Dept: SPIRITUAL SERVICES | Age: 83
End: 2023-08-15

## 2023-08-15 NOTE — ACP (ADVANCE CARE PLANNING)
Advance Care Planning     Advance Care Planning Clinical Specialist  Conversation Note      Date of ACP Conversation: 8/15/2023    Conversation Conducted with: Patient with Decision Making Capacity    ACP Clinical Specialist: BENNIE Colindres  Healthcare Decision Maker:     Current Designated Healthcare Decision Maker:     Primary Decision Maker: Camilo Escalona - Spouse - [phone]  Click here to complete Healthcare Decision Makers including section of the Healthcare Decision Maker Relationship (ie \"Primary\")  Today we had the acp conversation & reviewed HCDM. Care Preferences    Hospitalization: \"If your health worsens and it becomes clear that your chance of recovery is unlikely, what would your preference be regarding hospitalization? \"    Choice:  [] The patient wants hospitalization. [x] The patient prefers comfort-focused treatment without hospitalization. Ventilation: \"If you were in your present state of health and suddenly became very ill and were unable to breathe on your own, what would your preference be about the use of a ventilator (breathing machine) if it were available to you? \"      If the patient would desire the use of ventilator (breathing machine), answer \"yes\". If not, \"no\": yes    \"If your health worsens and it becomes clear that your chance of recovery is unlikely, what would your preference be about the use of a ventilator (breathing machine) if it were available to you? \"     Would the patient desire the use of ventilator (breathing machine)?: No      Resuscitation  \"CPR works best to restart the heart when there is a sudden event, like a heart attack, in someone who is otherwise healthy. Unfortunately, CPR does not typically restart the heart for people who have serious health conditions or who are very sick. \"    \"In the event your heart stopped as a result of an underlying serious health condition, would you want attempts to be made to restart your heart (answer \"yes\" for

## 2023-08-22 NOTE — PROGRESS NOTES
[facility]   Electrophysiology Follow up   Date: 8/23/2023  I had the privilege of visiting Pedro Cummings in the office. CC: VT    HPI: Pedro Cummings is a 81 y.o. male  history of ventricular tachycardia s/p AICD implantation. He initially presented to the hospital in September 2014 with several episodes of pre-syncopal episodes while driving. He underwent a cardiac catheterization which showed non-obstructive CAD and echo revealed moderate LVH and diastolic dysfunction. Later he had documented symptomatic ventricular tachycardia. 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 He also is treated with Sotalol. He was recently hospitalized 7/2020 for pancytopenia secondary to hydroxyurea. He is following with hematology. Denies having any chest pain or shortness of breath. S/p ICD gen change 11/4/2020    Stephon Radha presents today in annual follow up . Patient denies lightheadedness, dizziness, chest pain, palpitations, orthopnea, edema, presyncope or syncope. He is active walking the dog and mowing the grass. They travel frequently to see family.      Assessment and plan:   - Ventricular tachycardia 10/2012    Continue sotalol 80    ~    Continue lopressor    S/p dual chamber  ICD 2012, generator change 11/2020      ~Interrogation today shows:8.2 years remaining, AP 16.3% ,  < 0.1%,  0% AT/AF burden per device interrogation today,  presenting ASVS @ 65 bpm . No episodes       - Coronary Artery disease   Non obstructive per cath 09/2012    Continue ASA, bb, statin     - HTN  -Controlled   -BP goal <130/80  -Home BP monitoring encouraged, printed information provided on how to accurately measure BP at home.  -Counseled to follow a low salt diet to assure blood pressure remains controlled for cardiovascular risk factor modification.   -The patient is counseled to get regular exercise 3-5 times per week and maintain a healthy weight reduce cardiovascular

## 2023-08-23 ENCOUNTER — OFFICE VISIT (OUTPATIENT)
Dept: CARDIOLOGY CLINIC | Age: 83
End: 2023-08-23
Payer: MEDICARE

## 2023-08-23 ENCOUNTER — NURSE ONLY (OUTPATIENT)
Dept: CARDIOLOGY CLINIC | Age: 83
End: 2023-08-23

## 2023-08-23 VITALS
DIASTOLIC BLOOD PRESSURE: 60 MMHG | SYSTOLIC BLOOD PRESSURE: 114 MMHG | OXYGEN SATURATION: 96 % | HEART RATE: 69 BPM | BODY MASS INDEX: 23.04 KG/M2 | WEIGHT: 152 LBS | HEIGHT: 68 IN

## 2023-08-23 DIAGNOSIS — I48.91 ATRIAL FIBRILLATION, UNSPECIFIED TYPE (HCC): ICD-10-CM

## 2023-08-23 DIAGNOSIS — I25.10 CORONARY ARTERY DISEASE INVOLVING NATIVE CORONARY ARTERY OF NATIVE HEART WITHOUT ANGINA PECTORIS: Primary | ICD-10-CM

## 2023-08-23 DIAGNOSIS — I47.20 VT (VENTRICULAR TACHYCARDIA) (HCC): ICD-10-CM

## 2023-08-23 DIAGNOSIS — Z95.810 AUTOMATIC IMPLANTABLE CARDIOVERTER-DEFIBRILLATOR IN SITU: Primary | Chronic | ICD-10-CM

## 2023-08-23 DIAGNOSIS — I10 HTN (HYPERTENSION), BENIGN: Chronic | ICD-10-CM

## 2023-08-23 PROCEDURE — 99214 OFFICE O/P EST MOD 30 MIN: CPT | Performed by: INTERNAL MEDICINE

## 2023-08-23 PROCEDURE — 3078F DIAST BP <80 MM HG: CPT | Performed by: INTERNAL MEDICINE

## 2023-08-23 PROCEDURE — 3074F SYST BP LT 130 MM HG: CPT | Performed by: INTERNAL MEDICINE

## 2023-08-23 PROCEDURE — 93000 ELECTROCARDIOGRAM COMPLETE: CPT | Performed by: INTERNAL MEDICINE

## 2023-08-23 PROCEDURE — 1123F ACP DISCUSS/DSCN MKR DOCD: CPT | Performed by: INTERNAL MEDICINE

## 2023-08-23 NOTE — PROGRESS NOTES
Medtronic FCA for Increased Potential for Reduced Energy or No Energy Delivered During High Voltage Therapy When Programmed AX > B    8/23/2023-Changed All pathways per Medtronic FCA all to B >AX

## 2023-08-29 RX ORDER — TAMSULOSIN HYDROCHLORIDE 0.4 MG/1
CAPSULE ORAL
Qty: 30 CAPSULE | Refills: 5 | Status: SHIPPED | OUTPATIENT
Start: 2023-08-29

## 2023-09-14 PROCEDURE — 93296 REM INTERROG EVL PM/IDS: CPT | Performed by: INTERNAL MEDICINE

## 2023-09-14 PROCEDURE — 93295 DEV INTERROG REMOTE 1/2/MLT: CPT | Performed by: INTERNAL MEDICINE

## 2023-10-09 RX ORDER — ROSUVASTATIN CALCIUM 20 MG/1
20 TABLET, COATED ORAL DAILY
Qty: 90 TABLET | Refills: 1 | Status: SHIPPED | OUTPATIENT
Start: 2023-10-09

## 2023-10-09 RX ORDER — LOSARTAN POTASSIUM 100 MG/1
100 TABLET ORAL DAILY
Qty: 90 TABLET | Refills: 1 | Status: SHIPPED | OUTPATIENT
Start: 2023-10-09

## 2023-11-29 ENCOUNTER — OFFICE VISIT (OUTPATIENT)
Dept: FAMILY MEDICINE CLINIC | Age: 83
End: 2023-11-29
Payer: MEDICARE

## 2023-11-29 VITALS
HEART RATE: 63 BPM | OXYGEN SATURATION: 94 % | TEMPERATURE: 96.8 F | DIASTOLIC BLOOD PRESSURE: 70 MMHG | BODY MASS INDEX: 24.04 KG/M2 | SYSTOLIC BLOOD PRESSURE: 130 MMHG | WEIGHT: 158.6 LBS | HEIGHT: 68 IN

## 2023-11-29 DIAGNOSIS — I10 HTN (HYPERTENSION), BENIGN: Primary | Chronic | ICD-10-CM

## 2023-11-29 DIAGNOSIS — N40.1 BENIGN PROSTATIC HYPERPLASIA WITH NOCTURIA: ICD-10-CM

## 2023-11-29 DIAGNOSIS — R73.09 BLOOD GLUCOSE ABNORMAL: ICD-10-CM

## 2023-11-29 DIAGNOSIS — R35.1 BENIGN PROSTATIC HYPERPLASIA WITH NOCTURIA: ICD-10-CM

## 2023-11-29 PROCEDURE — 3074F SYST BP LT 130 MM HG: CPT | Performed by: FAMILY MEDICINE

## 2023-11-29 PROCEDURE — 3078F DIAST BP <80 MM HG: CPT | Performed by: FAMILY MEDICINE

## 2023-11-29 PROCEDURE — 99214 OFFICE O/P EST MOD 30 MIN: CPT | Performed by: FAMILY MEDICINE

## 2023-11-29 PROCEDURE — 1123F ACP DISCUSS/DSCN MKR DOCD: CPT | Performed by: FAMILY MEDICINE

## 2023-11-29 ASSESSMENT — PATIENT HEALTH QUESTIONNAIRE - PHQ9
SUM OF ALL RESPONSES TO PHQ QUESTIONS 1-9: 0
SUM OF ALL RESPONSES TO PHQ QUESTIONS 1-9: 0
SUM OF ALL RESPONSES TO PHQ9 QUESTIONS 1 & 2: 0
SUM OF ALL RESPONSES TO PHQ QUESTIONS 1-9: 0
2. FEELING DOWN, DEPRESSED OR HOPELESS: 0
1. LITTLE INTEREST OR PLEASURE IN DOING THINGS: 0
SUM OF ALL RESPONSES TO PHQ QUESTIONS 1-9: 0

## 2023-12-04 NOTE — PROGRESS NOTES
Eyes: Conjunctivae and EOM are normal  Right Ear: External ear normal.  Left Ear: External ear normal.   Nose: Nose normal.  Mouth/Throat: Oropharynx is clear and moist.   Neck: Normal range of motion. Neck supple. Lymphatic: No cervical lymphadenopathy  Cardiovascular: Normal rate, regular rhythm, normal heart sounds and intact distal pulses. Pulmonary/Chest: Effort normal and breath sounds normal, no wheezes or rhonchi. Neurological:Patient is alert, oriented to person, place, and time. No obvious focal neurological deficits  Skin: Skin is warm and moist.  Psychiatric:Patient has a normal mood and affect, behavior is normal. Judgment and thought content normal.    ASSESSMENT AND PLAN    Elin Quesada was seen today for 3 month follow-up. Diagnoses and all orders for this visit:    HTN (hypertension), benign  -     CBC with Auto Differential; Future  -     Comprehensive Metabolic Panel; Future  -     Lipid Panel; Future  -     TSH with Reflex to FT4; Future    Blood glucose abnormal  -     Hemoglobin A1C; Future    Benign prostatic hyperplasia with nocturia         Return in about 3 months (around 2/29/2024), or if symptoms worsen or fail to improve. DISCHARGE MEDICATION LIST        Medication List            Accurate as of November 29, 2023 11:59 PM. If you have any questions, ask your nurse or doctor.                 CONTINUE taking these medications      amLODIPine 10 MG tablet  Commonly known as: NORVASC  Take 1 tablet by mouth daily TAKE 1 TABLET DAILY     aspirin 81 MG EC tablet     hydroxyurea 500 MG chemo capsule  Commonly known as: HYDREA     losartan 100 MG tablet  Commonly known as: COZAAR  TAKE 1 TABLET DAILY     metoprolol tartrate 25 MG tablet  Commonly known as: LOPRESSOR  TAKE 1/2 TABLET TWICE A DAY     MULTIVITAMIN ADULT PO     rosuvastatin 20 MG tablet  Commonly known as: CRESTOR  TAKE 1 TABLET DAILY     sotalol 80 MG tablet  Commonly known as: BETAPACE  TAKE 1 TABLET TWICE A DAY

## 2023-12-06 RX ORDER — TAMSULOSIN HYDROCHLORIDE 0.4 MG/1
0.4 CAPSULE ORAL DAILY
Qty: 90 CAPSULE | Refills: 1 | Status: SHIPPED | OUTPATIENT
Start: 2023-12-06

## 2023-12-14 PROCEDURE — 93296 REM INTERROG EVL PM/IDS: CPT | Performed by: INTERNAL MEDICINE

## 2023-12-14 PROCEDURE — 93295 DEV INTERROG REMOTE 1/2/MLT: CPT | Performed by: INTERNAL MEDICINE

## 2024-02-28 ENCOUNTER — OFFICE VISIT (OUTPATIENT)
Dept: FAMILY MEDICINE CLINIC | Age: 84
End: 2024-02-28
Payer: MEDICARE

## 2024-02-28 VITALS
TEMPERATURE: 97 F | SYSTOLIC BLOOD PRESSURE: 128 MMHG | HEART RATE: 62 BPM | BODY MASS INDEX: 24.01 KG/M2 | WEIGHT: 158.4 LBS | HEIGHT: 68 IN | DIASTOLIC BLOOD PRESSURE: 72 MMHG | OXYGEN SATURATION: 94 %

## 2024-02-28 DIAGNOSIS — N40.1 BENIGN PROSTATIC HYPERPLASIA WITH NOCTURIA: ICD-10-CM

## 2024-02-28 DIAGNOSIS — I48.91 ATRIAL FIBRILLATION, UNSPECIFIED TYPE (HCC): ICD-10-CM

## 2024-02-28 DIAGNOSIS — I10 HTN (HYPERTENSION), BENIGN: Primary | ICD-10-CM

## 2024-02-28 DIAGNOSIS — E78.2 MIXED HYPERLIPIDEMIA: ICD-10-CM

## 2024-02-28 DIAGNOSIS — D47.3 ESSENTIAL THROMBOCYTOSIS (HCC): ICD-10-CM

## 2024-02-28 DIAGNOSIS — R35.1 BENIGN PROSTATIC HYPERPLASIA WITH NOCTURIA: ICD-10-CM

## 2024-02-28 PROCEDURE — 1123F ACP DISCUSS/DSCN MKR DOCD: CPT | Performed by: FAMILY MEDICINE

## 2024-02-28 PROCEDURE — 3074F SYST BP LT 130 MM HG: CPT | Performed by: FAMILY MEDICINE

## 2024-02-28 PROCEDURE — 99214 OFFICE O/P EST MOD 30 MIN: CPT | Performed by: FAMILY MEDICINE

## 2024-02-28 PROCEDURE — 3078F DIAST BP <80 MM HG: CPT | Performed by: FAMILY MEDICINE

## 2024-02-28 RX ORDER — TAMSULOSIN HYDROCHLORIDE 0.4 MG/1
0.4 CAPSULE ORAL DAILY
Qty: 90 CAPSULE | Refills: 1 | Status: SHIPPED | OUTPATIENT
Start: 2024-02-28

## 2024-02-28 RX ORDER — ROSUVASTATIN CALCIUM 20 MG/1
20 TABLET, COATED ORAL DAILY
Qty: 90 TABLET | Refills: 1 | Status: SHIPPED | OUTPATIENT
Start: 2024-02-28

## 2024-02-28 ASSESSMENT — PATIENT HEALTH QUESTIONNAIRE - PHQ9
2. FEELING DOWN, DEPRESSED OR HOPELESS: 0
SUM OF ALL RESPONSES TO PHQ QUESTIONS 1-9: 0
1. LITTLE INTEREST OR PLEASURE IN DOING THINGS: 0
SUM OF ALL RESPONSES TO PHQ QUESTIONS 1-9: 0
SUM OF ALL RESPONSES TO PHQ9 QUESTIONS 1 & 2: 0
SUM OF ALL RESPONSES TO PHQ QUESTIONS 1-9: 0
SUM OF ALL RESPONSES TO PHQ QUESTIONS 1-9: 0

## 2024-02-29 ENCOUNTER — TELEPHONE (OUTPATIENT)
Dept: FAMILY MEDICINE CLINIC | Age: 84
End: 2024-02-29

## 2024-02-29 RX ORDER — AMLODIPINE BESYLATE 10 MG/1
10 TABLET ORAL DAILY
Qty: 90 TABLET | Refills: 1 | Status: SHIPPED | OUTPATIENT
Start: 2024-02-29

## 2024-02-29 RX ORDER — TAMSULOSIN HYDROCHLORIDE 0.4 MG/1
CAPSULE ORAL DAILY
Qty: 90 CAPSULE | Refills: 1 | OUTPATIENT
Start: 2024-02-29

## 2024-02-29 NOTE — PROGRESS NOTES
Portions of this chart may have been created with voice recognition software. Occasional wrong-word or \"sound-alike\" substitutions may have occurred due to the inherent limitations of voice recognition software. Read the chart carefully and recognize, using context, where these substitutions have occurred        Chief Complaint     3 Month Follow-Up               SUBJECTIVE    Lowell Jimenez is a 84 y.o. male here for 3 Month Follow-Up           1. HTN (hypertension), benign      Hypertension ROS: Blood pressures are well-controlled in spite of not taking the amlodipine for the last 3 to 4 days as he ran out of the prescription.  Taking medications as instructed, no medication side effects noted, no TIA's, no chest pain on exertion, no dyspnea on exertion, no swelling of ankles, positive for occasional orthostatic dizziness/ lightheadedness-recommended to continue to be off of amlodipine to avoid hypotensive episodes, patient declined and states that he wants to continue taking the amlodipine., no orthopnea or paroxysmal nocturnal dyspnea, no palpitations, no intermittent claudication symptoms.  New concerns: none.  Plan: current treatment plan is effective, no change in therapy  lab results and schedule of future lab studies reviewed with patient       - amLODIPine (NORVASC) 10 MG tablet; Take 1 tablet by mouth daily TAKE 1 TABLET DAILY  Dispense: 90 tablet; Refill: 1    2. Mixed hyperlipidemia  LDL goal is under 100   ROS: taking medications as instructed, no medication side effects noted, no TIA's, no chest pain on exertion, no dyspnea on exertion, no swelling of ankles.  New concerns: none.  Plan: current treatment plan is effective, no change in therapy  lab results and schedule of future lab studies reviewed with patient     3. Atrial fibrillation, unspecified type (HCC)  On beta-blockers, no chest pain, palpitations, shortness of breath lightheadedness dizziness at this time.  Following up regularly with

## 2024-03-04 NOTE — TELEPHONE ENCOUNTER
Please advise patient that all his lab results are within acceptable range with no significant abnormalities. Continue with current management. Follow up as advised.

## 2024-03-05 RX ORDER — SOTALOL HYDROCHLORIDE 80 MG/1
80 TABLET ORAL DAILY
Qty: 180 TABLET | Refills: 0 | Status: SHIPPED | OUTPATIENT
Start: 2024-03-05

## 2024-03-05 NOTE — TELEPHONE ENCOUNTER
Last ov:23 RMM  Next ov:  Last EK23  Last labs:23  Last filled:   Disp Refills Start End    sotalol (BETAPACE) 80 MG tablet 180 tablet 3 2023 --    Sig: TAKE 1 TABLET TWICE A DAY    Sent to pharmacy as: Sotalol HCl 80 MG Oral Tablet (BETAPACE)    Cosign for Ordering: Accepted by Desmond Villa APRN - CNP on 2023 10:23 AM    E-Prescribing Status: Receipt confirmed by pharmacy (2023 10:14 AM EST)       Disp Refills Start End    metoprolol tartrate (LOPRESSOR) 25 MG tablet 90 tablet 3 2023 --    Sig: TAKE 1/2 TABLET TWICE A DAY    Sent to pharmacy as: Metoprolol Tartrate 25 MG Oral Tablet (LOPRESSOR)    Cosign for Ordering: Accepted by Desmond Villa APRN - CNP on 2023 10:23 AM    E-Prescribing Status: Receipt confirmed by pharmacy (2023 10:14 AM EST)

## 2024-03-22 ENCOUNTER — TELEPHONE (OUTPATIENT)
Dept: CARDIOLOGY CLINIC | Age: 84
End: 2024-03-22

## 2024-03-22 RX ORDER — SOTALOL HYDROCHLORIDE 80 MG/1
80 TABLET ORAL 2 TIMES DAILY
Qty: 180 TABLET | Refills: 3 | Status: SHIPPED | OUTPATIENT
Start: 2024-03-22

## 2024-03-22 NOTE — TELEPHONE ENCOUNTER
Called and spoke to Pt about sotalol, Pt stated he had been taking BID for last 8 years. I do not see a note of change to once daily. Appt with NPAL 07/22 stated BID, [facility] filled script for BID 02/08/23. RMM OV 08/23 states once daily.    Please confirm which frequency and if it was changed why it was decreased.

## 2024-05-08 RX ORDER — LOSARTAN POTASSIUM 100 MG/1
100 TABLET ORAL DAILY
Qty: 90 TABLET | Refills: 1 | Status: SHIPPED | OUTPATIENT
Start: 2024-05-08

## 2024-05-17 ENCOUNTER — OFFICE VISIT (OUTPATIENT)
Dept: FAMILY MEDICINE CLINIC | Age: 84
End: 2024-05-17
Payer: MEDICARE

## 2024-05-17 VITALS
SYSTOLIC BLOOD PRESSURE: 112 MMHG | WEIGHT: 157.6 LBS | BODY MASS INDEX: 23.89 KG/M2 | DIASTOLIC BLOOD PRESSURE: 64 MMHG | OXYGEN SATURATION: 94 % | TEMPERATURE: 97.3 F | HEIGHT: 68 IN | HEART RATE: 62 BPM

## 2024-05-17 DIAGNOSIS — R53.83 OTHER FATIGUE: Primary | ICD-10-CM

## 2024-05-17 DIAGNOSIS — I10 HTN (HYPERTENSION), BENIGN: ICD-10-CM

## 2024-05-17 PROCEDURE — 3074F SYST BP LT 130 MM HG: CPT | Performed by: FAMILY MEDICINE

## 2024-05-17 PROCEDURE — 1123F ACP DISCUSS/DSCN MKR DOCD: CPT | Performed by: FAMILY MEDICINE

## 2024-05-17 PROCEDURE — 3078F DIAST BP <80 MM HG: CPT | Performed by: FAMILY MEDICINE

## 2024-05-17 PROCEDURE — 99213 OFFICE O/P EST LOW 20 MIN: CPT | Performed by: FAMILY MEDICINE

## 2024-06-21 ENCOUNTER — TELEPHONE (OUTPATIENT)
Dept: FAMILY MEDICINE CLINIC | Age: 84
End: 2024-06-21

## 2024-06-21 NOTE — TELEPHONE ENCOUNTER
LMOM to explain further the steps to follow Dr Paredes and that she is going to [facility] not [facility].

## 2024-06-21 NOTE — TELEPHONE ENCOUNTER
----- Message from Yasir Hidalgo sent at 6/20/2024  1:29 PM EDT -----  Regarding: ECC Message to Provider  ECC Message to Provider    Relationship to Patient: Spouse/Partner : richie     Additional Information : would like to inform Soha Paredes MD That they love to go to [employer] with her and would like to be contacted  --------------------------------------------------------------------------------------------------------------------------    Call Back Information: OK to leave message on voicemail  Preferred Call Back Number: Phone : [phone]

## 2024-07-15 ENCOUNTER — TELEPHONE (OUTPATIENT)
Dept: PHARMACY | Facility: CLINIC | Age: 84
End: 2024-07-15

## 2024-07-15 NOTE — TELEPHONE ENCOUNTER
[facility] CLINICAL PHARMACY: ADHERENCE REVIEW  Identified care gap per Bearden: fills at [facility]: Statin adherence    Patient also appears to be prescribed: ACE/ARB (adherent)    ASSESSMENT  STATIN ADHERENCE    Insurance Records claims through 24 (Prior Year PDC = not reported; YTD PDC = FIRST FILL; Potential Fail Date: 24):   Rosuvastatin 20mg Next refill due: 24    Prescribed si tablet/capsule daily    Per Insurer Portal: last filled on 24 for 90 day supply. [facility]    Per [facility]: will get  90 day supply ready to  since past due. Left message    RX# [id number] [facility]    Lab Results   Component Value Date    CHOL 141 2023    TRIG 370 (H) 2023    HDL 31 (L) 2023     Lab Results   Component Value Date    LDL 54 2023      ALT   Date Value Ref Range Status   2024 18 10 - 60 IU/L Final     AST   Date Value Ref Range Status   2024 14 10 - 40 IU/L Final     The ASCVD Risk score (Cielo DK, et al., 2019) failed to calculate for the following reasons:    The 2019 ASCVD risk score is only valid for ages 40 to 79     The following are interventions that have been identified:   Patient OVERDUE refilling Rosuvastatin 20mg and active on home medication list.     Left message to call back    Last Visit: 24  Next Visit: none      Ileana Michelle CPhT.   [facility]   [facility]  Toll free: [phone] Option 1     For Pharmacy Admin Tracking Only    Program: OPENLANE  CPA in place:  No  Gap Closed?: No   Time Spent (min): 15

## 2024-10-02 PROBLEM — M70.21 OLECRANON BURSITIS, RIGHT ELBOW: Status: RESOLVED | Noted: 2021-06-28 | Resolved: 2024-10-02

## 2024-10-14 PROBLEM — M77.8 TENDINITIS OF RIGHT SHOULDER: Status: RESOLVED | Noted: 2023-01-23 | Resolved: 2024-10-14

## 2024-10-14 NOTE — PROGRESS NOTES
HPI  Cardiovascular: Reviewed in HPI  Gastrointestinal: Negative for abdominal pain, N/V/D, constipation, or black/tarry stools  Genito-Urinary: Negative for dysuria, incontinence, urgency, or hematuria  Musculoskeletal: Negative for joint swelling, muscle pain, or injuries  Neurological/Psych: Negative for confusion, seizures, dizziness, headaches, balance issues or TIA-like symptoms. No anxiety, depression, or insomnia    Physical Examination:  Vitals:    10/21/24 0856   BP: (!) 140/80   Pulse: 68   SpO2: 97%      Wt Readings from Last 3 Encounters:   10/21/24 70.8 kg (156 lb)   05/17/24 71.5 kg (157 lb 9.6 oz)   02/28/24 71.8 kg (158 lb 6.4 oz)     Constitutional: Cooperative and in no apparent distress, and appears well nourished  Skin: Warm and pink; no pallor, cyanosis, bruising, or clubbing  HEENT: Symmetric and normocephalic. PERRL, EOM intact. Conjunctiva pink with clear sclera. Mucus membranes pink and moist. Teeth intact. Thyroid smooth without nodules or goiter  Respiratory: Respirations symmetric and unlabored. Lungs clear to auscultation bilaterally, no wheezing, rhonchi, or crackles  Cardiovascular:  Regular rate and rhythm. S1/S2 present without murmurs, rubs, or gallops. Peripheral pulses 2+, capillary refill < 3 seconds. No elevation of JVP. No peripheral edema  Gastrointestinal: Abdomen soft and round. Bowel sounds normoactive in all quadrants without tenderness or masses.  Musculoskeletal: Bilateral upper and lower extremity strength 5/5 with full ROM.  Neurological/Psych: Awake and orientated to person, place and time. Calm affect, appropriate mood.     Pertinent labs, diagnostic, device, and imaging results reviewed as a part of this visit    LABS    CBC:   Lab Results   Component Value Date    WBC 6.3 10/04/2024    HGB 14.1 10/04/2024    HCT 40.8 10/04/2024    .9 (H) 10/04/2024     10/04/2024     BMP:   Lab Results   Component Value Date    CREATININE 0.89 10/04/2024    BUN 23

## 2024-10-21 ENCOUNTER — OFFICE VISIT (OUTPATIENT)
Dept: CARDIOLOGY CLINIC | Age: 84
End: 2024-10-21
Payer: MEDICARE

## 2024-10-21 ENCOUNTER — NURSE ONLY (OUTPATIENT)
Dept: CARDIOLOGY CLINIC | Age: 84
End: 2024-10-21

## 2024-10-21 VITALS
SYSTOLIC BLOOD PRESSURE: 140 MMHG | WEIGHT: 156 LBS | HEART RATE: 68 BPM | OXYGEN SATURATION: 97 % | DIASTOLIC BLOOD PRESSURE: 80 MMHG | BODY MASS INDEX: 23.64 KG/M2 | HEIGHT: 68 IN

## 2024-10-21 DIAGNOSIS — Z95.810 AUTOMATIC IMPLANTABLE CARDIOVERTER-DEFIBRILLATOR IN SITU: Chronic | ICD-10-CM

## 2024-10-21 DIAGNOSIS — I10 HTN (HYPERTENSION), BENIGN: Chronic | ICD-10-CM

## 2024-10-21 DIAGNOSIS — I25.10 CORONARY ARTERY DISEASE INVOLVING NATIVE CORONARY ARTERY OF NATIVE HEART WITHOUT ANGINA PECTORIS: ICD-10-CM

## 2024-10-21 DIAGNOSIS — I47.20 VT (VENTRICULAR TACHYCARDIA) (HCC): ICD-10-CM

## 2024-10-21 DIAGNOSIS — I47.20 VT (VENTRICULAR TACHYCARDIA) (HCC): Primary | ICD-10-CM

## 2024-10-21 DIAGNOSIS — Z95.810 AUTOMATIC IMPLANTABLE CARDIOVERTER-DEFIBRILLATOR IN SITU: Primary | Chronic | ICD-10-CM

## 2024-10-21 DIAGNOSIS — I48.91 ATRIAL FIBRILLATION, UNSPECIFIED TYPE (HCC): ICD-10-CM

## 2024-10-21 PROCEDURE — 3079F DIAST BP 80-89 MM HG: CPT | Performed by: NURSE PRACTITIONER

## 2024-10-21 PROCEDURE — 3077F SYST BP >= 140 MM HG: CPT | Performed by: NURSE PRACTITIONER

## 2024-10-21 PROCEDURE — 93000 ELECTROCARDIOGRAM COMPLETE: CPT | Performed by: NURSE PRACTITIONER

## 2024-10-21 PROCEDURE — 99214 OFFICE O/P EST MOD 30 MIN: CPT | Performed by: NURSE PRACTITIONER

## 2024-10-21 PROCEDURE — 1123F ACP DISCUSS/DSCN MKR DOCD: CPT | Performed by: NURSE PRACTITIONER

## 2025-02-17 RX ORDER — METOPROLOL TARTRATE 25 MG/1
25 TABLET, FILM COATED ORAL 2 TIMES DAILY
Qty: 180 TABLET | Refills: 1 | Status: SHIPPED | OUTPATIENT
Start: 2025-02-17

## 2025-02-17 NOTE — TELEPHONE ENCOUNTER
Received refill request for metoprolol tartrate (LOPRESSOR) 25 MG  from  [facility]  pharmacy.     Last OV: 10/21/24    Next OV: 10/23/25    Last Labs:     Last Filled: 3/5/24

## 2025-05-19 RX ORDER — SOTALOL HYDROCHLORIDE 80 MG/1
80 TABLET ORAL 2 TIMES DAILY
Qty: 180 TABLET | Refills: 3 | Status: SHIPPED | OUTPATIENT
Start: 2025-05-19

## 2025-05-19 NOTE — TELEPHONE ENCOUNTER
Received refill request for SOTALOL HCL 80MG TABS  from [facility] pharmacy.     Last OV: 10/21/24    Next OV: 10/23/25    Last Labs: EKG 10/21/24    Last Filled: 3/22/24

## 2025-06-19 ENCOUNTER — HOSPITAL ENCOUNTER (OUTPATIENT)
Dept: GENERAL RADIOLOGY | Age: 85
Discharge: HOME | End: 2025-06-19
Payer: MEDICARE

## 2025-06-19 ENCOUNTER — HOSPITAL ENCOUNTER (OUTPATIENT)
Age: 85
Discharge: HOME | End: 2025-06-19
Payer: MEDICARE

## 2025-06-19 DIAGNOSIS — M16.11 ARTHRITIS OF RIGHT HIP: ICD-10-CM

## 2025-06-19 PROCEDURE — 73502 X-RAY EXAM HIP UNI 2-3 VIEWS: CPT

## 2025-06-23 ENCOUNTER — HOSPITAL ENCOUNTER (OUTPATIENT)
Dept: PHYSICAL THERAPY | Age: 85
Setting detail: THERAPIES SERIES
Discharge: HOME | End: 2025-06-23
Payer: MEDICARE

## 2025-06-23 DIAGNOSIS — R29.898 IMPAIRED STRENGTH OF HIP MUSCLES: ICD-10-CM

## 2025-06-23 DIAGNOSIS — M16.11 PRIMARY OSTEOARTHRITIS OF RIGHT HIP: Primary | ICD-10-CM

## 2025-06-23 DIAGNOSIS — Z74.09 DECREASED FUNCTIONAL MOBILITY AND ENDURANCE: ICD-10-CM

## 2025-06-23 DIAGNOSIS — M25.652 DECREASED RANGE OF MOTION OF BOTH HIPS: ICD-10-CM

## 2025-06-23 DIAGNOSIS — M25.651 DECREASED RANGE OF MOTION OF BOTH HIPS: ICD-10-CM

## 2025-06-23 PROCEDURE — 97110 THERAPEUTIC EXERCISES: CPT

## 2025-06-23 PROCEDURE — 97161 PT EVAL LOW COMPLEX 20 MIN: CPT

## 2025-06-23 NOTE — PLAN OF CARE
benefit from continued outpatient therapy services to address the deficits outlined in the patients goals  The patient has a musculoskeletal condition(s) with a corresponding ICD-10 code that is of complexity and severity that require skilled therapeutic intervention. This has a direct and significant impact on the need for therapy and significantly impacts the rate of recovery.     Return to Play: NA    Prognosis for POC: [x] Good [] Fair  [] Poor    Patient requires continued skilled intervention: [x] Yes  [] No      CHARGE CAPTURE     PT CHARGE GRID   CPT Code (TIMED) minutes # CPT Code (UNTIMED) #     Therex (93012)  25 2  EVAL:LOW (70952 - Typically 20 minutes face-to-face) 1    Neuromusc. Re-ed (08352)    Re-Eval (37646)     Manual (91116)    Estim Unattended (36182)     Ther. Act (52724)    Mech. Traction (95710)     Gait (42162)    Dry Needle 1-2 muscle (14519)     Aquatic Therex (19411)    Dry Needle 3+ muscle (64086)     Iontophoresis (70848)    VASO (16996)     Ultrasound (06940)    Group Therapy (75310)     Estim Attended (07388)    Canalith Repositioning (71484)     Physical Performance Test (46094)    Custom orthotic ()     Other:    Other:    Total Timed Code Tx Minutes 25 2  1     Total Treatment Minutes 45        Charge Justification:  (44342) THERAPEUTIC EXERCISE - Provided verbal/tactile cueing for HEP and/or activities related to strengthening, flexibility, endurance, ROM performed to prevent loss of range of motion, maintain or improve muscular strength or increase flexibility, following either an injury or surgery.     GOALS     Patient stated goal: decrease R hip pain  [] Progressing: [] Met: [] Not Met: [] Adjusted    Therapist goals for Patient:   Short Term Goals: To be achieved in: 2 weeks  Independent in HEP and progression per patient tolerance, in order to prevent re-injury.   [] Progressing: [] Met: [] Not Met: [] Adjusted  Patient will have a decrease in pain to <2/10 to

## 2025-06-30 ENCOUNTER — HOSPITAL ENCOUNTER (OUTPATIENT)
Dept: PHYSICAL THERAPY | Age: 85
Setting detail: THERAPIES SERIES
Discharge: HOME | End: 2025-06-30
Payer: MEDICARE

## 2025-06-30 PROCEDURE — 97110 THERAPEUTIC EXERCISES: CPT

## 2025-06-30 NOTE — PLAN OF CARE
progression of HEP    Plan: Cont POC- Continue emphasis/focus on exercise progression, improving proper muscle recruitment and activation/motor control patterns, static and dynamic balance, kinesthetic sense and proprioception, and improving postural awareness. Next visit plan to progress reps, add new exercises, and progress balance     Electronically Signed by RENETTA Alonso , DPT, CNS     Date: 06/30/2025     RENETTA Alonso    Therapist was present, directed the patient's care, made skilled judgement, and was responsible for assessment and treatment of the patient.    Note: Portions of this note have been templated and/or copied from initial evaluation, reassessments and prior notes for documentation efficiency.  Note: If patient does not return for scheduled/recommended follow up visits, this note will serve as a discharge from care along with the most recent update on progress.    Ortho Evaluation

## 2025-06-30 NOTE — FLOWSHEET NOTE
[facility] - Outpatient Rehabilitation and Therapy: [address], [place] [address] office: [phone] fax: [phone]       Physical Therapy: TREATMENT/PROGRESS NOTE   Patient: Lowell Jimenez (85 y.o. male)   Examination Date: 2025   :  1940 MRN: [id number]   Visit #:  approved   Insurance Allowable Auth Needed   Auth Arianlomaru, 6 approved, 25-25 [x]Yes    []No    Insurance: Payor: [facility] MEDICARE / Plan: [facility] [employer]EEPERS MEDIBLUE PLUS / Product Type: *No Product type* /   Insurance ID: [id number] - (Medicare Managed)  Secondary Insurance (if applicable):    Treatment Diagnosis:     ICD-10-CM    1. Primary osteoarthritis of right hip  M16.11       2. Decreased range of motion of both hips  M25.651     M25.652       3. Impaired strength of hip muscles  R29.898       4. Decreased functional mobility and endurance  Z74.09          Medical Diagnosis:  Unilateral primary osteoarthritis, right hip [M16.11]   Referring Physician: Soha Paredes MD  PCP: Soha Paredes MD     Plan of care signed (Y/N): No, sent    Date of Patient follow up with Physician: ??     Plan of Care Report: NO    POC update due: (10 visits /OR AUTH LIMITS, whichever is less) visit 6 or  2025                                             Medical History:  Comorbidities:  Hypertension  Pacemaker  Osteoarthritis  Other Neurological Conditions: Dementia  Other Cardiovascular Conditions: Afib, stenosis of the bilateral internal carotid arteries  Other: Essential thrombocytosis (myeloproliferative disorder)  Relevant Medical History: PT in 2017 for lumbar stenosis                                         Precautions/ Contra-indications:           Latex allergy:  NO  Pacemaker:    YES  Contraindications for Manipulation: NA  Date of Surgery: none  Other: Macular degeneration    Red Flags:  None    Suicide Screening:   The patient did not verbalize a primary behavioral concern,

## 2025-07-02 ENCOUNTER — HOSPITAL ENCOUNTER (OUTPATIENT)
Dept: PHYSICAL THERAPY | Age: 85
Setting detail: THERAPIES SERIES
Discharge: HOME | End: 2025-07-02
Payer: MEDICARE

## 2025-07-02 PROCEDURE — 97112 NEUROMUSCULAR REEDUCATION: CPT

## 2025-07-02 PROCEDURE — 97110 THERAPEUTIC EXERCISES: CPT

## 2025-07-02 PROCEDURE — 97530 THERAPEUTIC ACTIVITIES: CPT

## 2025-07-02 NOTE — FLOWSHEET NOTE
[facility] - Outpatient Rehabilitation and Therapy: [address], [place] [address] office: [phone] fax: [phone]       Physical Therapy: TREATMENT/PROGRESS NOTE   Patient: Lowell Jimenez (85 y.o. male)   Examination Date: 2025   :  1940 MRN: [id number]   Visit #: 3/6 approved   Insurance Allowable Auth Needed   Auth Arianlomaru, 6 approved, 25-25 [x]Yes    []No    Insurance: Payor: [facility] MEDICARE / Plan: [facility] HEALTHKEEPERS MEDIBLUE PLUS / Product Type: *No Product type* /   Insurance ID: [id number] - (Medicare Managed)  Secondary Insurance (if applicable):    Treatment Diagnosis:     ICD-10-CM    1. Primary osteoarthritis of right hip  M16.11       2. Decreased range of motion of both hips  M25.651     M25.652       3. Impaired strength of hip muscles  R29.898       4. Decreased functional mobility and endurance  Z74.09          Medical Diagnosis:  Unilateral primary osteoarthritis, right hip [M16.11]   Referring Physician: Soha Paredes MD  PCP: Soha Paredes MD     Plan of care signed (Y/N): No, sent    Date of Patient follow up with Physician: ??     Plan of Care Report: NO    POC update due: (10 visits /OR AUTH LIMITS, whichever is less) visit 6 or 2025                                             Medical History:  Comorbidities:  Hypertension  Pacemaker  Osteoarthritis  Other Neurological Conditions: Dementia  Other Cardiovascular Conditions: Afib, stenosis of the bilateral internal carotid arteries  Other: Essential thrombocytosis (myeloproliferative disorder)  Relevant Medical History: PT in 2017 for lumbar stenosis                                         Precautions/ Contra-indications:           Latex allergy:  NO  Pacemaker:    YES  Contraindications for Manipulation: NA  Date of Surgery: none  Other: Macular degeneration    Red Flags:  None    Suicide Screening:   The patient did not verbalize a primary behavioral concern,

## 2025-07-07 ENCOUNTER — HOSPITAL ENCOUNTER (OUTPATIENT)
Dept: PHYSICAL THERAPY | Age: 85
Setting detail: THERAPIES SERIES
Discharge: HOME | End: 2025-07-07
Payer: MEDICARE

## 2025-07-07 PROCEDURE — 97110 THERAPEUTIC EXERCISES: CPT

## 2025-07-07 PROCEDURE — 97530 THERAPEUTIC ACTIVITIES: CPT

## 2025-07-07 NOTE — FLOWSHEET NOTE
following either an injury or surgery.   (88791) THERAPEUTIC ACTIVITY - use of dynamic activities to improve functional performance. (Ex include squatting, ascending/descending stairs, walking, bending, lifting, catching, throwing, pushing, pulling, jumping.)  Direct, one on one contact, billed in 15-minute increments.    GOALS     Patient stated goal: decrease R hip pain  [] Progressing: [] Met: [] Not Met: [] Adjusted    Therapist goals for Patient:   Short Term Goals: To be achieved in: 2 weeks  Independent in HEP and progression per patient tolerance, in order to prevent re-injury.   [] Progressing: [] Met: [] Not Met: [] Adjusted  Patient will have a decrease in pain to <2/10 to facilitate improvement in movement, function, and ADLs as indicated by Functional Deficits.  [] Progressing: [] Met: [] Not Met: [] Adjusted      Long Term Goals: To be achieved in: 4 weeks  Disability index score of 8% or less for the WOMAC to assist with reaching prior level of function with physical activities such as yard work and heavy household duties. donning and doffing socks.  [] Progressing: [] Met: [] Not Met: [] Adjusted  Patient will demonstrate increased Strength of Hip extensors and abductors to at least 4+/5 throughout without pain to allow for proper functional mobility to enable patient to return to ADLs and IADLs with no increases in pain or restrictions.   [] Progressing: [] Met: [] Not Met: [] Adjusted  Patient will return to mowing his lawn for 30 minutes without increased symptoms or restriction.   [] Progressing: [] Met: [] Not Met: [] Adjusted  Patient will improve R hamstring length to lack 20 deg B in order to aid in hip range of motion so that the patient can bend and pick something up from the floor without pain.   [] Progressing: [] Met: [] Not Met: [] Adjusted   Patient will improve 30 second STS score to 12 reps to improve LE strength and endurance in order for the patient to ascend his 2 flights of

## 2025-07-10 ENCOUNTER — APPOINTMENT (OUTPATIENT)
Dept: PHYSICAL THERAPY | Age: 85
End: 2025-07-10
Payer: MEDICARE

## 2025-07-11 ENCOUNTER — HOSPITAL ENCOUNTER (OUTPATIENT)
Dept: PHYSICAL THERAPY | Age: 85
Setting detail: THERAPIES SERIES
Discharge: HOME | End: 2025-07-11
Payer: MEDICARE

## 2025-07-11 PROCEDURE — 97530 THERAPEUTIC ACTIVITIES: CPT

## 2025-07-11 PROCEDURE — 97110 THERAPEUTIC EXERCISES: CPT

## 2025-07-11 NOTE — FLOWSHEET NOTE
[facility] - Outpatient Rehabilitation and Therapy: [address], [place] [address] office: [phone] fax: [phone]       Physical Therapy: TREATMENT/PROGRESS NOTE   Patient: Lowell Jimenez (85 y.o. male)   Examination Date: 2025   :  1940 MRN: [id number]   Visit #:  approved   Insurance Allowable Auth Needed   Auth Nayan, Kee approved, 25-25 [x]Yes    []No    Insurance: Payor: [facility] MEDICARE / Plan: [facility] HEALTHKEEPERS MEDIBLUE PLUS / Product Type: *No Product type* /   Insurance ID: [id number] - (Medicare Managed)  Secondary Insurance (if applicable):    Treatment Diagnosis:     ICD-10-CM    1. Primary osteoarthritis of right hip  M16.11       2. Decreased range of motion of both hips  M25.651     M25.652       3. Impaired strength of hip muscles  R29.898       4. Decreased functional mobility and endurance  Z74.09          Medical Diagnosis:  Unilateral primary osteoarthritis, right hip [M16.11]   Referring Physician: Soha Paredes MD  PCP: Soha Paredes MD     Plan of care signed (Y/N): yes,     Date of Patient follow up with Physician: ??     Plan of Care Report: NO    POC update due: (10 visits /OR AUTH LIMITS, whichever is less) visit 6 or 2025                                             Medical History:  Comorbidities:  Hypertension  Pacemaker  Osteoarthritis  Other Neurological Conditions: Dementia  Other Cardiovascular Conditions: Afib, stenosis of the bilateral internal carotid arteries  Other: Essential thrombocytosis (myeloproliferative disorder)  Relevant Medical History: PT in 2017 for lumbar stenosis                                         Precautions/ Contra-indications:           Latex allergy:  NO  Pacemaker:    YES  Contraindications for Manipulation: NA  Date of Surgery: none  Other: Macular degeneration    Red Flags:  None    Suicide Screening:   The patient did not verbalize a primary behavioral concern,

## 2025-07-14 ENCOUNTER — HOSPITAL ENCOUNTER (OUTPATIENT)
Dept: PHYSICAL THERAPY | Age: 85
Setting detail: THERAPIES SERIES
Discharge: HOME | End: 2025-07-14
Payer: MEDICARE

## 2025-07-14 PROCEDURE — 97110 THERAPEUTIC EXERCISES: CPT

## 2025-07-14 NOTE — DISCHARGE SUMMARY
[facility] - Outpatient Rehabilitation and Therapy: [address], [place] [address] office: [phone] fax: [phone]     Physical Therapy Discharge Summary    Dear Soha Paredes MD   ,    We had the pleasure of treating the following patient for physical therapy services at [facility].  A summary of our findings can be found in the discharge summary below.  If you have any questions or concerns regarding these findings, please do not hesitate to contact me at the office phone number checked above.  Thank you for the referral.     Total Visits: 6     Recommendation:   [] Hold PT, pending MD visit   [x] Discharge to HEP. Follow up with MD PRN.     Reason for Discharge:  Patient should continue to improve in reasonable time if they continue HEP.  Compared to at IE the patient has made improvements in general LE strength, improved 30\" STS from 10 reps to 11.5 reps, HS length improved bilaterally, TUG time decreased about 1 second, WOMAC improved from 15% to 11% and decreased R hip pain.     Physical Therapy: TREATMENT/PROGRESS NOTE   Patient: Lowell Jimenez (85 y.o. male)   Examination Date: 2025   :  1940 MRN: [id number]   Visit #:  approved   Insurance Allowable Auth Needed   Auth Nayan, Kee approved, 25-25 [x]Yes    []No    Insurance: Payor: [facility] MEDICARE / Plan: [facility] HEALTHKEEPERS MEDIBLUE PLUS / Product Type: *No Product type* /   Insurance ID: [id number] - (Medicare Managed)  Secondary Insurance (if applicable):    Treatment Diagnosis:     ICD-10-CM    1. Primary osteoarthritis of right hip  M16.11       2. Decreased range of motion of both hips  M25.651     M25.652       3. Impaired strength of hip muscles  R29.898       4. Decreased functional mobility and endurance  Z74.09          Medical Diagnosis:  Unilateral primary osteoarthritis, right hip [M16.11]   Referring Physician: Soha Paredes MD  PCP: Lena

## 2025-07-17 ENCOUNTER — APPOINTMENT (OUTPATIENT)
Dept: PHYSICAL THERAPY | Age: 85
End: 2025-07-17
Payer: MEDICARE

## 2025-07-18 ENCOUNTER — APPOINTMENT (OUTPATIENT)
Dept: PHYSICAL THERAPY | Age: 85
End: 2025-07-18
Payer: MEDICARE

## 2025-07-21 ENCOUNTER — APPOINTMENT (OUTPATIENT)
Dept: PHYSICAL THERAPY | Age: 85
End: 2025-07-21
Payer: MEDICARE

## 2025-07-28 ENCOUNTER — TELEPHONE (OUTPATIENT)
Dept: CARDIOLOGY CLINIC | Age: 85
End: 2025-07-28

## 2025-09-05 ENCOUNTER — TELEPHONE (OUTPATIENT)
Dept: CARDIOLOGY CLINIC | Age: 85
End: 2025-09-05

## 2025-09-05 RX ORDER — METOPROLOL TARTRATE 25 MG/1
25 TABLET, FILM COATED ORAL 2 TIMES DAILY
Qty: 180 TABLET | Refills: 1 | Status: SHIPPED | OUTPATIENT
Start: 2025-09-05